# Patient Record
Sex: MALE | Race: WHITE | NOT HISPANIC OR LATINO
[De-identification: names, ages, dates, MRNs, and addresses within clinical notes are randomized per-mention and may not be internally consistent; named-entity substitution may affect disease eponyms.]

---

## 2017-01-30 ENCOUNTER — APPOINTMENT (OUTPATIENT)
Dept: RHEUMATOLOGY | Facility: CLINIC | Age: 77
End: 2017-01-30

## 2017-01-30 VITALS
TEMPERATURE: 97.8 F | BODY MASS INDEX: 24.92 KG/M2 | WEIGHT: 178 LBS | SYSTOLIC BLOOD PRESSURE: 134 MMHG | HEART RATE: 60 BPM | DIASTOLIC BLOOD PRESSURE: 80 MMHG | RESPIRATION RATE: 20 BRPM | HEIGHT: 71 IN

## 2017-01-30 DIAGNOSIS — Z55.8 OTHER PROBLEMS RELATED TO EDUCATION AND LITERACY: ICD-10-CM

## 2017-01-30 DIAGNOSIS — M54.41 LUMBAGO WITH SCIATICA, RIGHT SIDE: ICD-10-CM

## 2017-01-30 DIAGNOSIS — G89.29 LUMBAGO WITH SCIATICA, RIGHT SIDE: ICD-10-CM

## 2017-01-30 SDOH — EDUCATIONAL SECURITY - EDUCATION ATTAINMENT: OTHER PROBLEMS RELATED TO EDUCATION AND LITERACY: Z55.8

## 2017-01-31 LAB
BASOPHILS # BLD AUTO: 0.02 K/UL
BASOPHILS NFR BLD AUTO: 0.3 %
BILIRUB UR QL STRIP: NORMAL
COLLECTION METHOD: NORMAL
EOSINOPHIL # BLD AUTO: 0.08 K/UL
EOSINOPHIL NFR BLD AUTO: 1.4 %
GLUCOSE UR-MCNC: NORMAL
HCG UR QL: 0.2 EU/DL
HCT VFR BLD CALC: 41.2 %
HGB BLD-MCNC: 14.1 G/DL
HGB UR QL STRIP.AUTO: NORMAL
IMM GRANULOCYTES NFR BLD AUTO: 0.2 %
KETONES UR-MCNC: NORMAL
LEUKOCYTE ESTERASE UR QL STRIP: NORMAL
LYMPHOCYTES # BLD AUTO: 1.28 K/UL
LYMPHOCYTES NFR BLD AUTO: 22.1 %
MAN DIFF?: NORMAL
MCHC RBC-ENTMCNC: 32.2 PG
MCHC RBC-ENTMCNC: 34.2 GM/DL
MCV RBC AUTO: 94.1 FL
MONOCYTES # BLD AUTO: 0.45 K/UL
MONOCYTES NFR BLD AUTO: 7.8 %
NEUTROPHILS # BLD AUTO: 3.94 K/UL
NEUTROPHILS NFR BLD AUTO: 68.2 %
NITRITE UR QL STRIP: NORMAL
PH UR STRIP: 7
PLATELET # BLD AUTO: 210 K/UL
PROT UR STRIP-MCNC: NORMAL
RBC # BLD: 4.38 M/UL
RBC # FLD: 12.9 %
SP GR UR STRIP: 1.02
WBC # FLD AUTO: 5.78 K/UL
WESR: 7

## 2017-02-01 LAB
ALBUMIN SERPL ELPH-MCNC: 4.6 G/DL
ALP BLD-CCNC: 48 U/L
ALT SERPL-CCNC: 18 U/L
ANION GAP SERPL CALC-SCNC: 18 MMOL/L
AST SERPL-CCNC: 15 U/L
BILIRUB SERPL-MCNC: 0.3 MG/DL
BUN SERPL-MCNC: 18 MG/DL
CALCIUM SERPL-MCNC: 10.4 MG/DL
CHLORIDE SERPL-SCNC: 97 MMOL/L
CK SERPL-CCNC: 73 U/L
CO2 SERPL-SCNC: 26 MMOL/L
CREAT SERPL-MCNC: 0.93 MG/DL
CRP SERPL-MCNC: <0.2 MG/DL
GLUCOSE SERPL-MCNC: 116 MG/DL
LDH SERPL-CCNC: 145 U/L
PHOSPHATE SERPL-MCNC: 4.4 MG/DL
POTASSIUM SERPL-SCNC: 5.5 MMOL/L
PROT SERPL-MCNC: 6.9 G/DL
RHEUMATOID FACT SER QL: <7 IU/ML
SODIUM SERPL-SCNC: 141 MMOL/L

## 2017-02-02 LAB
CCP AB SER IA-ACNC: <8 UNITS
ENA SS-A AB SER IA-ACNC: <0.2 AL
ENA SS-B AB SER IA-ACNC: <0.2 AL
RF+CCP IGG SER-IMP: NEGATIVE

## 2017-03-07 ENCOUNTER — MEDICATION RENEWAL (OUTPATIENT)
Age: 77
End: 2017-03-07

## 2017-05-04 ENCOUNTER — APPOINTMENT (OUTPATIENT)
Dept: RHEUMATOLOGY | Facility: CLINIC | Age: 77
End: 2017-05-04

## 2017-05-04 VITALS
WEIGHT: 182 LBS | DIASTOLIC BLOOD PRESSURE: 78 MMHG | BODY MASS INDEX: 25.48 KG/M2 | HEART RATE: 64 BPM | RESPIRATION RATE: 18 BRPM | HEIGHT: 71 IN | TEMPERATURE: 98.3 F | SYSTOLIC BLOOD PRESSURE: 120 MMHG

## 2017-05-04 RX ORDER — MECOBAL/LEVOMEFOLAT CA/B6 PHOS 2-3-35 MG
TABLET ORAL
Refills: 0 | Status: ACTIVE | COMMUNITY

## 2017-05-04 RX ORDER — PANTOPRAZOLE 40 MG/1
40 TABLET, DELAYED RELEASE ORAL
Refills: 0 | Status: ACTIVE | COMMUNITY

## 2017-05-05 LAB
BASOPHILS # BLD AUTO: 0.02 K/UL
BASOPHILS NFR BLD AUTO: 0.3 %
BILIRUB UR QL STRIP: NORMAL
COLLECTION METHOD: NORMAL
EOSINOPHIL # BLD AUTO: 0.05 K/UL
EOSINOPHIL NFR BLD AUTO: 0.8 %
GLUCOSE UR-MCNC: NORMAL
HCG UR QL: 1 EU/DL
HCT VFR BLD CALC: 39.8 %
HGB BLD-MCNC: 13.2 G/DL
HGB UR QL STRIP.AUTO: NORMAL
IMM GRANULOCYTES NFR BLD AUTO: 0.2 %
KETONES UR-MCNC: NORMAL
LEUKOCYTE ESTERASE UR QL STRIP: NORMAL
LYMPHOCYTES # BLD AUTO: 1.25 K/UL
LYMPHOCYTES NFR BLD AUTO: 20.2 %
MAN DIFF?: NORMAL
MCHC RBC-ENTMCNC: 31.7 PG
MCHC RBC-ENTMCNC: 33.2 GM/DL
MCV RBC AUTO: 95.7 FL
MONOCYTES # BLD AUTO: 0.5 K/UL
MONOCYTES NFR BLD AUTO: 8.1 %
NEUTROPHILS # BLD AUTO: 4.36 K/UL
NEUTROPHILS NFR BLD AUTO: 70.4 %
NITRITE UR QL STRIP: NORMAL
PH UR STRIP: 6
PLATELET # BLD AUTO: 276 K/UL
PROT UR STRIP-MCNC: NORMAL
RBC # BLD: 4.16 M/UL
RBC # FLD: 13.7 %
SP GR UR STRIP: 1.02
TSH SERPL-ACNC: 3.73 UIU/ML
WBC # FLD AUTO: 6.19 K/UL
WESR: 14

## 2017-05-06 LAB
25(OH)D3 SERPL-MCNC: 63.4 NG/ML
ALBUMIN SERPL ELPH-MCNC: 4.5 G/DL
ALP BLD-CCNC: 47 U/L
ALT SERPL-CCNC: 23 U/L
ANION GAP SERPL CALC-SCNC: 17 MMOL/L
AST SERPL-CCNC: 25 U/L
BILIRUB SERPL-MCNC: 0.3 MG/DL
BUN SERPL-MCNC: 26 MG/DL
CALCIUM SERPL-MCNC: 10.1 MG/DL
CCP AB SER IA-ACNC: <8 UNITS
CHLORIDE SERPL-SCNC: 98 MMOL/L
CO2 SERPL-SCNC: 24 MMOL/L
CREAT SERPL-MCNC: 0.97 MG/DL
CRP SERPL-MCNC: <0.2 MG/DL
DEPRECATED KAPPA LC FREE/LAMBDA SER: 1.24 RATIO
ENA SS-A AB SER IA-ACNC: <0.2 AL
ENA SS-B AB SER IA-ACNC: <0.2 AL
GLUCOSE SERPL-MCNC: 123 MG/DL
IGA SER QL IEP: 142 MG/DL
IGG SER QL IEP: 868 MG/DL
IGM SER QL IEP: 25 MG/DL
KAPPA LC CSF-MCNC: 1.43 MG/DL
KAPPA LC SERPL-MCNC: 1.77 MG/DL
M PROTEIN SPEC IFE-MCNC: NORMAL
PHOSPHATE SERPL-MCNC: 3.9 MG/DL
POTASSIUM SERPL-SCNC: 5.7 MMOL/L
PROT SERPL-MCNC: 7.2 G/DL
RF+CCP IGG SER-IMP: NEGATIVE
RHEUMATOID FACT SER QL: <7 IU/ML
SODIUM SERPL-SCNC: 139 MMOL/L

## 2017-06-20 ENCOUNTER — MEDICATION RENEWAL (OUTPATIENT)
Age: 77
End: 2017-06-20

## 2017-09-07 ENCOUNTER — APPOINTMENT (OUTPATIENT)
Dept: RHEUMATOLOGY | Facility: CLINIC | Age: 77
End: 2017-09-07
Payer: MEDICARE

## 2017-09-07 VITALS
OXYGEN SATURATION: 97 % | BODY MASS INDEX: 25.9 KG/M2 | DIASTOLIC BLOOD PRESSURE: 78 MMHG | WEIGHT: 185 LBS | SYSTOLIC BLOOD PRESSURE: 128 MMHG | HEART RATE: 66 BPM | TEMPERATURE: 98 F | RESPIRATION RATE: 18 BRPM | HEIGHT: 71 IN

## 2017-09-07 DIAGNOSIS — M25.431 EFFUSION, RIGHT WRIST: ICD-10-CM

## 2017-09-07 DIAGNOSIS — M25.432 EFFUSION, RIGHT WRIST: ICD-10-CM

## 2017-09-07 LAB
BILIRUB UR QL STRIP: NORMAL
CLARITY UR: CLEAR
COLLECTION METHOD: NORMAL
GLUCOSE UR-MCNC: NORMAL
HCG UR QL: 0.2 EU/DL
HGB UR QL STRIP.AUTO: NORMAL
KETONES UR-MCNC: NORMAL
LEUKOCYTE ESTERASE UR QL STRIP: NORMAL
NITRITE UR QL STRIP: NORMAL
PH UR STRIP: 6.5
PROT UR STRIP-MCNC: NORMAL
SP GR UR STRIP: >=1.03

## 2017-09-07 PROCEDURE — 36415 COLL VENOUS BLD VENIPUNCTURE: CPT

## 2017-09-07 PROCEDURE — 81003 URINALYSIS AUTO W/O SCOPE: CPT | Mod: QW

## 2017-09-07 PROCEDURE — 99215 OFFICE O/P EST HI 40 MIN: CPT | Mod: 25

## 2017-09-09 LAB
25(OH)D3 SERPL-MCNC: 55.4 NG/ML
ALBUMIN SERPL ELPH-MCNC: 4.9 G/DL
ALP BLD-CCNC: 51 U/L
ALT SERPL-CCNC: 23 U/L
ANION GAP SERPL CALC-SCNC: 14 MMOL/L
AST SERPL-CCNC: 18 U/L
BILIRUB SERPL-MCNC: 0.3 MG/DL
BUN SERPL-MCNC: 27 MG/DL
CALCIUM SERPL-MCNC: 10.8 MG/DL
CCP AB SER IA-ACNC: <8 UNITS
CHLORIDE SERPL-SCNC: 98 MMOL/L
CO2 SERPL-SCNC: 26 MMOL/L
CREAT SERPL-MCNC: 1 MG/DL
CRP SERPL-MCNC: <0.2 MG/DL
ENA SS-A AB SER IA-ACNC: <0.2 AL
ENA SS-B AB SER IA-ACNC: <0.2 AL
GLUCOSE SERPL-MCNC: 102 MG/DL
LDH SERPL-CCNC: 180 U/L
PHOSPHATE SERPL-MCNC: 3.9 MG/DL
POTASSIUM SERPL-SCNC: 5.3 MMOL/L
PROT SERPL-MCNC: 7.6 G/DL
RF+CCP IGG SER-IMP: NEGATIVE
RHEUMATOID FACT SER QL: <7 IU/ML
SODIUM SERPL-SCNC: 138 MMOL/L
TSH SERPL-ACNC: 3.41 UIU/ML

## 2017-09-18 ENCOUNTER — CLINICAL ADVICE (OUTPATIENT)
Age: 77
End: 2017-09-18

## 2017-09-21 ENCOUNTER — MEDICATION RENEWAL (OUTPATIENT)
Age: 77
End: 2017-09-21

## 2017-12-19 ENCOUNTER — MEDICATION RENEWAL (OUTPATIENT)
Age: 77
End: 2017-12-19

## 2018-01-19 ENCOUNTER — APPOINTMENT (OUTPATIENT)
Dept: RHEUMATOLOGY | Facility: CLINIC | Age: 78
End: 2018-01-19
Payer: MEDICARE

## 2018-01-19 VITALS
HEART RATE: 69 BPM | TEMPERATURE: 98.3 F | HEIGHT: 71 IN | SYSTOLIC BLOOD PRESSURE: 150 MMHG | OXYGEN SATURATION: 94 % | RESPIRATION RATE: 17 BRPM | BODY MASS INDEX: 25.48 KG/M2 | DIASTOLIC BLOOD PRESSURE: 94 MMHG | WEIGHT: 182 LBS

## 2018-01-19 DIAGNOSIS — M21.922 UNSPECIFIED ACQUIRED DEFORMITY OF LEFT UPPER ARM: ICD-10-CM

## 2018-01-19 LAB
BILIRUB UR QL STRIP: NORMAL
COLLECTION METHOD: NORMAL
GLUCOSE UR-MCNC: NORMAL
HCG UR QL: 0.2 EU/DL
HGB UR QL STRIP.AUTO: NORMAL
KETONES UR-MCNC: NORMAL
LEUKOCYTE ESTERASE UR QL STRIP: NORMAL
NITRITE UR QL STRIP: NORMAL
PH UR STRIP: 6
PROT UR STRIP-MCNC: NORMAL
SP GR UR STRIP: 1.02
WESR: 10

## 2018-01-19 PROCEDURE — 99214 OFFICE O/P EST MOD 30 MIN: CPT | Mod: 25

## 2018-01-19 PROCEDURE — 81003 URINALYSIS AUTO W/O SCOPE: CPT | Mod: QW

## 2018-01-19 PROCEDURE — 85651 RBC SED RATE NONAUTOMATED: CPT

## 2018-01-19 PROCEDURE — 36415 COLL VENOUS BLD VENIPUNCTURE: CPT

## 2018-01-20 LAB
BASOPHILS # BLD AUTO: 0.03 K/UL
BASOPHILS NFR BLD AUTO: 0.5 %
ENA SS-A AB SER IA-ACNC: 0.3 AL
ENA SS-B AB SER IA-ACNC: <0.2 AL
EOSINOPHIL # BLD AUTO: 0.07 K/UL
EOSINOPHIL NFR BLD AUTO: 1.1 %
HCT VFR BLD CALC: 43.8 %
HGB BLD-MCNC: 14.6 G/DL
IMM GRANULOCYTES NFR BLD AUTO: 0 %
LYMPHOCYTES # BLD AUTO: 1.53 K/UL
LYMPHOCYTES NFR BLD AUTO: 23.2 %
MAN DIFF?: NORMAL
MCHC RBC-ENTMCNC: 32.3 PG
MCHC RBC-ENTMCNC: 33.3 GM/DL
MCV RBC AUTO: 96.9 FL
MONOCYTES # BLD AUTO: 0.6 K/UL
MONOCYTES NFR BLD AUTO: 9.1 %
NEUTROPHILS # BLD AUTO: 4.37 K/UL
NEUTROPHILS NFR BLD AUTO: 66.1 %
PLATELET # BLD AUTO: 255 K/UL
RBC # BLD: 4.52 M/UL
RBC # FLD: 13.8 %
WBC # FLD AUTO: 6.6 K/UL

## 2018-01-22 LAB
ALBUMIN SERPL ELPH-MCNC: 4.9 G/DL
ALP BLD-CCNC: 52 U/L
ALT SERPL-CCNC: 28 U/L
ANION GAP SERPL CALC-SCNC: 13 MMOL/L
AST SERPL-CCNC: 21 U/L
BILIRUB SERPL-MCNC: 0.4 MG/DL
BUN SERPL-MCNC: 23 MG/DL
CALCIUM SERPL-MCNC: 10.2 MG/DL
CHLORIDE SERPL-SCNC: 100 MMOL/L
CK SERPL-CCNC: 81 U/L
CO2 SERPL-SCNC: 29 MMOL/L
CREAT SERPL-MCNC: 0.94 MG/DL
CRP SERPL-MCNC: <0.2 MG/DL
GLUCOSE SERPL-MCNC: 112 MG/DL
LDH SERPL-CCNC: 188
PHOSPHATE SERPL-MCNC: 3.5 MG/DL
POTASSIUM SERPL-SCNC: 5.5 MMOL/L
PROT SERPL-MCNC: 7.8 G/DL
RHEUMATOID FACT SER QL: <7 IU/ML
SODIUM SERPL-SCNC: 142 MMOL/L

## 2018-01-23 LAB
CCP AB SER IA-ACNC: <8 UNITS
RF+CCP IGG SER-IMP: NEGATIVE

## 2018-04-13 ENCOUNTER — APPOINTMENT (OUTPATIENT)
Dept: RHEUMATOLOGY | Facility: CLINIC | Age: 78
End: 2018-04-13
Payer: MEDICARE

## 2018-04-13 VITALS
DIASTOLIC BLOOD PRESSURE: 72 MMHG | BODY MASS INDEX: 25.62 KG/M2 | HEART RATE: 68 BPM | WEIGHT: 183 LBS | RESPIRATION RATE: 16 BRPM | HEIGHT: 71 IN | SYSTOLIC BLOOD PRESSURE: 128 MMHG | OXYGEN SATURATION: 95 % | TEMPERATURE: 97.9 F

## 2018-04-13 PROCEDURE — 85651 RBC SED RATE NONAUTOMATED: CPT

## 2018-04-13 PROCEDURE — 81003 URINALYSIS AUTO W/O SCOPE: CPT | Mod: QW

## 2018-04-13 PROCEDURE — 36415 COLL VENOUS BLD VENIPUNCTURE: CPT

## 2018-04-13 PROCEDURE — 99214 OFFICE O/P EST MOD 30 MIN: CPT | Mod: 25

## 2018-04-13 RX ORDER — ATORVASTATIN CALCIUM 10 MG/1
10 TABLET, FILM COATED ORAL
Qty: 90 | Refills: 0 | Status: ACTIVE | COMMUNITY
Start: 2018-02-07

## 2018-04-13 RX ORDER — LISINOPRIL 10 MG/1
10 TABLET ORAL
Qty: 90 | Refills: 0 | Status: ACTIVE | COMMUNITY
Start: 2017-10-18

## 2018-04-13 RX ORDER — LISINOPRIL 5 MG/1
5 TABLET ORAL
Qty: 90 | Refills: 0 | Status: DISCONTINUED | COMMUNITY
Start: 2016-09-16 | End: 2018-04-13

## 2018-04-15 LAB
ALBUMIN SERPL ELPH-MCNC: 4.8 G/DL
ALP BLD-CCNC: 50 U/L
ALT SERPL-CCNC: 21 U/L
ANION GAP SERPL CALC-SCNC: 14 MMOL/L
AST SERPL-CCNC: 23 U/L
BASOPHILS # BLD AUTO: 0.02 K/UL
BASOPHILS NFR BLD AUTO: 0.3 %
BILIRUB SERPL-MCNC: 0.2 MG/DL
BILIRUB UR QL STRIP: NORMAL
BUN SERPL-MCNC: 23 MG/DL
CALCIUM SERPL-MCNC: 10.1 MG/DL
CCP AB SER IA-ACNC: <8 UNITS
CHLORIDE SERPL-SCNC: 97 MMOL/L
CO2 SERPL-SCNC: 27 MMOL/L
COLLECTION METHOD: NORMAL
CREAT SERPL-MCNC: 0.97 MG/DL
CRP SERPL-MCNC: <0.2 MG/DL
DEPRECATED KAPPA LC FREE/LAMBDA SER: 0.71 RATIO
ENA SS-A AB SER IA-ACNC: 0.4 AL
ENA SS-B AB SER IA-ACNC: <0.2 AL
EOSINOPHIL # BLD AUTO: 0.04 K/UL
EOSINOPHIL NFR BLD AUTO: 0.6 %
GLUCOSE SERPL-MCNC: 149 MG/DL
GLUCOSE UR-MCNC: NORMAL
HCG UR QL: 0.2 EU/DL
HCT VFR BLD CALC: 43.3 %
HGB BLD-MCNC: 14.3 G/DL
HGB UR QL STRIP.AUTO: NORMAL
IGA SER QL IEP: 153 MG/DL
IGG SER QL IEP: 940 MG/DL
IGM SER QL IEP: 25 MG/DL
IMM GRANULOCYTES NFR BLD AUTO: 0.2 %
KAPPA LC CSF-MCNC: 1.61 MG/DL
KAPPA LC SERPL-MCNC: 1.14 MG/DL
KETONES UR-MCNC: NORMAL
LEUKOCYTE ESTERASE UR QL STRIP: NORMAL
LYMPHOCYTES # BLD AUTO: 1.2 K/UL
LYMPHOCYTES NFR BLD AUTO: 18.9 %
M PROTEIN SPEC IFE-MCNC: NORMAL
MAN DIFF?: NORMAL
MCHC RBC-ENTMCNC: 32.2 PG
MCHC RBC-ENTMCNC: 33 GM/DL
MCV RBC AUTO: 97.5 FL
MONOCYTES # BLD AUTO: 0.5 K/UL
MONOCYTES NFR BLD AUTO: 7.9 %
NEUTROPHILS # BLD AUTO: 4.57 K/UL
NEUTROPHILS NFR BLD AUTO: 72.1 %
NITRITE UR QL STRIP: NORMAL
PH UR STRIP: 5.5
PHOSPHATE SERPL-MCNC: 3.7 MG/DL
PLATELET # BLD AUTO: 256 K/UL
POTASSIUM SERPL-SCNC: 5.3 MMOL/L
PROT SERPL-MCNC: 7.3 G/DL
PROT UR STRIP-MCNC: NORMAL
RBC # BLD: 4.44 M/UL
RBC # FLD: 13.2 %
RF+CCP IGG SER-IMP: NEGATIVE
RHEUMATOID FACT SER QL: <7 IU/ML
SODIUM SERPL-SCNC: 138 MMOL/L
SP GR UR STRIP: 1.02
WBC # FLD AUTO: 6.34 K/UL
WESR: 12

## 2018-05-16 ENCOUNTER — APPOINTMENT (OUTPATIENT)
Dept: RHEUMATOLOGY | Facility: CLINIC | Age: 78
End: 2018-05-16

## 2018-05-23 ENCOUNTER — MEDICATION RENEWAL (OUTPATIENT)
Age: 78
End: 2018-05-23

## 2018-06-26 ENCOUNTER — APPOINTMENT (OUTPATIENT)
Dept: RHEUMATOLOGY | Facility: CLINIC | Age: 78
End: 2018-06-26
Payer: MEDICARE

## 2018-06-26 PROCEDURE — 77085 DXA BONE DENSITY AXL VRT FX: CPT

## 2018-06-27 ENCOUNTER — MEDICATION RENEWAL (OUTPATIENT)
Age: 78
End: 2018-06-27

## 2018-07-17 ENCOUNTER — APPOINTMENT (OUTPATIENT)
Dept: RHEUMATOLOGY | Facility: CLINIC | Age: 78
End: 2018-07-17
Payer: MEDICARE

## 2018-07-17 VITALS
RESPIRATION RATE: 16 BRPM | BODY MASS INDEX: 25.48 KG/M2 | SYSTOLIC BLOOD PRESSURE: 134 MMHG | HEART RATE: 60 BPM | TEMPERATURE: 97.8 F | DIASTOLIC BLOOD PRESSURE: 66 MMHG | HEIGHT: 71 IN | WEIGHT: 182 LBS | OXYGEN SATURATION: 95 %

## 2018-07-17 DIAGNOSIS — R53.82 CHRONIC FATIGUE, UNSPECIFIED: ICD-10-CM

## 2018-07-17 LAB
BILIRUB UR QL STRIP: NORMAL
CLARITY UR: CLEAR
COLLECTION METHOD: NORMAL
GLUCOSE UR-MCNC: NORMAL
HCG UR QL: 0.2 EU/DL
HGB UR QL STRIP.AUTO: NORMAL
KETONES UR-MCNC: NORMAL
LEUKOCYTE ESTERASE UR QL STRIP: NORMAL
NITRITE UR QL STRIP: NORMAL
PH UR STRIP: 6.5
PROT UR STRIP-MCNC: NORMAL
SP GR UR STRIP: 1.02

## 2018-07-17 PROCEDURE — 99214 OFFICE O/P EST MOD 30 MIN: CPT | Mod: 25

## 2018-07-17 PROCEDURE — 81003 URINALYSIS AUTO W/O SCOPE: CPT | Mod: QW

## 2018-07-18 LAB
ALBUMIN SERPL ELPH-MCNC: 4.7 G/DL
ALP BLD-CCNC: 50 U/L
ALT SERPL-CCNC: 20 U/L
ANION GAP SERPL CALC-SCNC: 19 MMOL/L
AST SERPL-CCNC: 19 U/L
BASOPHILS # BLD AUTO: 0.01 K/UL
BASOPHILS NFR BLD AUTO: 0.2 %
BILIRUB SERPL-MCNC: 0.3 MG/DL
BUN SERPL-MCNC: 26 MG/DL
CALCIUM SERPL-MCNC: 9.4 MG/DL
CHLORIDE SERPL-SCNC: 97 MMOL/L
CK SERPL-CCNC: 107 U/L
CO2 SERPL-SCNC: 21 MMOL/L
CREAT SERPL-MCNC: 0.92 MG/DL
CRP SERPL-MCNC: <0.1 MG/DL
EOSINOPHIL # BLD AUTO: 0.08 K/UL
EOSINOPHIL NFR BLD AUTO: 1.5 %
ERYTHROCYTE [SEDIMENTATION RATE] IN BLOOD BY WESTERGREN METHOD: 21 MM/HR
GLUCOSE SERPL-MCNC: 219 MG/DL
HCT VFR BLD CALC: 41.5 %
HGB BLD-MCNC: 14.4 G/DL
IMM GRANULOCYTES NFR BLD AUTO: 0 %
LYMPHOCYTES # BLD AUTO: 1.27 K/UL
LYMPHOCYTES NFR BLD AUTO: 24.2 %
MAN DIFF?: NORMAL
MCHC RBC-ENTMCNC: 33.2 PG
MCHC RBC-ENTMCNC: 34.7 GM/DL
MCV RBC AUTO: 95.6 FL
MONOCYTES # BLD AUTO: 0.36 K/UL
MONOCYTES NFR BLD AUTO: 6.9 %
NEUTROPHILS # BLD AUTO: 3.52 K/UL
NEUTROPHILS NFR BLD AUTO: 67.2 %
PHOSPHATE SERPL-MCNC: 3.1 MG/DL
PLATELET # BLD AUTO: 268 K/UL
POTASSIUM SERPL-SCNC: 4.6 MMOL/L
PROT SERPL-MCNC: 7.1 G/DL
RBC # BLD: 4.34 M/UL
RBC # FLD: 13.3 %
RHEUMATOID FACT SER QL: <10 IU/ML
SODIUM SERPL-SCNC: 137 MMOL/L
WBC # FLD AUTO: 5.24 K/UL

## 2018-07-20 ENCOUNTER — MEDICATION RENEWAL (OUTPATIENT)
Age: 78
End: 2018-07-20

## 2018-07-20 LAB
CCP AB SER IA-ACNC: <8 UNITS
ENA SS-A AB SER IA-ACNC: 0.7 AL
ENA SS-B AB SER IA-ACNC: <0.2 AL
RF+CCP IGG SER-IMP: NEGATIVE

## 2018-07-23 ENCOUNTER — CLINICAL ADVICE (OUTPATIENT)
Age: 78
End: 2018-07-23

## 2018-08-11 ENCOUNTER — MEDICATION RENEWAL (OUTPATIENT)
Age: 78
End: 2018-08-11

## 2018-08-14 ENCOUNTER — MEDICATION RENEWAL (OUTPATIENT)
Age: 78
End: 2018-08-14

## 2018-09-08 ENCOUNTER — MEDICATION RENEWAL (OUTPATIENT)
Age: 78
End: 2018-09-08

## 2018-10-19 ENCOUNTER — MEDICATION RENEWAL (OUTPATIENT)
Age: 78
End: 2018-10-19

## 2018-10-31 ENCOUNTER — APPOINTMENT (OUTPATIENT)
Dept: RHEUMATOLOGY | Facility: CLINIC | Age: 78
End: 2018-10-31
Payer: MEDICARE

## 2018-10-31 VITALS
SYSTOLIC BLOOD PRESSURE: 116 MMHG | DIASTOLIC BLOOD PRESSURE: 82 MMHG | HEIGHT: 71 IN | TEMPERATURE: 97.9 F | RESPIRATION RATE: 18 BRPM | HEART RATE: 63 BPM | WEIGHT: 185 LBS | BODY MASS INDEX: 25.9 KG/M2 | OXYGEN SATURATION: 93 %

## 2018-10-31 DIAGNOSIS — G89.29 PAIN IN UNSPECIFIED HAND: ICD-10-CM

## 2018-10-31 DIAGNOSIS — M79.643 PAIN IN UNSPECIFIED HAND: ICD-10-CM

## 2018-10-31 PROCEDURE — 81003 URINALYSIS AUTO W/O SCOPE: CPT | Mod: QW

## 2018-10-31 PROCEDURE — 99215 OFFICE O/P EST HI 40 MIN: CPT | Mod: 25

## 2018-10-31 PROCEDURE — 36415 COLL VENOUS BLD VENIPUNCTURE: CPT

## 2018-10-31 RX ORDER — RISEDRONATE SODIUM 150 MG/1
150 TABLET, FILM COATED ORAL
Qty: 3 | Refills: 0 | Status: DISCONTINUED | COMMUNITY
Start: 2018-07-18 | End: 2018-10-31

## 2018-11-02 LAB
ALBUMIN SERPL ELPH-MCNC: 5 G/DL
ALP BLD-CCNC: 50 U/L
ALT SERPL-CCNC: 17 U/L
ANION GAP SERPL CALC-SCNC: 16 MMOL/L
AST SERPL-CCNC: 23 U/L
BASOPHILS # BLD AUTO: 0.02 K/UL
BASOPHILS NFR BLD AUTO: 0.3 %
BILIRUB SERPL-MCNC: 0.5 MG/DL
BILIRUB UR QL STRIP: NORMAL
BUN SERPL-MCNC: 16 MG/DL
CALCIUM SERPL-MCNC: 10 MG/DL
CCP AB SER IA-ACNC: <8 UNITS
CHLORIDE SERPL-SCNC: 96 MMOL/L
CK SERPL-CCNC: 150 U/L
CLARITY UR: CLEAR
CO2 SERPL-SCNC: 26 MMOL/L
COLLECTION METHOD: NORMAL
CREAT SERPL-MCNC: 0.83 MG/DL
CRP SERPL-MCNC: <0.1 MG/DL
ENA SS-A AB SER IA-ACNC: 0.9 AL
ENA SS-B AB SER IA-ACNC: <0.2 AL
EOSINOPHIL # BLD AUTO: 0.06 K/UL
EOSINOPHIL NFR BLD AUTO: 1 %
ERYTHROCYTE [SEDIMENTATION RATE] IN BLOOD BY WESTERGREN METHOD: 11 MM/HR
GLUCOSE SERPL-MCNC: 114 MG/DL
GLUCOSE UR-MCNC: NORMAL
HCG UR QL: 0.2 EU/DL
HCT VFR BLD CALC: 42.2 %
HGB BLD-MCNC: 14 G/DL
HGB UR QL STRIP.AUTO: NORMAL
IMM GRANULOCYTES NFR BLD AUTO: 0.2 %
KETONES UR-MCNC: NORMAL
LEUKOCYTE ESTERASE UR QL STRIP: NORMAL
LYMPHOCYTES # BLD AUTO: 1.08 K/UL
LYMPHOCYTES NFR BLD AUTO: 17.7 %
MAN DIFF?: NORMAL
MCHC RBC-ENTMCNC: 31.7 PG
MCHC RBC-ENTMCNC: 33.2 GM/DL
MCV RBC AUTO: 95.5 FL
MONOCYTES # BLD AUTO: 0.41 K/UL
MONOCYTES NFR BLD AUTO: 6.7 %
NEUTROPHILS # BLD AUTO: 4.52 K/UL
NEUTROPHILS NFR BLD AUTO: 74.1 %
NITRITE UR QL STRIP: NORMAL
PH UR STRIP: 7
PHOSPHATE SERPL-MCNC: 3.2 MG/DL
PLATELET # BLD AUTO: 266 K/UL
POTASSIUM SERPL-SCNC: 5.3 MMOL/L
PROT SERPL-MCNC: 7.5 G/DL
PROT UR STRIP-MCNC: NORMAL
RBC # BLD: 4.42 M/UL
RBC # FLD: 13.7 %
RF+CCP IGG SER-IMP: NEGATIVE
RHEUMATOID FACT SER QL: <10 IU/ML
SODIUM SERPL-SCNC: 138 MMOL/L
SP GR UR STRIP: 1.01
TSH SERPL-ACNC: 3.07 UIU/ML
WBC # FLD AUTO: 6.1 K/UL

## 2018-11-03 ENCOUNTER — RX RENEWAL (OUTPATIENT)
Age: 78
End: 2018-11-03

## 2019-01-03 ENCOUNTER — MEDICATION RENEWAL (OUTPATIENT)
Age: 79
End: 2019-01-03

## 2019-01-26 ENCOUNTER — MEDICATION RENEWAL (OUTPATIENT)
Age: 79
End: 2019-01-26

## 2019-02-13 ENCOUNTER — MEDICATION RENEWAL (OUTPATIENT)
Age: 79
End: 2019-02-13

## 2019-02-25 ENCOUNTER — APPOINTMENT (OUTPATIENT)
Dept: RHEUMATOLOGY | Facility: CLINIC | Age: 79
End: 2019-02-25
Payer: MEDICARE

## 2019-02-25 VITALS
SYSTOLIC BLOOD PRESSURE: 146 MMHG | RESPIRATION RATE: 17 BRPM | BODY MASS INDEX: 25.62 KG/M2 | OXYGEN SATURATION: 96 % | HEIGHT: 71 IN | DIASTOLIC BLOOD PRESSURE: 60 MMHG | HEART RATE: 69 BPM | WEIGHT: 183 LBS | TEMPERATURE: 97.6 F

## 2019-02-25 DIAGNOSIS — G89.29 PAIN IN UNSPECIFIED ELBOW: ICD-10-CM

## 2019-02-25 DIAGNOSIS — M25.529 PAIN IN UNSPECIFIED ELBOW: ICD-10-CM

## 2019-02-25 PROCEDURE — 81003 URINALYSIS AUTO W/O SCOPE: CPT | Mod: QW

## 2019-02-25 PROCEDURE — 36415 COLL VENOUS BLD VENIPUNCTURE: CPT

## 2019-02-25 PROCEDURE — 99214 OFFICE O/P EST MOD 30 MIN: CPT | Mod: 25

## 2019-02-27 LAB
ALBUMIN SERPL ELPH-MCNC: 5.1 G/DL
ALP BLD-CCNC: 45 U/L
ALT SERPL-CCNC: 24 U/L
ANION GAP SERPL CALC-SCNC: 14 MMOL/L
AST SERPL-CCNC: 19 U/L
BASOPHILS # BLD AUTO: 0.04 K/UL
BASOPHILS NFR BLD AUTO: 0.7 %
BILIRUB SERPL-MCNC: 0.4 MG/DL
BILIRUB UR QL STRIP: NORMAL
BUN SERPL-MCNC: 21 MG/DL
CALCIUM SERPL-MCNC: 10 MG/DL
CCP AB SER IA-ACNC: 10 UNITS
CHLORIDE SERPL-SCNC: 97 MMOL/L
CK SERPL-CCNC: 69 U/L
CLARITY UR: CLEAR
CO2 SERPL-SCNC: 27 MMOL/L
COLLECTION METHOD: NORMAL
CREAT SERPL-MCNC: 0.8 MG/DL
CRP SERPL-MCNC: <0.1 MG/DL
ENA SS-A AB SER IA-ACNC: 1.1 AL
ENA SS-B AB SER IA-ACNC: <0.2 AL
EOSINOPHIL # BLD AUTO: 0.03 K/UL
EOSINOPHIL NFR BLD AUTO: 0.5 %
ERYTHROCYTE [SEDIMENTATION RATE] IN BLOOD BY WESTERGREN METHOD: 11 MM/HR
GLUCOSE SERPL-MCNC: 118 MG/DL
GLUCOSE UR-MCNC: NORMAL
HCG UR QL: 0.2 EU/DL
HCT VFR BLD CALC: 42.8 %
HGB BLD-MCNC: 14.2 G/DL
HGB UR QL STRIP.AUTO: NORMAL
IMM GRANULOCYTES NFR BLD AUTO: 0.2 %
KETONES UR-MCNC: NORMAL
LDH SERPL-CCNC: 157 U/L
LEUKOCYTE ESTERASE UR QL STRIP: NORMAL
LYMPHOCYTES # BLD AUTO: 1.12 K/UL
LYMPHOCYTES NFR BLD AUTO: 19.4 %
MAN DIFF?: NORMAL
MCHC RBC-ENTMCNC: 32.3 PG
MCHC RBC-ENTMCNC: 33.2 GM/DL
MCV RBC AUTO: 97.3 FL
MONOCYTES # BLD AUTO: 0.44 K/UL
MONOCYTES NFR BLD AUTO: 7.6 %
NEUTROPHILS # BLD AUTO: 4.13 K/UL
NEUTROPHILS NFR BLD AUTO: 71.6 %
NITRITE UR QL STRIP: NORMAL
PH UR STRIP: 6
PHOSPHATE SERPL-MCNC: 3.8 MG/DL
PLATELET # BLD AUTO: 257 K/UL
POTASSIUM SERPL-SCNC: 5 MMOL/L
PROT SERPL-MCNC: 7.8 G/DL
PROT UR STRIP-MCNC: NORMAL
RBC # BLD: 4.4 M/UL
RBC # FLD: 13.2 %
RF+CCP IGG SER-IMP: NEGATIVE
RHEUMATOID FACT SER QL: <10 IU/ML
SODIUM SERPL-SCNC: 138 MMOL/L
SP GR UR STRIP: 1.02
WBC # FLD AUTO: 5.77 K/UL

## 2019-03-04 ENCOUNTER — FORM ENCOUNTER (OUTPATIENT)
Age: 79
End: 2019-03-04

## 2019-03-05 ENCOUNTER — OUTPATIENT (OUTPATIENT)
Dept: OUTPATIENT SERVICES | Facility: HOSPITAL | Age: 79
LOS: 1 days | End: 2019-03-05
Payer: MEDICARE

## 2019-03-05 ENCOUNTER — APPOINTMENT (OUTPATIENT)
Dept: RADIOLOGY | Facility: CLINIC | Age: 79
End: 2019-03-05
Payer: MEDICARE

## 2019-03-05 DIAGNOSIS — Z95.1 PRESENCE OF AORTOCORONARY BYPASS GRAFT: Chronic | ICD-10-CM

## 2019-03-05 DIAGNOSIS — R06.9 UNSPECIFIED ABNORMALITIES OF BREATHING: ICD-10-CM

## 2019-03-05 PROCEDURE — 73080 X-RAY EXAM OF ELBOW: CPT | Mod: 26,50

## 2019-03-05 PROCEDURE — 73110 X-RAY EXAM OF WRIST: CPT | Mod: 26,50

## 2019-03-05 PROCEDURE — 71046 X-RAY EXAM CHEST 2 VIEWS: CPT | Mod: 26

## 2019-03-05 PROCEDURE — 73630 X-RAY EXAM OF FOOT: CPT | Mod: 26,50

## 2019-03-05 PROCEDURE — 71046 X-RAY EXAM CHEST 2 VIEWS: CPT

## 2019-03-05 PROCEDURE — 72050 X-RAY EXAM NECK SPINE 4/5VWS: CPT

## 2019-03-05 PROCEDURE — 73130 X-RAY EXAM OF HAND: CPT | Mod: 26,50

## 2019-03-05 PROCEDURE — 72050 X-RAY EXAM NECK SPINE 4/5VWS: CPT | Mod: 26

## 2019-03-05 PROCEDURE — 73080 X-RAY EXAM OF ELBOW: CPT

## 2019-03-05 PROCEDURE — 73630 X-RAY EXAM OF FOOT: CPT

## 2019-03-05 PROCEDURE — 73110 X-RAY EXAM OF WRIST: CPT

## 2019-03-05 PROCEDURE — 73130 X-RAY EXAM OF HAND: CPT

## 2019-03-31 ENCOUNTER — RX RENEWAL (OUTPATIENT)
Age: 79
End: 2019-03-31

## 2019-04-18 ENCOUNTER — MEDICATION RENEWAL (OUTPATIENT)
Age: 79
End: 2019-04-18

## 2019-05-03 ENCOUNTER — MEDICATION RENEWAL (OUTPATIENT)
Age: 79
End: 2019-05-03

## 2019-06-20 ENCOUNTER — APPOINTMENT (OUTPATIENT)
Dept: RHEUMATOLOGY | Facility: CLINIC | Age: 79
End: 2019-06-20
Payer: MEDICARE

## 2019-06-20 VITALS — RESPIRATION RATE: 18 BRPM | DIASTOLIC BLOOD PRESSURE: 80 MMHG | HEART RATE: 84 BPM | SYSTOLIC BLOOD PRESSURE: 130 MMHG

## 2019-06-20 PROCEDURE — 99215 OFFICE O/P EST HI 40 MIN: CPT

## 2019-06-26 ENCOUNTER — LABORATORY RESULT (OUTPATIENT)
Age: 79
End: 2019-06-26

## 2019-07-06 LAB
ALBUMIN SERPL ELPH-MCNC: 4.5 G/DL
ALP BLD-CCNC: 52 U/L
ALT SERPL-CCNC: 19 U/L
ANION GAP SERPL CALC-SCNC: 14 MMOL/L
APPEARANCE: CLEAR
AST SERPL-CCNC: 17 U/L
BASOPHILS # BLD AUTO: 0.05 K/UL
BASOPHILS NFR BLD AUTO: 0.9 %
BILIRUB SERPL-MCNC: 0.4 MG/DL
BILIRUBIN URINE: NEGATIVE
BLOOD URINE: NEGATIVE
BUN SERPL-MCNC: 28 MG/DL
CALCIUM SERPL-MCNC: 9.7 MG/DL
CCP AB SER IA-ACNC: 14 UNITS
CHLORIDE SERPL-SCNC: 103 MMOL/L
CO2 SERPL-SCNC: 23 MMOL/L
COLOR: YELLOW
CREAT SERPL-MCNC: 0.96 MG/DL
CRP SERPL-MCNC: 0.11 MG/DL
ENA SS-A AB SER IA-ACNC: 1.6 AL
ENA SS-B AB SER IA-ACNC: <0.2 AL
EOSINOPHIL # BLD AUTO: 0.06 K/UL
EOSINOPHIL NFR BLD AUTO: 1.1 %
ERYTHROCYTE [SEDIMENTATION RATE] IN BLOOD BY WESTERGREN METHOD: 17 MM/HR
GLUCOSE QUALITATIVE U: NEGATIVE
GLUCOSE SERPL-MCNC: 143 MG/DL
HCT VFR BLD CALC: 41.1 %
HGB BLD-MCNC: 13.7 G/DL
IMM GRANULOCYTES NFR BLD AUTO: 0.2 %
KETONES URINE: NEGATIVE
LDH SERPL-CCNC: 173 U/L
LEUKOCYTE ESTERASE URINE: NEGATIVE
LYMPHOCYTES # BLD AUTO: 0.98 K/UL
LYMPHOCYTES NFR BLD AUTO: 17.3 %
MAN DIFF?: NORMAL
MCHC RBC-ENTMCNC: 32.2 PG
MCHC RBC-ENTMCNC: 33.3 GM/DL
MCV RBC AUTO: 96.5 FL
MONOCYTES # BLD AUTO: 0.57 K/UL
MONOCYTES NFR BLD AUTO: 10 %
NEUTROPHILS # BLD AUTO: 4.01 K/UL
NEUTROPHILS NFR BLD AUTO: 70.5 %
NITRITE URINE: NEGATIVE
PH URINE: 6.5
PHOSPHATE SERPL-MCNC: 3.4 MG/DL
PLATELET # BLD AUTO: 282 K/UL
POTASSIUM SERPL-SCNC: 4.4 MMOL/L
PROT SERPL-MCNC: 6.9 G/DL
PROTEIN URINE: ABNORMAL
RBC # BLD: 4.26 M/UL
RBC # FLD: 13.2 %
RF+CCP IGG SER-IMP: NEGATIVE
RHEUMATOID FACT SER QL: <10 IU/ML
SODIUM SERPL-SCNC: 140 MMOL/L
SPECIFIC GRAVITY URINE: 1.03
UROBILINOGEN URINE: NORMAL
WBC # FLD AUTO: 5.68 K/UL

## 2019-09-16 ENCOUNTER — RX RENEWAL (OUTPATIENT)
Age: 79
End: 2019-09-16

## 2019-09-23 ENCOUNTER — APPOINTMENT (OUTPATIENT)
Dept: RHEUMATOLOGY | Facility: CLINIC | Age: 79
End: 2019-09-23
Payer: MEDICARE

## 2019-09-23 VITALS
TEMPERATURE: 97.5 F | SYSTOLIC BLOOD PRESSURE: 128 MMHG | HEART RATE: 59 BPM | DIASTOLIC BLOOD PRESSURE: 80 MMHG | RESPIRATION RATE: 18 BRPM | OXYGEN SATURATION: 96 %

## 2019-09-23 PROCEDURE — 99214 OFFICE O/P EST MOD 30 MIN: CPT

## 2019-09-30 LAB
ALBUMIN SERPL ELPH-MCNC: 4.8 G/DL
ALP BLD-CCNC: 47 U/L
ALT SERPL-CCNC: 15 U/L
ANION GAP SERPL CALC-SCNC: 13 MMOL/L
AST SERPL-CCNC: 15 U/L
BASOPHILS # BLD AUTO: 0.04 K/UL
BASOPHILS NFR BLD AUTO: 0.6 %
BILIRUB SERPL-MCNC: 0.5 MG/DL
BILIRUB UR QL STRIP: NORMAL
BUN SERPL-MCNC: 21 MG/DL
CALCIUM SERPL-MCNC: 10 MG/DL
CCP AB SER IA-ACNC: 9 UNITS
CHLORIDE SERPL-SCNC: 97 MMOL/L
CK SERPL-CCNC: 59 U/L
CLARITY UR: CLEAR
CO2 SERPL-SCNC: 27 MMOL/L
COLLECTION METHOD: NORMAL
CREAT SERPL-MCNC: 0.86 MG/DL
CRP SERPL-MCNC: <0.1 MG/DL
DEPRECATED KAPPA LC FREE/LAMBDA SER: 1.22 RATIO
ENA SS-A AB SER IA-ACNC: 1.6 AL
ENA SS-B AB SER IA-ACNC: <0.2 AL
EOSINOPHIL # BLD AUTO: 0.04 K/UL
EOSINOPHIL NFR BLD AUTO: 0.6 %
ERYTHROCYTE [SEDIMENTATION RATE] IN BLOOD BY WESTERGREN METHOD: 7 MM/HR
GLUCOSE SERPL-MCNC: 92 MG/DL
GLUCOSE UR-MCNC: NORMAL
HCG UR QL: 1 EU/DL
HCT VFR BLD CALC: 43.3 %
HGB BLD-MCNC: 14 G/DL
HGB UR QL STRIP.AUTO: NORMAL
IGA SER QL IEP: 167 MG/DL
IGG SER QL IEP: 964 MG/DL
IGM SER QL IEP: 32 MG/DL
IMM GRANULOCYTES NFR BLD AUTO: 0.3 %
KAPPA LC CSF-MCNC: 1.12 MG/DL
KAPPA LC SERPL-MCNC: 1.37 MG/DL
KETONES UR-MCNC: NORMAL
LDH SERPL-CCNC: 152 U/L
LEUKOCYTE ESTERASE UR QL STRIP: NORMAL
LYMPHOCYTES # BLD AUTO: 1.4 K/UL
LYMPHOCYTES NFR BLD AUTO: 20.4 %
M PROTEIN SPEC IFE-MCNC: NORMAL
MAN DIFF?: NORMAL
MCHC RBC-ENTMCNC: 31.7 PG
MCHC RBC-ENTMCNC: 32.3 GM/DL
MCV RBC AUTO: 98 FL
MONOCYTES # BLD AUTO: 0.51 K/UL
MONOCYTES NFR BLD AUTO: 7.4 %
NEUTROPHILS # BLD AUTO: 4.85 K/UL
NEUTROPHILS NFR BLD AUTO: 70.7 %
NITRITE UR QL STRIP: NORMAL
PH UR STRIP: 7
PHOSPHATE SERPL-MCNC: 3 MG/DL
PLATELET # BLD AUTO: 266 K/UL
POTASSIUM SERPL-SCNC: 5.1 MMOL/L
PROT SERPL-MCNC: 7.3 G/DL
PROT UR STRIP-MCNC: NORMAL
RBC # BLD: 4.42 M/UL
RBC # FLD: 12.7 %
RF+CCP IGG SER-IMP: NEGATIVE
RHEUMATOID FACT SER QL: <10 IU/ML
SODIUM SERPL-SCNC: 137 MMOL/L
SP GR UR STRIP: 1.02
TSH SERPL-ACNC: 5.06 UIU/ML
WBC # FLD AUTO: 6.86 K/UL

## 2019-11-29 RX ORDER — DULOXETINE HYDROCHLORIDE 20 MG/1
20 CAPSULE, DELAYED RELEASE PELLETS ORAL
Qty: 90 | Refills: 0 | Status: DISCONTINUED | COMMUNITY
Start: 2019-09-23 | End: 2019-11-29

## 2019-12-19 ENCOUNTER — APPOINTMENT (OUTPATIENT)
Dept: RHEUMATOLOGY | Facility: CLINIC | Age: 79
End: 2019-12-19

## 2020-01-13 ENCOUNTER — APPOINTMENT (OUTPATIENT)
Dept: RHEUMATOLOGY | Facility: CLINIC | Age: 80
End: 2020-01-13
Payer: MEDICARE

## 2020-01-13 VITALS
TEMPERATURE: 98.2 F | HEART RATE: 65 BPM | HEIGHT: 71 IN | SYSTOLIC BLOOD PRESSURE: 130 MMHG | DIASTOLIC BLOOD PRESSURE: 80 MMHG | OXYGEN SATURATION: 95 % | BODY MASS INDEX: 25.48 KG/M2 | WEIGHT: 182 LBS

## 2020-01-13 PROCEDURE — 99215 OFFICE O/P EST HI 40 MIN: CPT

## 2020-01-19 ENCOUNTER — FORM ENCOUNTER (OUTPATIENT)
Age: 80
End: 2020-01-19

## 2020-01-20 ENCOUNTER — LABORATORY RESULT (OUTPATIENT)
Age: 80
End: 2020-01-20

## 2020-01-20 ENCOUNTER — APPOINTMENT (OUTPATIENT)
Dept: RADIOLOGY | Facility: CLINIC | Age: 80
End: 2020-01-20
Payer: MEDICARE

## 2020-01-20 ENCOUNTER — OUTPATIENT (OUTPATIENT)
Dept: OUTPATIENT SERVICES | Facility: HOSPITAL | Age: 80
LOS: 1 days | End: 2020-01-20
Payer: MEDICARE

## 2020-01-20 DIAGNOSIS — Z95.1 PRESENCE OF AORTOCORONARY BYPASS GRAFT: Chronic | ICD-10-CM

## 2020-01-20 DIAGNOSIS — Z00.8 ENCOUNTER FOR OTHER GENERAL EXAMINATION: ICD-10-CM

## 2020-01-20 PROCEDURE — 72050 X-RAY EXAM NECK SPINE 4/5VWS: CPT

## 2020-01-20 PROCEDURE — 71046 X-RAY EXAM CHEST 2 VIEWS: CPT | Mod: 26

## 2020-01-20 PROCEDURE — 72050 X-RAY EXAM NECK SPINE 4/5VWS: CPT | Mod: 26

## 2020-01-20 PROCEDURE — 73630 X-RAY EXAM OF FOOT: CPT | Mod: 26,50

## 2020-01-20 PROCEDURE — 73130 X-RAY EXAM OF HAND: CPT | Mod: 26,50

## 2020-01-20 PROCEDURE — 73110 X-RAY EXAM OF WRIST: CPT

## 2020-01-20 PROCEDURE — 71046 X-RAY EXAM CHEST 2 VIEWS: CPT

## 2020-01-20 PROCEDURE — 73630 X-RAY EXAM OF FOOT: CPT

## 2020-01-20 PROCEDURE — 72200 X-RAY EXAM SI JOINTS: CPT

## 2020-01-20 PROCEDURE — 72200 X-RAY EXAM SI JOINTS: CPT | Mod: 26

## 2020-01-20 PROCEDURE — 73130 X-RAY EXAM OF HAND: CPT

## 2020-01-20 PROCEDURE — 72110 X-RAY EXAM L-2 SPINE 4/>VWS: CPT | Mod: 26

## 2020-01-20 PROCEDURE — 72110 X-RAY EXAM L-2 SPINE 4/>VWS: CPT

## 2020-01-20 PROCEDURE — 73110 X-RAY EXAM OF WRIST: CPT | Mod: 26,50

## 2020-01-21 LAB
25(OH)D3 SERPL-MCNC: 66.2 NG/ML
ALBUMIN SERPL ELPH-MCNC: 4.8 G/DL
ALP BLD-CCNC: 55 U/L
ALT SERPL-CCNC: 19 U/L
ANION GAP SERPL CALC-SCNC: 23 MMOL/L
APPEARANCE: CLEAR
AST SERPL-CCNC: 25 U/L
BASOPHILS # BLD AUTO: 0.03 K/UL
BASOPHILS NFR BLD AUTO: 0.5 %
BILIRUB SERPL-MCNC: 0.4 MG/DL
BILIRUBIN URINE: NEGATIVE
BLOOD URINE: NEGATIVE
BUN SERPL-MCNC: 22 MG/DL
CALCIUM SERPL-MCNC: 9.6 MG/DL
CHLORIDE SERPL-SCNC: 97 MMOL/L
CK SERPL-CCNC: 156 U/L
CO2 SERPL-SCNC: 18 MMOL/L
COLOR: YELLOW
CREAT SERPL-MCNC: 0.84 MG/DL
CRP SERPL-MCNC: <0.1 MG/DL
EOSINOPHIL # BLD AUTO: 0.04 K/UL
EOSINOPHIL NFR BLD AUTO: 0.6 %
ERYTHROCYTE [SEDIMENTATION RATE] IN BLOOD BY WESTERGREN METHOD: <2 MM/HR
GLUCOSE QUALITATIVE U: NEGATIVE
GLUCOSE SERPL-MCNC: 112 MG/DL
HCT VFR BLD CALC: 42.7 %
HGB BLD-MCNC: 13.8 G/DL
IMM GRANULOCYTES NFR BLD AUTO: 0.2 %
KETONES URINE: NEGATIVE
LDH SERPL-CCNC: 212 U/L
LEUKOCYTE ESTERASE URINE: NEGATIVE
LYMPHOCYTES # BLD AUTO: 1.32 K/UL
LYMPHOCYTES NFR BLD AUTO: 20.6 %
MAGNESIUM SERPL-MCNC: 1.8 MG/DL
MAN DIFF?: NORMAL
MCHC RBC-ENTMCNC: 31.7 PG
MCHC RBC-ENTMCNC: 32.3 GM/DL
MCV RBC AUTO: 98.2 FL
MONOCYTES # BLD AUTO: 0.46 K/UL
MONOCYTES NFR BLD AUTO: 7.2 %
NEUTROPHILS # BLD AUTO: 4.55 K/UL
NEUTROPHILS NFR BLD AUTO: 70.9 %
NITRITE URINE: NEGATIVE
PH URINE: 7
PHOSPHATE SERPL-MCNC: 3.3 MG/DL
PLATELET # BLD AUTO: 260 K/UL
POTASSIUM SERPL-SCNC: 4.8 MMOL/L
PROT SERPL-MCNC: 7.3 G/DL
PROTEIN URINE: NEGATIVE
RBC # BLD: 4.35 M/UL
RBC # FLD: 12.9 %
RHEUMATOID FACT SER QL: <10 IU/ML
SODIUM SERPL-SCNC: 139 MMOL/L
SPECIFIC GRAVITY URINE: 1.02
T3 SERPL-MCNC: 97 NG/DL
T3RU NFR SERPL: 1.1 TBI
T4 SERPL-MCNC: 7.7 UG/DL
TSH SERPL-ACNC: 3.32 UIU/ML
UROBILINOGEN URINE: NORMAL
WBC # FLD AUTO: 6.41 K/UL

## 2020-01-26 LAB
CCP AB SER IA-ACNC: 17 UNITS
ENA SS-A AB SER IA-ACNC: 2.7 AL
ENA SS-B AB SER IA-ACNC: <0.2 AL
RF+CCP IGG SER-IMP: NEGATIVE

## 2020-02-01 ENCOUNTER — RX RENEWAL (OUTPATIENT)
Age: 80
End: 2020-02-01

## 2020-04-11 ENCOUNTER — RX RENEWAL (OUTPATIENT)
Age: 80
End: 2020-04-11

## 2020-04-13 ENCOUNTER — RX RENEWAL (OUTPATIENT)
Age: 80
End: 2020-04-13

## 2020-04-15 ENCOUNTER — RX RENEWAL (OUTPATIENT)
Age: 80
End: 2020-04-15

## 2020-04-19 LAB
ALBUMIN SERPL ELPH-MCNC: 5 G/DL
ALP BLD-CCNC: 55 U/L
ALT SERPL-CCNC: 18 U/L
ANION GAP SERPL CALC-SCNC: 16 MMOL/L
AST SERPL-CCNC: 19 U/L
BASOPHILS # BLD AUTO: 0.04 K/UL
BASOPHILS NFR BLD AUTO: 0.6 %
BILIRUB SERPL-MCNC: 0.4 MG/DL
BUN SERPL-MCNC: 28 MG/DL
CALCIUM SERPL-MCNC: 10.7 MG/DL
CHLORIDE SERPL-SCNC: 98 MMOL/L
CO2 SERPL-SCNC: 26 MMOL/L
CREAT SERPL-MCNC: 0.98 MG/DL
CRP SERPL-MCNC: <0.1 MG/DL
EOSINOPHIL # BLD AUTO: 0.08 K/UL
EOSINOPHIL NFR BLD AUTO: 1.2 %
ERYTHROCYTE [SEDIMENTATION RATE] IN BLOOD BY WESTERGREN METHOD: 30 MM/HR
GLUCOSE SERPL-MCNC: 118 MG/DL
HCT VFR BLD CALC: 45.4 %
HGB BLD-MCNC: 14.6 G/DL
IMM GRANULOCYTES NFR BLD AUTO: 0.3 %
LYMPHOCYTES # BLD AUTO: 1.26 K/UL
LYMPHOCYTES NFR BLD AUTO: 18.5 %
MAN DIFF?: NORMAL
MCHC RBC-ENTMCNC: 31.2 PG
MCHC RBC-ENTMCNC: 32.2 GM/DL
MCV RBC AUTO: 97 FL
MONOCYTES # BLD AUTO: 0.57 K/UL
MONOCYTES NFR BLD AUTO: 8.4 %
NEUTROPHILS # BLD AUTO: 4.84 K/UL
NEUTROPHILS NFR BLD AUTO: 71 %
PHOSPHATE SERPL-MCNC: 4 MG/DL
PLATELET # BLD AUTO: 274 K/UL
POTASSIUM SERPL-SCNC: 5.7 MMOL/L
PROT SERPL-MCNC: 7.3 G/DL
RBC # BLD: 4.68 M/UL
RBC # FLD: 13.4 %
SODIUM SERPL-SCNC: 139 MMOL/L
WBC # FLD AUTO: 6.81 K/UL

## 2020-04-27 LAB
ANION GAP SERPL CALC-SCNC: 14 MMOL/L
BUN SERPL-MCNC: 17 MG/DL
CALCIUM SERPL-MCNC: 10.1 MG/DL
CHLORIDE SERPL-SCNC: 95 MMOL/L
CO2 SERPL-SCNC: 29 MMOL/L
CREAT SERPL-MCNC: 0.88 MG/DL
GLUCOSE SERPL-MCNC: 135 MG/DL
POTASSIUM SERPL-SCNC: 5 MMOL/L
SODIUM SERPL-SCNC: 138 MMOL/L

## 2020-05-29 ENCOUNTER — RX RENEWAL (OUTPATIENT)
Age: 80
End: 2020-05-29

## 2020-07-21 ENCOUNTER — APPOINTMENT (OUTPATIENT)
Dept: RHEUMATOLOGY | Facility: CLINIC | Age: 80
End: 2020-07-21
Payer: MEDICARE

## 2020-07-21 VITALS
RESPIRATION RATE: 18 BRPM | HEIGHT: 71 IN | BODY MASS INDEX: 25.48 KG/M2 | OXYGEN SATURATION: 96 % | DIASTOLIC BLOOD PRESSURE: 80 MMHG | WEIGHT: 182 LBS | SYSTOLIC BLOOD PRESSURE: 124 MMHG | HEART RATE: 88 BPM

## 2020-07-21 VITALS — TEMPERATURE: 98.9 F

## 2020-07-21 DIAGNOSIS — M06.9 RHEUMATOID ARTHRITIS, UNSPECIFIED: ICD-10-CM

## 2020-07-21 DIAGNOSIS — M85.80 OTHER SPECIFIED DISORDERS OF BONE DENSITY AND STRUCTURE, UNSPECIFIED SITE: ICD-10-CM

## 2020-07-21 PROCEDURE — 99215 OFFICE O/P EST HI 40 MIN: CPT

## 2020-07-21 RX ORDER — GABAPENTIN 600 MG/1
600 TABLET, COATED ORAL 3 TIMES DAILY
Qty: 270 | Refills: 0 | Status: DISCONTINUED | COMMUNITY
Start: 2020-01-18 | End: 2020-07-21

## 2020-09-04 ENCOUNTER — RX RENEWAL (OUTPATIENT)
Age: 80
End: 2020-09-04

## 2020-10-05 ENCOUNTER — RX RENEWAL (OUTPATIENT)
Age: 80
End: 2020-10-05

## 2020-10-09 ENCOUNTER — APPOINTMENT (OUTPATIENT)
Dept: RADIOLOGY | Facility: CLINIC | Age: 80
End: 2020-10-09
Payer: MEDICARE

## 2020-10-09 ENCOUNTER — OUTPATIENT (OUTPATIENT)
Dept: OUTPATIENT SERVICES | Facility: HOSPITAL | Age: 80
LOS: 1 days | End: 2020-10-09
Payer: MEDICARE

## 2020-10-09 ENCOUNTER — LABORATORY RESULT (OUTPATIENT)
Age: 80
End: 2020-10-09

## 2020-10-09 DIAGNOSIS — Z95.1 PRESENCE OF AORTOCORONARY BYPASS GRAFT: Chronic | ICD-10-CM

## 2020-10-09 DIAGNOSIS — M81.8 OTHER OSTEOPOROSIS WITHOUT CURRENT PATHOLOGICAL FRACTURE: ICD-10-CM

## 2020-10-09 PROCEDURE — 77080 DXA BONE DENSITY AXIAL: CPT

## 2020-10-09 PROCEDURE — 77080 DXA BONE DENSITY AXIAL: CPT | Mod: 26

## 2020-10-20 LAB
ALBUMIN SERPL ELPH-MCNC: 4.9 G/DL
ALDOLASE SERPL-CCNC: 3.5 U/L
ALP BLD-CCNC: 61 U/L
ALT SERPL-CCNC: 22 U/L
ANION GAP SERPL CALC-SCNC: 15 MMOL/L
AST SERPL-CCNC: 20 U/L
B BURGDOR IGG+IGM SER QL IB: NORMAL
BASOPHILS # BLD AUTO: 0.04 K/UL
BASOPHILS NFR BLD AUTO: 0.7 %
BILIRUB SERPL-MCNC: 0.3 MG/DL
BUN SERPL-MCNC: 18 MG/DL
CALCIUM SERPL-MCNC: 10 MG/DL
CCP AB SER IA-ACNC: <8 UNITS
CHLORIDE SERPL-SCNC: 96 MMOL/L
CK SERPL-CCNC: 82 U/L
CO2 SERPL-SCNC: 27 MMOL/L
CREAT SERPL-MCNC: 0.81 MG/DL
CRP SERPL-MCNC: 0.17 MG/DL
DEPRECATED KAPPA LC FREE/LAMBDA SER: 1.32 RATIO
ENA SS-A AB SER IA-ACNC: 4.8 AL
ENA SS-B AB SER IA-ACNC: <0.2 AL
EOSINOPHIL # BLD AUTO: 0.07 K/UL
EOSINOPHIL NFR BLD AUTO: 1.2 %
ERYTHROCYTE [SEDIMENTATION RATE] IN BLOOD BY WESTERGREN METHOD: 20 MM/HR
GLUCOSE SERPL-MCNC: 129 MG/DL
HCT VFR BLD CALC: 42 %
HGB BLD-MCNC: 13.4 G/DL
IGA SER QL IEP: 154 MG/DL
IGG SER QL IEP: 883 MG/DL
IGM SER QL IEP: 21 MG/DL
IMM GRANULOCYTES NFR BLD AUTO: 0.2 %
KAPPA LC CSF-MCNC: 1.2 MG/DL
KAPPA LC SERPL-MCNC: 1.58 MG/DL
LDH SERPL-CCNC: 157 U/L
LYMPHOCYTES # BLD AUTO: 1.05 K/UL
LYMPHOCYTES NFR BLD AUTO: 17.7 %
M PROTEIN SPEC IFE-MCNC: NORMAL
MAGNESIUM SERPL-MCNC: 1.5 MG/DL
MAN DIFF?: NORMAL
MCHC RBC-ENTMCNC: 31.9 GM/DL
MCHC RBC-ENTMCNC: 32 PG
MCV RBC AUTO: 100.2 FL
MONOCYTES # BLD AUTO: 0.58 K/UL
MONOCYTES NFR BLD AUTO: 9.8 %
NEUTROPHILS # BLD AUTO: 4.17 K/UL
NEUTROPHILS NFR BLD AUTO: 70.4 %
PHOSPHATE SERPL-MCNC: 3.2 MG/DL
PLATELET # BLD AUTO: 263 K/UL
POTASSIUM SERPL-SCNC: 5.4 MMOL/L
PROT SERPL-MCNC: 7 G/DL
RBC # BLD: 4.19 M/UL
RBC # FLD: 13.2 %
RF+CCP IGG SER-IMP: NEGATIVE
RHEUMATOID FACT SER QL: <10 IU/ML
SODIUM SERPL-SCNC: 138 MMOL/L
WBC # FLD AUTO: 5.92 K/UL

## 2020-10-27 ENCOUNTER — RX RENEWAL (OUTPATIENT)
Age: 80
End: 2020-10-27

## 2020-10-29 ENCOUNTER — APPOINTMENT (OUTPATIENT)
Dept: RHEUMATOLOGY | Facility: CLINIC | Age: 80
End: 2020-10-29
Payer: MEDICARE

## 2020-10-29 VITALS
OXYGEN SATURATION: 92 % | WEIGHT: 178 LBS | HEIGHT: 71 IN | BODY MASS INDEX: 24.92 KG/M2 | RESPIRATION RATE: 17 BRPM | SYSTOLIC BLOOD PRESSURE: 124 MMHG | DIASTOLIC BLOOD PRESSURE: 80 MMHG | TEMPERATURE: 97.4 F | HEART RATE: 76 BPM

## 2020-10-29 DIAGNOSIS — Z87.39 PERSONAL HISTORY OF OTHER DISEASES OF THE MUSCULOSKELETAL SYSTEM AND CONNECTIVE TISSUE: ICD-10-CM

## 2020-10-29 DIAGNOSIS — R26.89 OTHER ABNORMALITIES OF GAIT AND MOBILITY: ICD-10-CM

## 2020-10-29 DIAGNOSIS — M85.80 OTHER SPECIFIED DISORDERS OF BONE DENSITY AND STRUCTURE, UNSPECIFIED SITE: ICD-10-CM

## 2020-10-29 DIAGNOSIS — R53.83 OTHER FATIGUE: ICD-10-CM

## 2020-10-29 PROCEDURE — 99214 OFFICE O/P EST MOD 30 MIN: CPT

## 2020-12-25 ENCOUNTER — INPATIENT (INPATIENT)
Facility: HOSPITAL | Age: 80
LOS: 3 days | Discharge: HOME | DRG: 65 | End: 2020-12-29
Attending: INTERNAL MEDICINE | Admitting: FAMILY MEDICINE
Payer: MEDICARE

## 2020-12-25 VITALS
HEART RATE: 56 BPM | RESPIRATION RATE: 20 BRPM | HEIGHT: 71 IN | OXYGEN SATURATION: 100 % | DIASTOLIC BLOOD PRESSURE: 85 MMHG | TEMPERATURE: 97 F | SYSTOLIC BLOOD PRESSURE: 153 MMHG

## 2020-12-25 DIAGNOSIS — I63.9 CEREBRAL INFARCTION, UNSPECIFIED: ICD-10-CM

## 2020-12-25 DIAGNOSIS — Z95.1 PRESENCE OF AORTOCORONARY BYPASS GRAFT: Chronic | ICD-10-CM

## 2020-12-25 LAB
ALBUMIN SERPL ELPH-MCNC: 4.1 G/DL — SIGNIFICANT CHANGE UP (ref 3.3–5.2)
ALP SERPL-CCNC: 59 U/L — SIGNIFICANT CHANGE UP (ref 40–120)
ALT FLD-CCNC: 17 U/L — SIGNIFICANT CHANGE UP
ANION GAP SERPL CALC-SCNC: 13 MMOL/L — SIGNIFICANT CHANGE UP (ref 5–17)
APTT BLD: 32.3 SEC — SIGNIFICANT CHANGE UP (ref 27.5–35.5)
AST SERPL-CCNC: 23 U/L — SIGNIFICANT CHANGE UP
BASOPHILS # BLD AUTO: 0.04 K/UL — SIGNIFICANT CHANGE UP (ref 0–0.2)
BASOPHILS NFR BLD AUTO: 0.7 % — SIGNIFICANT CHANGE UP (ref 0–2)
BILIRUB SERPL-MCNC: 0.5 MG/DL — SIGNIFICANT CHANGE UP (ref 0.4–2)
BUN SERPL-MCNC: 34 MG/DL — HIGH (ref 8–20)
CALCIUM SERPL-MCNC: 9.3 MG/DL — SIGNIFICANT CHANGE UP (ref 8.6–10.2)
CHLORIDE SERPL-SCNC: 100 MMOL/L — SIGNIFICANT CHANGE UP (ref 98–107)
CO2 SERPL-SCNC: 23 MMOL/L — SIGNIFICANT CHANGE UP (ref 22–29)
CREAT SERPL-MCNC: 0.94 MG/DL — SIGNIFICANT CHANGE UP (ref 0.5–1.3)
EOSINOPHIL # BLD AUTO: 0.12 K/UL — SIGNIFICANT CHANGE UP (ref 0–0.5)
EOSINOPHIL NFR BLD AUTO: 2.2 % — SIGNIFICANT CHANGE UP (ref 0–6)
GLUCOSE SERPL-MCNC: 121 MG/DL — HIGH (ref 70–99)
HCT VFR BLD CALC: 43 % — SIGNIFICANT CHANGE UP (ref 39–50)
HGB BLD-MCNC: 14 G/DL — SIGNIFICANT CHANGE UP (ref 13–17)
IMM GRANULOCYTES NFR BLD AUTO: 0.2 % — SIGNIFICANT CHANGE UP (ref 0–1.5)
INR BLD: 0.93 RATIO — SIGNIFICANT CHANGE UP (ref 0.88–1.16)
LYMPHOCYTES # BLD AUTO: 1.34 K/UL — SIGNIFICANT CHANGE UP (ref 1–3.3)
LYMPHOCYTES # BLD AUTO: 24.7 % — SIGNIFICANT CHANGE UP (ref 13–44)
MCHC RBC-ENTMCNC: 31.1 PG — SIGNIFICANT CHANGE UP (ref 27–34)
MCHC RBC-ENTMCNC: 32.6 GM/DL — SIGNIFICANT CHANGE UP (ref 32–36)
MCV RBC AUTO: 95.6 FL — SIGNIFICANT CHANGE UP (ref 80–100)
MONOCYTES # BLD AUTO: 0.55 K/UL — SIGNIFICANT CHANGE UP (ref 0–0.9)
MONOCYTES NFR BLD AUTO: 10.1 % — SIGNIFICANT CHANGE UP (ref 2–14)
NEUTROPHILS # BLD AUTO: 3.36 K/UL — SIGNIFICANT CHANGE UP (ref 1.8–7.4)
NEUTROPHILS NFR BLD AUTO: 62.1 % — SIGNIFICANT CHANGE UP (ref 43–77)
PLATELET # BLD AUTO: 220 K/UL — SIGNIFICANT CHANGE UP (ref 150–400)
POTASSIUM SERPL-MCNC: 4.7 MMOL/L — SIGNIFICANT CHANGE UP (ref 3.5–5.3)
POTASSIUM SERPL-SCNC: 4.7 MMOL/L — SIGNIFICANT CHANGE UP (ref 3.5–5.3)
PROT SERPL-MCNC: 7.1 G/DL — SIGNIFICANT CHANGE UP (ref 6.6–8.7)
PROTHROM AB SERPL-ACNC: 10.8 SEC — SIGNIFICANT CHANGE UP (ref 10.6–13.6)
RBC # BLD: 4.5 M/UL — SIGNIFICANT CHANGE UP (ref 4.2–5.8)
RBC # FLD: 13.3 % — SIGNIFICANT CHANGE UP (ref 10.3–14.5)
SARS-COV-2 RNA SPEC QL NAA+PROBE: SIGNIFICANT CHANGE UP
SODIUM SERPL-SCNC: 136 MMOL/L — SIGNIFICANT CHANGE UP (ref 135–145)
TROPONIN T SERPL-MCNC: <0.01 NG/ML — SIGNIFICANT CHANGE UP (ref 0–0.06)
WBC # BLD: 5.42 K/UL — SIGNIFICANT CHANGE UP (ref 3.8–10.5)
WBC # FLD AUTO: 5.42 K/UL — SIGNIFICANT CHANGE UP (ref 3.8–10.5)

## 2020-12-25 PROCEDURE — 70498 CT ANGIOGRAPHY NECK: CPT | Mod: 26

## 2020-12-25 PROCEDURE — 99285 EMERGENCY DEPT VISIT HI MDM: CPT | Mod: CS

## 2020-12-25 PROCEDURE — 93010 ELECTROCARDIOGRAM REPORT: CPT

## 2020-12-25 PROCEDURE — 70496 CT ANGIOGRAPHY HEAD: CPT | Mod: 26

## 2020-12-25 PROCEDURE — 99223 1ST HOSP IP/OBS HIGH 75: CPT | Mod: AI

## 2020-12-25 PROCEDURE — 0042T: CPT

## 2020-12-25 PROCEDURE — 71045 X-RAY EXAM CHEST 1 VIEW: CPT | Mod: 26

## 2020-12-25 RX ORDER — PANTOPRAZOLE SODIUM 20 MG/1
40 TABLET, DELAYED RELEASE ORAL
Refills: 0 | Status: DISCONTINUED | OUTPATIENT
Start: 2020-12-25 | End: 2020-12-29

## 2020-12-25 RX ORDER — CLOPIDOGREL BISULFATE 75 MG/1
75 TABLET, FILM COATED ORAL ONCE
Refills: 0 | Status: COMPLETED | OUTPATIENT
Start: 2020-12-25 | End: 2020-12-25

## 2020-12-25 RX ORDER — DEXTROSE 50 % IN WATER 50 %
12.5 SYRINGE (ML) INTRAVENOUS ONCE
Refills: 0 | Status: DISCONTINUED | OUTPATIENT
Start: 2020-12-25 | End: 2020-12-29

## 2020-12-25 RX ORDER — ATORVASTATIN CALCIUM 80 MG/1
80 TABLET, FILM COATED ORAL AT BEDTIME
Refills: 0 | Status: DISCONTINUED | OUTPATIENT
Start: 2020-12-25 | End: 2020-12-29

## 2020-12-25 RX ORDER — SODIUM CHLORIDE 9 MG/ML
1000 INJECTION INTRAMUSCULAR; INTRAVENOUS; SUBCUTANEOUS
Refills: 0 | Status: DISCONTINUED | OUTPATIENT
Start: 2020-12-25 | End: 2020-12-28

## 2020-12-25 RX ORDER — SODIUM CHLORIDE 9 MG/ML
1000 INJECTION, SOLUTION INTRAVENOUS
Refills: 0 | Status: DISCONTINUED | OUTPATIENT
Start: 2020-12-25 | End: 2020-12-29

## 2020-12-25 RX ORDER — ASPIRIN/CALCIUM CARB/MAGNESIUM 324 MG
300 TABLET ORAL DAILY
Refills: 0 | Status: DISCONTINUED | OUTPATIENT
Start: 2020-12-25 | End: 2020-12-25

## 2020-12-25 RX ORDER — INSULIN LISPRO 100/ML
VIAL (ML) SUBCUTANEOUS
Refills: 0 | Status: DISCONTINUED | OUTPATIENT
Start: 2020-12-25 | End: 2020-12-29

## 2020-12-25 RX ORDER — ASPIRIN/CALCIUM CARB/MAGNESIUM 324 MG
81 TABLET ORAL DAILY
Refills: 0 | Status: DISCONTINUED | OUTPATIENT
Start: 2020-12-25 | End: 2020-12-29

## 2020-12-25 RX ORDER — DEXTROSE 50 % IN WATER 50 %
25 SYRINGE (ML) INTRAVENOUS ONCE
Refills: 0 | Status: DISCONTINUED | OUTPATIENT
Start: 2020-12-25 | End: 2020-12-29

## 2020-12-25 RX ORDER — GLUCAGON INJECTION, SOLUTION 0.5 MG/.1ML
1 INJECTION, SOLUTION SUBCUTANEOUS ONCE
Refills: 0 | Status: DISCONTINUED | OUTPATIENT
Start: 2020-12-25 | End: 2020-12-29

## 2020-12-25 RX ORDER — DEXTROSE 50 % IN WATER 50 %
15 SYRINGE (ML) INTRAVENOUS ONCE
Refills: 0 | Status: DISCONTINUED | OUTPATIENT
Start: 2020-12-25 | End: 2020-12-29

## 2020-12-25 RX ORDER — ENOXAPARIN SODIUM 100 MG/ML
40 INJECTION SUBCUTANEOUS DAILY
Refills: 0 | Status: DISCONTINUED | OUTPATIENT
Start: 2020-12-25 | End: 2020-12-29

## 2020-12-25 RX ORDER — GABAPENTIN 400 MG/1
300 CAPSULE ORAL THREE TIMES A DAY
Refills: 0 | Status: DISCONTINUED | OUTPATIENT
Start: 2020-12-25 | End: 2020-12-29

## 2020-12-25 RX ORDER — ASPIRIN/CALCIUM CARB/MAGNESIUM 324 MG
325 TABLET ORAL ONCE
Refills: 0 | Status: COMPLETED | OUTPATIENT
Start: 2020-12-25 | End: 2020-12-25

## 2020-12-25 RX ADMIN — SODIUM CHLORIDE 75 MILLILITER(S): 9 INJECTION INTRAMUSCULAR; INTRAVENOUS; SUBCUTANEOUS at 18:58

## 2020-12-25 RX ADMIN — CLOPIDOGREL BISULFATE 75 MILLIGRAM(S): 75 TABLET, FILM COATED ORAL at 14:54

## 2020-12-25 RX ADMIN — ATORVASTATIN CALCIUM 80 MILLIGRAM(S): 80 TABLET, FILM COATED ORAL at 22:42

## 2020-12-25 RX ADMIN — GABAPENTIN 300 MILLIGRAM(S): 400 CAPSULE ORAL at 22:42

## 2020-12-25 RX ADMIN — Medication 325 MILLIGRAM(S): at 14:54

## 2020-12-25 NOTE — ED ADULT TRIAGE NOTE - CHIEF COMPLAINT QUOTE
Patient BIBA to ED today with c/o resolved slurred speech, non contrast head CT in ambulance is negative.  Patient last known well 12am today. Patient BIBA to ED today with c/o resolved slurred speech, non contrast head CT in ambulance is negative.  Patient last known well 12am today.  Code stroke called by Dr. Maya.

## 2020-12-25 NOTE — ED ADULT NURSE NOTE - CHIEF COMPLAINT QUOTE
Patient BIBA to ED today with c/o resolved slurred speech, non contrast head CT in ambulance is negative.  Patient last known well 12am today.  Code stroke called by Dr. Maya.

## 2020-12-25 NOTE — H&P ADULT - NSICDXPASTSURGICALHX_GEN_ALL_CORE_FT
PAST SURGICAL HISTORY:  CAD (coronary artery disease) of bypass graft 3 Vessel bypass graft    Rotator cuff injury h/o Left shoulder rotator cuff repair  x 2  2010    S/P CABG x 3     Spinal stenosis of lumbar region lumbar laminectomy spinal fusion

## 2020-12-25 NOTE — H&P ADULT - NSHPLABSRESULTS_GEN_ALL_CORE
LABS:                        14.0   5.42  )-----------( 220      ( 25 Dec 2020 12:51 )             43.0     12-25    136  |  100  |  34.0<H>  ----------------------------<  121<H>  4.7   |  23.0  |  0.94    Ca    9.3      25 Dec 2020 12:51    TPro  7.1  /  Alb  4.1  /  TBili  0.5  /  DBili  x   /  AST  23  /  ALT  17  /  AlkPhos  59  12-25    PT/INR - ( 25 Dec 2020 12:51 )   PT: 10.8 sec;   INR: 0.93 ratio         PTT - ( 25 Dec 2020 12:51 )  PTT:32.3 sec    LIVER FUNCTIONS - ( 25 Dec 2020 12:51 )  Alb: 4.1 g/dL / Pro: 7.1 g/dL / ALK PHOS: 59 U/L / ALT: 17 U/L / AST: 23 U/L / GGT: x

## 2020-12-25 NOTE — H&P ADULT - ASSESSMENT
# Possible acute CVA   with admit to tele   neuro check   ECHO , carotid doppler , MRI   neurology consult   PT/OT/ SPEECH   NPO .  aspirin suppository , follow up lipid panel  lipitor when able to swallow     # Hx of DLP  resume statin once able to swallow    # Hx of HTN   will allow for permissive hypertension for first 48 hour     # DVT prophylaxis  lovenox    # Possible acute CVA   with admit to tele   neuro check   ECHO , carotid doppler , MRI   neurology consult   PT/OT/ SPEECH   NPO .  aspirin suppository , follow up lipid panel  lipitor when able to swallow     # Hx of DLP  resume statin once able to swallow    # Hx of HTN   will allow for permissive hypertension for first 48 hour     # Hx of CAD   resume home regimen     # Hx of DM   will start sliding scale     # DVT prophylaxis  lovenox     details discussed with patient , his wife and step daughter Shruti , code status also discussed

## 2020-12-25 NOTE — ED PROVIDER NOTE - PHYSICAL EXAMINATION
Gen: Alert, NAD  Head: NC, AT, PERRL, EOMI, normal lids/conjunctiva  Neck: +supple, no tenderness/meningismus/JVD, +Trachea midline  Pulm: Bilateral BS, normal resp effort, no wheeze/stridor/retractions  CV: RRR, no M/R/G, 2+dist pulses  Abd: soft, NT/ND, +BS, no hepatosplenomegaly  Mskel: ROM intact x4 extremities.  no edema/erythema/cyanosis  Skin: no rash, warm, dry  Neuro: AAOx3, NIHSS = 4 (L UE drift, R UE drift, R LE drift, dysmetria, dysarthria)

## 2020-12-25 NOTE — ED PROVIDER NOTE - CLINICAL SUMMARY MEDICAL DECISION MAKING FREE TEXT BOX
patient arrived with new deficits,code stroke called, telestroke consulted, ct non-con, a/p with no acute findings. telestroke recommends admission to stroke unit, asa/plavix and high dose statin.  admission called, neuro consult called.

## 2020-12-25 NOTE — ED ADULT NURSE NOTE - NSIMPLEMENTINTERV_GEN_ALL_ED
Implemented All Universal Safety Interventions:  [place] to call system. Call bell, personal items and telephone within reach. Instruct patient to call for assistance. Room bathroom lighting operational. Non-slip footwear when patient is off stretcher. Physically safe environment: no spills, clutter or unnecessary equipment. Stretcher in lowest position, wheels locked, appropriate side rails in place.

## 2020-12-25 NOTE — ED PROVIDER NOTE - NS ED ROS FT
Constitutional: (-) fever  (-)chills  (-)sweats  Eyes/ENT: (-)   Cardiovascular: (-) chest pain, (-) palpitations (-) edema   Respiratory: (-) cough, (-) shortness of breath   Gastrointestinal: (-)nausea  (-)vomiting, (-) diarrhea  (-) abdominal pain   :  (-)dysuria, (-)frequency, (-)urgency, (-)hematuria  Musculoskeletal: (-) neck pain, (-) back pain, (-) joint pain  Integumentary: (-) rash, (-) edema  Neurological: (-) headache, (+) altered mental status  (-)LOC

## 2020-12-25 NOTE — H&P ADULT - NSHPPHYSICALEXAM_GEN_ALL_CORE
GENERAL:  Well-appearing, not in acute distress  EYES:  Clear conjuctiva, extraocular movement intact  RESP:  Non-labored breathing pattern, lungs clear to ausculation in anterior fields  CV: Regular rate and rhythm, no murmurs appreciated  GI: Soft, non-tender, non-distended  EXT: no pitting edema GENERAL:  Well-appearing, not in acute distress  EYES:  Clear conjuctiva, extraocular movement intact  RESP:  Non-labored breathing pattern, lungs clear to ausculation in anterior fields  CV: Regular rate and rhythm, no murmurs appreciated  GI: Soft, non-tender, non-distended  EXT: no pitting edema  able to move extremities , dysarthria

## 2020-12-25 NOTE — ED PROVIDER NOTE - OBJECTIVE STATEMENT
80yoM; with pmh signif for prior CVA( with residual R UE & LE weakness), HTN, HLD; now p/w new onset slurred speech, dysmetria and L UE pronatory drift. LKW 12am on 12/25/20.  code stroke called. patient with multiple falls over past 2 weeks. denies f/c/s. denies n/v. denies abd pain. denies numbness/tingling. denies headache.   PMH: CVA( with residual R UE & LE weakness), HTN, HLD  SOCIAL: No tobacco/illicit substance use/socialEtOH

## 2020-12-25 NOTE — ED ADULT NURSE NOTE - CHPI ED NUR SYMPTOMS NEG
no blurred vision/no change in level of consciousness/no confusion/no dizziness/no fever/no loss of consciousness/no nausea/no numbness/no vomiting
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
## 2020-12-25 NOTE — CHART NOTE - NSCHARTNOTEFT_GEN_A_CORE
Called by RN to clarify neuro check orders.  Neuro checks ordered for q2hr, however pt dispo is not for step down.  Reviewed H&P, day time hospitalist intended to admit to telemetry which only has capacity to do neuro checks q4hr.  Will modify orders.
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
## 2020-12-25 NOTE — H&P ADULT - HISTORY OF PRESENT ILLNESS
80 year old male with PMH  significant  for prior CVA( with residual R UE & LE weakness), HTN, HLD; now p/w new onset slurred speech, dysmetria and L UE pronatory drift. LKW 12am on 12/25/20.  code stroke called. patient with multiple falls over past 2 weeks. denies f/c/s. denies n/v. denies abd pain. denies numbness/tingling. denies headache.   imaging in ED was negative for stroke , patient was not a candidate for TPA due to lag between last well known and ED presentation    80 year old male with PMH  significant  for prior CVA( with residual R UE & LE weakness), HTN, HLD, CAD , CABG , RA , unsteady gait needing walker but patient is non compliant ; now p/w new onset slurred speech, dysmetria and L UE pronatory drift. LKW 12am on 12/25/20.  code stroke called. patient with multiple falls over past 2 weeks. denies f/c/s. denies n/v. denies abd pain. denies numbness/tingling. denies headache.   imaging in ED was negative for stroke , patient was not a candidate for TPA due to lag between last well known and ED presentation   at time of my evaluation patient was able to move all extremities but still has slurred speech , he denies any complains   passed bedside swallow evaluation   details were obtained from wife and step daughter on phone

## 2020-12-26 LAB
GLUCOSE BLDC GLUCOMTR-MCNC: 114 MG/DL — HIGH (ref 70–99)
GLUCOSE BLDC GLUCOMTR-MCNC: 120 MG/DL — HIGH (ref 70–99)
GLUCOSE BLDC GLUCOMTR-MCNC: 150 MG/DL — HIGH (ref 70–99)
GLUCOSE BLDC GLUCOMTR-MCNC: 359 MG/DL — HIGH (ref 70–99)

## 2020-12-26 PROCEDURE — 93306 TTE W/DOPPLER COMPLETE: CPT | Mod: 26

## 2020-12-26 PROCEDURE — 99223 1ST HOSP IP/OBS HIGH 75: CPT

## 2020-12-26 PROCEDURE — 99232 SBSQ HOSP IP/OBS MODERATE 35: CPT

## 2020-12-26 RX ADMIN — GABAPENTIN 300 MILLIGRAM(S): 400 CAPSULE ORAL at 21:29

## 2020-12-26 RX ADMIN — Medication 5: at 08:18

## 2020-12-26 RX ADMIN — ENOXAPARIN SODIUM 40 MILLIGRAM(S): 100 INJECTION SUBCUTANEOUS at 11:51

## 2020-12-26 RX ADMIN — GABAPENTIN 300 MILLIGRAM(S): 400 CAPSULE ORAL at 05:45

## 2020-12-26 RX ADMIN — ATORVASTATIN CALCIUM 80 MILLIGRAM(S): 80 TABLET, FILM COATED ORAL at 21:29

## 2020-12-26 RX ADMIN — Medication 81 MILLIGRAM(S): at 11:51

## 2020-12-26 RX ADMIN — GABAPENTIN 300 MILLIGRAM(S): 400 CAPSULE ORAL at 11:51

## 2020-12-26 RX ADMIN — SODIUM CHLORIDE 75 MILLILITER(S): 9 INJECTION INTRAMUSCULAR; INTRAVENOUS; SUBCUTANEOUS at 17:33

## 2020-12-26 RX ADMIN — PANTOPRAZOLE SODIUM 40 MILLIGRAM(S): 20 TABLET, DELAYED RELEASE ORAL at 05:45

## 2020-12-26 NOTE — PHYSICAL THERAPY INITIAL EVALUATION ADULT - DISCHARGE DISPOSITION, PT EVAL
acute rehab for strength, transfers, balance, endurance and ambulation/stair training pending progress, as pt is an increased falls risk and has multiple stairs to negotiate at home, deeming pt unsafe to return home at this time. pt would benefit from increased hours of rehab a day/3 hrs a day to help progress to functional goals.

## 2020-12-26 NOTE — SPEECH LANGUAGE PATHOLOGY EVALUATION - COMMENTS
TBA Unclear of baseline cognition 2/2 hx TBI and CVA TBA, pt reports right hand dominance with residual hemiparesis 2/2 old CVA
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
## 2020-12-26 NOTE — CONSULT NOTE ADULT - ASSESSMENT
The patient is a 80y Male with prior stroke now with possible TIA.     TIA/stroke  Continue antiplatelet and statin.   Check lipid panel.   Check echocardiogram.   Reportedly cannot have MRI due to spinal hardware.   Mobilize with physical therapy.     Hypertension   Avoid hypotension.     Discharge planning.   Awaiting PT eval for recommendations. ? Home vs rehab.     Case discussed with Dr Blackman.
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
## 2020-12-26 NOTE — PHYSICAL THERAPY INITIAL EVALUATION ADULT - IMPAIRMENTS CONTRIBUTING TO GAIT DEVIATIONS, PT EVAL
impaired balance/impaired coordination/impaired motor control/impaired postural control/decreased ROM/decreased strength

## 2020-12-26 NOTE — PHYSICAL THERAPY INITIAL EVALUATION ADULT - TRANSFER SAFETY CONCERNS NOTED: SIT/STAND, REHAB EVAL
decreased balance during turns/decreased safety awareness/decreased step length/decreased weight-shifting ability

## 2020-12-26 NOTE — PHYSICAL THERAPY INITIAL EVALUATION ADULT - GENERAL OBSERVATIONS, REHAB EVAL
Pt received in CDU, JOSELINE baron'ed pt for PT. pt observed semi-wilkins in bed, with IV, pleasant, cooperative, A&O and c/o 0/10 pain t/o eval.

## 2020-12-26 NOTE — PHYSICAL THERAPY INITIAL EVALUATION ADULT - PHYSICAL ASSIST/NONPHYSICAL ASSIST: STAIR NEGOTIATION, REHAB EVAL
required increased assistance to help maintain proper upright walking posture/stair sequencing  + verbal cues for proper use of handrails./1 person assist

## 2020-12-26 NOTE — PHYSICAL THERAPY INITIAL EVALUATION ADULT - GAIT DEVIATIONS NOTED, PT EVAL
footdrop right LE, flexed knee gait pattern/decreased millie/increased time in double stance/footdrop/decreased step length/decreased stride length/decreased weight-shifting ability

## 2020-12-26 NOTE — PHYSICAL THERAPY INITIAL EVALUATION ADULT - ASSISTIVE DEVICE FOR STAIR TRANSFER, REHAB EVAL
RW for 1 step and then 1 bedrail and contralateral hand held assist provided by treating PT for 2nd step

## 2020-12-26 NOTE — PHYSICAL THERAPY INITIAL EVALUATION ADULT - RANGE OF MOTION EXAMINATION, REHAB EVAL
Right UE ROM was WNL (within normal limits)/Left LE ROM was WNL (within normal limits)/bilateral lower extremity ROM was WFL (within functional limits)

## 2020-12-26 NOTE — PHYSICAL THERAPY INITIAL EVALUATION ADULT - PHYSICAL ASSIST/NONPHYSICAL ASSIST: SIT/SUPINE, REHAB EVAL
required increased assistance  to help get bilateral LE's into bed/assume proper semi-wilkins position + verbal cues for proper hand placement./1 person assist

## 2020-12-26 NOTE — PHYSICAL THERAPY INITIAL EVALUATION ADULT - COORDINATION ASSESSED, REHAB EVAL
left UE finger to nose, limited due to decreased shoulder ROM, however intact. right UE/finger to nose, intermittently intact, demonstrated dysmetria like pattern; Left LE/heel to shin, intact; right LE/heel to skin intermittently intact, demonstrated decreased coordinated movements/finger to nose/heel to shin

## 2020-12-26 NOTE — SWALLOW BEDSIDE ASSESSMENT ADULT - SLP PERTINENT HISTORY OF CURRENT PROBLEM
80 year old male with PMH  significant  for prior CVA( with residual R UE & LE weakness), HTN, HLD, CAD , CABG , RA , unsteady gait needing walker but patient is non compliant ; now p/w new onset slurred speech, dysmetria and L UE pronatory drift. LKW 12am on 12/25/20.  code stroke called. patient with multiple falls over past 2 weeks. denies f/c/s. denies n/v. denies abd pain. denies numbness/tingling. denies headache.

## 2020-12-26 NOTE — SPEECH LANGUAGE PATHOLOGY EVALUATION - SLP DIAGNOSIS
Receptive and expressive language skills are WFL for basic needs and wants. Pt able to express more complex thoughts and ideas; Trace-mild dysarthria affecting articulatory precision.

## 2020-12-26 NOTE — PHYSICAL THERAPY INITIAL EVALUATION ADULT - PHYSICAL ASSIST/NONPHYSICAL ASSIST: GAIT, REHAB EVAL
required increased assistance + verbal cues to help maintain proper upright walking posture + for proper use of AD. pt demonstrated decreased/limited knee ROM during ambulation (ambulated with flexed knees) + decreased right foot/ankle ROM(foot drop) during swing phase of gait/1 person assist

## 2020-12-26 NOTE — PHYSICAL THERAPY INITIAL EVALUATION ADULT - PHYSICAL ASSIST/NONPHYSICAL ASSIST: SUPINE/SIT, REHAB EVAL
required increased assistance to get bilateral LE's out of bed/assume upright sitting at EOB position + verbal cues for proper hand placement./1 person assist

## 2020-12-27 LAB
A1C WITH ESTIMATED AVERAGE GLUCOSE RESULT: 5.9 % — HIGH (ref 4–5.6)
ANION GAP SERPL CALC-SCNC: 12 MMOL/L — SIGNIFICANT CHANGE UP (ref 5–17)
BUN SERPL-MCNC: 22 MG/DL — HIGH (ref 8–20)
CALCIUM SERPL-MCNC: 9.3 MG/DL — SIGNIFICANT CHANGE UP (ref 8.6–10.2)
CHLORIDE SERPL-SCNC: 98 MMOL/L — SIGNIFICANT CHANGE UP (ref 98–107)
CHOLEST SERPL-MCNC: 125 MG/DL — SIGNIFICANT CHANGE UP
CO2 SERPL-SCNC: 24 MMOL/L — SIGNIFICANT CHANGE UP (ref 22–29)
CREAT SERPL-MCNC: 0.74 MG/DL — SIGNIFICANT CHANGE UP (ref 0.5–1.3)
ESTIMATED AVERAGE GLUCOSE: 123 MG/DL — HIGH (ref 68–114)
GLUCOSE BLDC GLUCOMTR-MCNC: 110 MG/DL — HIGH (ref 70–99)
GLUCOSE BLDC GLUCOMTR-MCNC: 144 MG/DL — HIGH (ref 70–99)
GLUCOSE BLDC GLUCOMTR-MCNC: 162 MG/DL — HIGH (ref 70–99)
GLUCOSE BLDC GLUCOMTR-MCNC: 200 MG/DL — HIGH (ref 70–99)
GLUCOSE SERPL-MCNC: 125 MG/DL — HIGH (ref 70–99)
HCT VFR BLD CALC: 41.7 % — SIGNIFICANT CHANGE UP (ref 39–50)
HDLC SERPL-MCNC: 39 MG/DL — LOW
HGB BLD-MCNC: 14.4 G/DL — SIGNIFICANT CHANGE UP (ref 13–17)
LIPID PNL WITH DIRECT LDL SERPL: 46 MG/DL — SIGNIFICANT CHANGE UP
MCHC RBC-ENTMCNC: 32.1 PG — SIGNIFICANT CHANGE UP (ref 27–34)
MCHC RBC-ENTMCNC: 34.5 GM/DL — SIGNIFICANT CHANGE UP (ref 32–36)
MCV RBC AUTO: 93.1 FL — SIGNIFICANT CHANGE UP (ref 80–100)
NON HDL CHOLESTEROL: 86 MG/DL — SIGNIFICANT CHANGE UP
PLATELET # BLD AUTO: 249 K/UL — SIGNIFICANT CHANGE UP (ref 150–400)
POTASSIUM SERPL-MCNC: 4.3 MMOL/L — SIGNIFICANT CHANGE UP (ref 3.5–5.3)
POTASSIUM SERPL-SCNC: 4.3 MMOL/L — SIGNIFICANT CHANGE UP (ref 3.5–5.3)
RBC # BLD: 4.48 M/UL — SIGNIFICANT CHANGE UP (ref 4.2–5.8)
RBC # FLD: 13.2 % — SIGNIFICANT CHANGE UP (ref 10.3–14.5)
SODIUM SERPL-SCNC: 134 MMOL/L — LOW (ref 135–145)
TRIGL SERPL-MCNC: 198 MG/DL — HIGH
WBC # BLD: 6.72 K/UL — SIGNIFICANT CHANGE UP (ref 3.8–10.5)
WBC # FLD AUTO: 6.72 K/UL — SIGNIFICANT CHANGE UP (ref 3.8–10.5)

## 2020-12-27 PROCEDURE — 99232 SBSQ HOSP IP/OBS MODERATE 35: CPT

## 2020-12-27 RX ORDER — LISINOPRIL 2.5 MG/1
10 TABLET ORAL DAILY
Refills: 0 | Status: DISCONTINUED | OUTPATIENT
Start: 2020-12-27 | End: 2020-12-28

## 2020-12-27 RX ORDER — LANOLIN ALCOHOL/MO/W.PET/CERES
5 CREAM (GRAM) TOPICAL AT BEDTIME
Refills: 0 | Status: DISCONTINUED | OUTPATIENT
Start: 2020-12-27 | End: 2020-12-29

## 2020-12-27 RX ORDER — ATENOLOL 25 MG/1
50 TABLET ORAL DAILY
Refills: 0 | Status: DISCONTINUED | OUTPATIENT
Start: 2020-12-27 | End: 2020-12-29

## 2020-12-27 RX ORDER — AMLODIPINE BESYLATE 2.5 MG/1
10 TABLET ORAL DAILY
Refills: 0 | Status: DISCONTINUED | OUTPATIENT
Start: 2020-12-27 | End: 2020-12-29

## 2020-12-27 RX ADMIN — GABAPENTIN 300 MILLIGRAM(S): 400 CAPSULE ORAL at 21:22

## 2020-12-27 RX ADMIN — ENOXAPARIN SODIUM 40 MILLIGRAM(S): 100 INJECTION SUBCUTANEOUS at 12:44

## 2020-12-27 RX ADMIN — GABAPENTIN 300 MILLIGRAM(S): 400 CAPSULE ORAL at 05:30

## 2020-12-27 RX ADMIN — ATORVASTATIN CALCIUM 80 MILLIGRAM(S): 80 TABLET, FILM COATED ORAL at 21:23

## 2020-12-27 RX ADMIN — Medication 1: at 17:24

## 2020-12-27 RX ADMIN — LISINOPRIL 10 MILLIGRAM(S): 2.5 TABLET ORAL at 21:22

## 2020-12-27 RX ADMIN — GABAPENTIN 300 MILLIGRAM(S): 400 CAPSULE ORAL at 12:44

## 2020-12-27 RX ADMIN — PANTOPRAZOLE SODIUM 40 MILLIGRAM(S): 20 TABLET, DELAYED RELEASE ORAL at 09:24

## 2020-12-27 RX ADMIN — Medication 81 MILLIGRAM(S): at 12:44

## 2020-12-27 RX ADMIN — Medication 1: at 12:46

## 2020-12-27 RX ADMIN — ATENOLOL 50 MILLIGRAM(S): 25 TABLET ORAL at 12:44

## 2020-12-27 RX ADMIN — AMLODIPINE BESYLATE 10 MILLIGRAM(S): 2.5 TABLET ORAL at 09:25

## 2020-12-27 RX ADMIN — Medication 5 MILLIGRAM(S): at 21:22

## 2020-12-28 ENCOUNTER — TRANSCRIPTION ENCOUNTER (OUTPATIENT)
Age: 80
End: 2020-12-28

## 2020-12-28 LAB
GLUCOSE BLDC GLUCOMTR-MCNC: 114 MG/DL — HIGH (ref 70–99)
GLUCOSE BLDC GLUCOMTR-MCNC: 116 MG/DL — HIGH (ref 70–99)
GLUCOSE BLDC GLUCOMTR-MCNC: 122 MG/DL — HIGH (ref 70–99)
GLUCOSE BLDC GLUCOMTR-MCNC: 129 MG/DL — HIGH (ref 70–99)
GLUCOSE BLDC GLUCOMTR-MCNC: 307 MG/DL — HIGH (ref 70–99)
SARS-COV-2 RNA SPEC QL NAA+PROBE: SIGNIFICANT CHANGE UP

## 2020-12-28 PROCEDURE — 99223 1ST HOSP IP/OBS HIGH 75: CPT

## 2020-12-28 PROCEDURE — 99232 SBSQ HOSP IP/OBS MODERATE 35: CPT

## 2020-12-28 RX ORDER — LISINOPRIL 2.5 MG/1
40 TABLET ORAL DAILY
Refills: 0 | Status: DISCONTINUED | OUTPATIENT
Start: 2020-12-28 | End: 2020-12-29

## 2020-12-28 RX ADMIN — ENOXAPARIN SODIUM 40 MILLIGRAM(S): 100 INJECTION SUBCUTANEOUS at 11:36

## 2020-12-28 RX ADMIN — Medication 5 MILLIGRAM(S): at 21:27

## 2020-12-28 RX ADMIN — GABAPENTIN 300 MILLIGRAM(S): 400 CAPSULE ORAL at 21:27

## 2020-12-28 RX ADMIN — ATORVASTATIN CALCIUM 80 MILLIGRAM(S): 80 TABLET, FILM COATED ORAL at 21:27

## 2020-12-28 RX ADMIN — GABAPENTIN 300 MILLIGRAM(S): 400 CAPSULE ORAL at 15:03

## 2020-12-28 RX ADMIN — GABAPENTIN 300 MILLIGRAM(S): 400 CAPSULE ORAL at 05:25

## 2020-12-28 RX ADMIN — Medication 81 MILLIGRAM(S): at 11:36

## 2020-12-28 RX ADMIN — AMLODIPINE BESYLATE 10 MILLIGRAM(S): 2.5 TABLET ORAL at 05:25

## 2020-12-28 RX ADMIN — Medication 4: at 11:36

## 2020-12-28 RX ADMIN — PANTOPRAZOLE SODIUM 40 MILLIGRAM(S): 20 TABLET, DELAYED RELEASE ORAL at 05:25

## 2020-12-28 NOTE — OCCUPATIONAL THERAPY INITIAL EVALUATION ADULT - LEVEL OF INDEPENDENCE: DRESS LOWER BODY, OT EVAL
Pt able to pull socks higher in sitting but needs assist to initially don socks, due to decreased sitting balance.  Assist with managing clothing over hips in standing./maximum assist (25% patients effort)

## 2020-12-28 NOTE — OCCUPATIONAL THERAPY INITIAL EVALUATION ADULT - VISUAL ACUITY
No new visual complaints.  Pt wears bifocals - he uses them for the reading portion, yet keeps them on throughout OT eval.  Pt reports having bilateral cataract surgery

## 2020-12-28 NOTE — OCCUPATIONAL THERAPY INITIAL EVALUATION ADULT - LEVEL OF CONSCIOUSNESS, OT EVAL
pt referring to being at home 2x initially during OT evaluation, but then states he's at hospital and names [place] without difficulty./alert/confused

## 2020-12-28 NOTE — OCCUPATIONAL THERAPY INITIAL EVALUATION ADULT - PHYSICAL ASSIST/NONPHYSICAL ASSIST: CHAIR TO BED, REHAB EVAL
verbal cues to straighten out body position in supine/verbal cues/nonverbal cues (demo/gestures)/1 person assist

## 2020-12-28 NOTE — OCCUPATIONAL THERAPY INITIAL EVALUATION ADULT - ORIENTATION, REHAB EVAL
pt initially disoriented to place but had just been wakened from sleep.  Pt states he's in hospital, but also referring to the "3 steps you saw when you walked in the house" when describing home environment./person/time

## 2020-12-28 NOTE — DISCHARGE NOTE NURSING/CASE MANAGEMENT/SOCIAL WORK - PATIENT PORTAL LINK FT
You can access the FollowMyHealth Patient Portal offered by [facility] by registering at the following website: [email or web address]. By joining [employer]’s FollowMyHealth portal, you will also be able to view your health information using other applications (apps) compatible with our system.

## 2020-12-28 NOTE — OCCUPATIONAL THERAPY INITIAL EVALUATION ADULT - BATHING, PREVIOUS LEVEL OF FUNCTION, OT EVAL
Tub with curtain and 2 grab bars.  During OT eval he states he has no shower seat./independent/needs device

## 2020-12-28 NOTE — OCCUPATIONAL THERAPY INITIAL EVALUATION ADULT - RANGE OF MOTION EXAMINATION, UPPER EXTREMITY
bilateral UE Active ROM was WNL (within normal limits) history of left shoulder replacement but active ROM WFL/bilateral UE Active ROM was WNL (within normal limits)

## 2020-12-28 NOTE — PROGRESS NOTE ADULT - ASSESSMENT
# Possible acute CVA versus TIA   on  tele   neuro check   ECHO normal  , carotid doppler normal  , unable to do MRI due to rods in back   seen by neurology   seen by PT , remains at fall risk , recs are for acute rehab , consult requested   on aspirin and lipitor     # Hx of DLP  on statin     # Hx of HTN   blood pressure uncontrolled   its been more than 48 hours   will resume anti hypertensive regimen     # Hx of CAD   on  home regimen     # Hx of DM   on  sliding scale     # DVT prophylaxis  lovenox     # Disposition   acute rehab     details discussed with patient , his wife  code status also discussed     
# Possible acute CVA versus TIA   on  tele   neuro check   ECHO normal  , carotid doppler normal  , unable to do MRI due to rods in back   seen by neurology   seen by PT , remains at fall risk , recs are for acute rehab , consult requested   on aspirin and lipitor     # Hx of DLP  on statin     # Hx of HTN   blood pressure uncontrolled   will increase lisinopril     # Hx of CAD   on  home regimen     # Hx of DM   on  sliding scale     # DVT prophylaxis  lovenox     # Disposition   acute rehab     details discussed with patient and wife updated too    
The patient is a 80y Male with prior stroke now with possible TIA.     TIA/stroke  Continue antiplatelet and statin.   Check lipid panel.   Check echocardiogram.   Reportedly cannot have MRI due to spinal hardware.   Mobilize with physical therapy.     Hypertension   Avoid hypotension.     Discharge planning.   Awaiting PT eval for recommendations. ? Home vs rehab.     No further specific neurologic recommendations. Will be available as needed.     Case discussed with Dr Blackman.   
# Possible acute CVA   on  tele   neuro check   ECHO normal  , carotid doppler normal  , unable to do MRI due to rods in back   seen by neurology   seen by PT , remains at fall risk , recs are for acute rehab , consult requested   on aspirin and lipitor     # Hx of DLP  on statin     # Hx of HTN   will allow for permissive hypertension for first 48 hour     # Hx of CAD   on  home regimen     # Hx of DM   will start sliding scale     # DVT prophylaxis  lovenox     details discussed with patient , his wife  code status also discussed   patient is not interested in rehab will try to convince him , patient might sign out AMA   he is alert awake oriented and does have decision making capacity
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
## 2020-12-28 NOTE — OCCUPATIONAL THERAPY INITIAL EVALUATION ADULT - PERSONAL SAFETY AND JUDGMENT, REHAB EVAL
decreased awareness of imbalance in sitting and during mobility, disoriented initially during eval.  Ongoing assessment recommended for progress./at risk behaviors demonstrated

## 2020-12-29 ENCOUNTER — INPATIENT (INPATIENT)
Facility: HOSPITAL | Age: 80
LOS: 9 days | Discharge: HOME | DRG: 949 | End: 2021-01-08
Attending: PSYCHIATRY & NEUROLOGY | Admitting: PHYSICAL MEDICINE & REHABILITATION
Payer: MEDICARE

## 2020-12-29 ENCOUNTER — TRANSCRIPTION ENCOUNTER (OUTPATIENT)
Age: 80
End: 2020-12-29

## 2020-12-29 VITALS
SYSTOLIC BLOOD PRESSURE: 154 MMHG | TEMPERATURE: 98 F | HEART RATE: 59 BPM | OXYGEN SATURATION: 95 % | DIASTOLIC BLOOD PRESSURE: 83 MMHG | RESPIRATION RATE: 18 BRPM

## 2020-12-29 VITALS — WEIGHT: 169.98 LBS | HEIGHT: 71 IN

## 2020-12-29 DIAGNOSIS — Z95.1 PRESENCE OF AORTOCORONARY BYPASS GRAFT: Chronic | ICD-10-CM

## 2020-12-29 DIAGNOSIS — I63.9 CEREBRAL INFARCTION, UNSPECIFIED: ICD-10-CM

## 2020-12-29 LAB
GLUCOSE BLDC GLUCOMTR-MCNC: 110 MG/DL — HIGH (ref 70–99)
GLUCOSE BLDC GLUCOMTR-MCNC: 135 MG/DL — HIGH (ref 70–99)
GLUCOSE BLDC GLUCOMTR-MCNC: 144 MG/DL — HIGH (ref 70–99)

## 2020-12-29 PROCEDURE — 93005 ELECTROCARDIOGRAM TRACING: CPT

## 2020-12-29 PROCEDURE — 0042T: CPT

## 2020-12-29 PROCEDURE — 92610 EVALUATE SWALLOWING FUNCTION: CPT

## 2020-12-29 PROCEDURE — 36415 COLL VENOUS BLD VENIPUNCTURE: CPT

## 2020-12-29 PROCEDURE — 99239 HOSP IP/OBS DSCHRG MGMT >30: CPT

## 2020-12-29 PROCEDURE — 80053 COMPREHEN METABOLIC PANEL: CPT

## 2020-12-29 PROCEDURE — 80048 BASIC METABOLIC PNL TOTAL CA: CPT

## 2020-12-29 PROCEDURE — 93306 TTE W/DOPPLER COMPLETE: CPT

## 2020-12-29 PROCEDURE — 70496 CT ANGIOGRAPHY HEAD: CPT

## 2020-12-29 PROCEDURE — 85025 COMPLETE CBC W/AUTO DIFF WBC: CPT

## 2020-12-29 PROCEDURE — 97116 GAIT TRAINING THERAPY: CPT

## 2020-12-29 PROCEDURE — 83036 HEMOGLOBIN GLYCOSYLATED A1C: CPT

## 2020-12-29 PROCEDURE — 97530 THERAPEUTIC ACTIVITIES: CPT

## 2020-12-29 PROCEDURE — 99233 SBSQ HOSP IP/OBS HIGH 50: CPT

## 2020-12-29 PROCEDURE — 92523 SPEECH SOUND LANG COMPREHEN: CPT

## 2020-12-29 PROCEDURE — U0003: CPT

## 2020-12-29 PROCEDURE — 93880 EXTRACRANIAL BILAT STUDY: CPT

## 2020-12-29 PROCEDURE — 82962 GLUCOSE BLOOD TEST: CPT

## 2020-12-29 PROCEDURE — 97167 OT EVAL HIGH COMPLEX 60 MIN: CPT

## 2020-12-29 PROCEDURE — 97110 THERAPEUTIC EXERCISES: CPT

## 2020-12-29 PROCEDURE — 70450 CT HEAD/BRAIN W/O DYE: CPT

## 2020-12-29 PROCEDURE — 70498 CT ANGIOGRAPHY NECK: CPT

## 2020-12-29 PROCEDURE — 85610 PROTHROMBIN TIME: CPT

## 2020-12-29 PROCEDURE — 99285 EMERGENCY DEPT VISIT HI MDM: CPT | Mod: 25

## 2020-12-29 PROCEDURE — 97163 PT EVAL HIGH COMPLEX 45 MIN: CPT

## 2020-12-29 PROCEDURE — 85027 COMPLETE CBC AUTOMATED: CPT

## 2020-12-29 PROCEDURE — 80061 LIPID PANEL: CPT

## 2020-12-29 PROCEDURE — 71045 X-RAY EXAM CHEST 1 VIEW: CPT

## 2020-12-29 PROCEDURE — 85730 THROMBOPLASTIN TIME PARTIAL: CPT

## 2020-12-29 PROCEDURE — 84484 ASSAY OF TROPONIN QUANT: CPT

## 2020-12-29 RX ORDER — METFORMIN HYDROCHLORIDE 850 MG/1
1000 TABLET ORAL
Refills: 0 | Status: DISCONTINUED | OUTPATIENT
Start: 2020-12-29 | End: 2021-01-08

## 2020-12-29 RX ORDER — ATORVASTATIN CALCIUM 80 MG/1
1 TABLET, FILM COATED ORAL
Qty: 0 | Refills: 0 | DISCHARGE
Start: 2020-12-29

## 2020-12-29 RX ORDER — ATENOLOL 25 MG/1
50 TABLET ORAL DAILY
Refills: 0 | Status: DISCONTINUED | OUTPATIENT
Start: 2020-12-29 | End: 2021-01-04

## 2020-12-29 RX ORDER — AMLODIPINE BESYLATE 2.5 MG/1
10 TABLET ORAL DAILY
Refills: 0 | Status: DISCONTINUED | OUTPATIENT
Start: 2020-12-29 | End: 2021-01-04

## 2020-12-29 RX ORDER — DULOXETINE HYDROCHLORIDE 30 MG/1
20 CAPSULE, DELAYED RELEASE ORAL DAILY
Refills: 0 | Status: DISCONTINUED | OUTPATIENT
Start: 2020-12-29 | End: 2021-01-08

## 2020-12-29 RX ORDER — GABAPENTIN 400 MG/1
300 CAPSULE ORAL THREE TIMES A DAY
Refills: 0 | Status: DISCONTINUED | OUTPATIENT
Start: 2020-12-29 | End: 2021-01-08

## 2020-12-29 RX ORDER — ASPIRIN/CALCIUM CARB/MAGNESIUM 324 MG
81 TABLET ORAL DAILY
Refills: 0 | Status: DISCONTINUED | OUTPATIENT
Start: 2020-12-29 | End: 2021-01-08

## 2020-12-29 RX ORDER — LISINOPRIL 2.5 MG/1
1 TABLET ORAL
Qty: 0 | Refills: 0 | DISCHARGE

## 2020-12-29 RX ORDER — ATORVASTATIN CALCIUM 80 MG/1
1 TABLET, FILM COATED ORAL
Qty: 0 | Refills: 0 | DISCHARGE

## 2020-12-29 RX ORDER — LISINOPRIL 2.5 MG/1
40 TABLET ORAL DAILY
Refills: 0 | Status: DISCONTINUED | OUTPATIENT
Start: 2020-12-29 | End: 2021-01-04

## 2020-12-29 RX ORDER — LANOLIN ALCOHOL/MO/W.PET/CERES
3 CREAM (GRAM) TOPICAL AT BEDTIME
Refills: 0 | Status: DISCONTINUED | OUTPATIENT
Start: 2020-12-29 | End: 2021-01-08

## 2020-12-29 RX ORDER — AMLODIPINE BESYLATE 2.5 MG/1
1 TABLET ORAL
Qty: 0 | Refills: 0 | DISCHARGE

## 2020-12-29 RX ORDER — LISINOPRIL 2.5 MG/1
1 TABLET ORAL
Qty: 0 | Refills: 0 | DISCHARGE
Start: 2020-12-29

## 2020-12-29 RX ORDER — PANTOPRAZOLE SODIUM 20 MG/1
40 TABLET, DELAYED RELEASE ORAL
Refills: 0 | Status: DISCONTINUED | OUTPATIENT
Start: 2020-12-29 | End: 2021-01-08

## 2020-12-29 RX ORDER — SENNA PLUS 8.6 MG/1
2 TABLET ORAL AT BEDTIME
Refills: 0 | Status: DISCONTINUED | OUTPATIENT
Start: 2020-12-29 | End: 2021-01-08

## 2020-12-29 RX ORDER — AMLODIPINE BESYLATE 2.5 MG/1
1 TABLET ORAL
Qty: 0 | Refills: 0 | DISCHARGE
Start: 2020-12-29

## 2020-12-29 RX ORDER — ATORVASTATIN CALCIUM 80 MG/1
80 TABLET, FILM COATED ORAL AT BEDTIME
Refills: 0 | Status: DISCONTINUED | OUTPATIENT
Start: 2020-12-29 | End: 2021-01-08

## 2020-12-29 RX ORDER — ENOXAPARIN SODIUM 100 MG/ML
40 INJECTION SUBCUTANEOUS DAILY
Refills: 0 | Status: DISCONTINUED | OUTPATIENT
Start: 2020-12-29 | End: 2021-01-08

## 2020-12-29 RX ADMIN — ATENOLOL 50 MILLIGRAM(S): 25 TABLET ORAL at 05:07

## 2020-12-29 RX ADMIN — GABAPENTIN 300 MILLIGRAM(S): 400 CAPSULE ORAL at 16:09

## 2020-12-29 RX ADMIN — GABAPENTIN 300 MILLIGRAM(S): 400 CAPSULE ORAL at 22:03

## 2020-12-29 RX ADMIN — Medication 3 MILLIGRAM(S): at 22:03

## 2020-12-29 RX ADMIN — AMLODIPINE BESYLATE 10 MILLIGRAM(S): 2.5 TABLET ORAL at 05:07

## 2020-12-29 RX ADMIN — ATORVASTATIN CALCIUM 80 MILLIGRAM(S): 80 TABLET, FILM COATED ORAL at 22:03

## 2020-12-29 RX ADMIN — PANTOPRAZOLE SODIUM 40 MILLIGRAM(S): 20 TABLET, DELAYED RELEASE ORAL at 05:07

## 2020-12-29 RX ADMIN — Medication 81 MILLIGRAM(S): at 11:41

## 2020-12-29 RX ADMIN — METFORMIN HYDROCHLORIDE 1000 MILLIGRAM(S): 850 TABLET ORAL at 22:03

## 2020-12-29 RX ADMIN — ENOXAPARIN SODIUM 40 MILLIGRAM(S): 100 INJECTION SUBCUTANEOUS at 11:41

## 2020-12-29 RX ADMIN — GABAPENTIN 300 MILLIGRAM(S): 400 CAPSULE ORAL at 05:07

## 2020-12-29 RX ADMIN — DULOXETINE HYDROCHLORIDE 20 MILLIGRAM(S): 30 CAPSULE, DELAYED RELEASE ORAL at 22:03

## 2020-12-29 RX ADMIN — LISINOPRIL 40 MILLIGRAM(S): 2.5 TABLET ORAL at 05:07

## 2020-12-29 NOTE — DISCHARGE NOTE PROVIDER - CARE PROVIDER_API CALL
Robert Duran  NEUROLOGY  [address] [place] [address], [facility] [address]  [place] [address]  Phone: [phone]  Fax: [phone]  Follow Up Time: 1 week

## 2020-12-29 NOTE — DISCHARGE NOTE PROVIDER - HOSPITAL COURSE
Hospital course :    10 year old male with PMH  significant  for prior CVA( with residual R UE & LE weakness), HTN, HLD, CAD , CABG , RA , unsteady gait needing walker but patient is non compliant ; now p/w new onset slurred speech, dysmetria and L UE pronatory drift. LKW 12am on 12/25/20.  code stroke called. patient with multiple falls over past 2 weeks. denies f/c/s. denies n/v. denies abd pain. denies numbness/tingling. denies headache.   imaging in ED was negative for stroke , patient was not a candidate for TPA due to lag between last well known and ED presentation   ECHO normal , carotid doppler negative , unable to do MRI due to rods in back .  seen by PT as per recs patient will be discharged to acute rehab   his blood pressure was elevated , medications adjusted   this presentation could be TIA versus stroke but speech improved during hospital stay       Physical Exam :  Vitals : reviewed , stable   GEN : age appropriate , resting , NAD   HEART: S1,S2 RRR   LUNGS: CTAB   ABDOMEN: Soft , NT , ND , positive bowel sounds   EXT: no pitting edema         35 minutes were spent on discharge co ordination .

## 2020-12-29 NOTE — H&P ADULT - NSHPREVIEWOFSYSTEMS_GEN_ALL_CORE
REVIEW OF SYSTEMS:  CONSTITUTIONAL: No fever, weight loss, or fatigue  EYES: No eye pain, visual disturbances, or discharge  ENMT:  No difficulty hearing, tinnitus, vertigo; No sinus or throat pain  NECK: No pain or stiffness  BREASTS: No pain, masses, or nipple discharge  RESPIRATORY: No cough, wheezing, chills or hemoptysis; No shortness of breath  CARDIOVASCULAR: No chest pain, palpitations, dizziness, or leg swelling  GASTROINTESTINAL: No abdominal or epigastric pain. No nausea, vomiting, or hematemesis; No diarrhea or constipation. No melena or hematochezia.  GENITOURINARY: No dysuria, frequency, hematuria, or incontinence  NEUROLOGICAL: No headaches, memory loss, loss of strength, numbness, or tremors  SKIN: No itching, burning, rashes, or lesions   LYMPH NODES: No enlarged glands  ENDOCRINE: No heat or cold intolerance; No hair loss  MUSCULOSKELETAL: No joint pain or swelling; No muscle, back, or extremity pain  PSYCHIATRIC: No depression, anxiety, mood swings, or difficulty sleeping  HEME/LYMPH: No easy bruising, or bleeding gums  ALLERY AND IMMUNOLOGIC: No hives or eczema REVIEW OF SYSTEMS:  CONSTITUTIONAL: No fever, weight loss, or fatigue  EYES: No eye pain, visual disturbances, or discharge  ENMT:  No difficulty hearing, tinnitus, vertigo; No sinus or throat pain  NECK: No pain or stiffness  BREASTS: No pain, masses, or nipple discharge  RESPIRATORY: No cough, wheezing, chills or hemoptysis; No shortness of breath  CARDIOVASCULAR: No chest pain, palpitations, dizziness, or leg swelling  GASTROINTESTINAL: No abdominal or epigastric pain. No nausea, vomiting, or hematemesis; No diarrhea or constipation. No melena or hematochezia.  GENITOURINARY: No dysuria, frequency, hematuria, or incontinence  NEUROLOGICAL: No headaches, memory loss, loss of strength, +numbness  SKIN: No itching, burning, rashes, or lesions   LYMPH NODES: No enlarged glands  ENDOCRINE: No heat or cold intolerance; No hair loss  MUSCULOSKELETAL: +chronic diffuse pain  PSYCHIATRIC: No depression, anxiety, mood swings, or difficulty sleeping  HEME/LYMPH: No easy bruising, or bleeding gums  ALLERY AND IMMUNOLOGIC: No hives or eczema

## 2020-12-29 NOTE — PROGRESS NOTE ADULT - SUBJECTIVE AND OBJECTIVE BOX
CC: possible stroke     INTERVAL HPI/OVERNIGHT EVENTS: seen and examined , speech is improved , patient wants to go home   seen by PT , deemed unsafe for discharging home           Vital Signs Last 24 Hrs  T(C): 36.2 (26 Dec 2020 15:22), Max: 36.8 (26 Dec 2020 11:28)  T(F): 97.2 (26 Dec 2020 15:22), Max: 98.3 (26 Dec 2020 11:28)  HR: 71 (26 Dec 2020 15:22) (54 - 71)  BP: 153/77 (26 Dec 2020 15:22) (117/68 - 157/78)  BP(mean): --  RR: 19 (26 Dec 2020 15:22) (19 - 20)  SpO2: 94% (26 Dec 2020 15:22) (94% - 97%)    PHYSICAL EXAM:    GENERAL: NAD, resting comfortable in bed   CHEST/LUNG: CTAB , no wheezing , rales, rhonchi heard   HEART: S1S2+, Regular rate and rhythm; No murmurs, rubs, or gallops  ABDOMEN: Soft, Nontender, Nondistended; Bowel sounds present  EXTREMITIES:  no pitting edema , pulses palpated BL.        LABS:                        14.0   5.42  )-----------( 220      ( 25 Dec 2020 12:51 )             43.0     12-25    136  |  100  |  34.0<H>  ----------------------------<  121<H>  4.7   |  23.0  |  0.94    Ca    9.3      25 Dec 2020 12:51    TPro  7.1  /  Alb  4.1  /  TBili  0.5  /  DBili  x   /  AST  23  /  ALT  17  /  AlkPhos  59  12-25    PT/INR - ( 25 Dec 2020 12:51 )   PT: 10.8 sec;   INR: 0.93 ratio         PTT - ( 25 Dec 2020 12:51 )  PTT:32.3 sec        MEDICATIONS  (STANDING):  aspirin  chewable 81 milliGRAM(s) Oral daily  atorvastatin 80 milliGRAM(s) Oral at bedtime  dextrose 40% Gel 15 Gram(s) Oral once  dextrose 5%. 1000 milliLiter(s) (50 mL/Hr) IV Continuous <Continuous>  dextrose 5%. 1000 milliLiter(s) (100 mL/Hr) IV Continuous <Continuous>  dextrose 50% Injectable 25 Gram(s) IV Push once  dextrose 50% Injectable 12.5 Gram(s) IV Push once  dextrose 50% Injectable 25 Gram(s) IV Push once  enoxaparin Injectable 40 milliGRAM(s) SubCutaneous daily  gabapentin 300 milliGRAM(s) Oral three times a day  glucagon  Injectable 1 milliGRAM(s) IntraMuscular once  insulin lispro (ADMELOG) corrective regimen sliding scale   SubCutaneous three times a day before meals  pantoprazole    Tablet 40 milliGRAM(s) Oral before breakfast  sodium chloride 0.9%. 1000 milliLiter(s) (75 mL/Hr) IV Continuous <Continuous>    MEDICATIONS  (PRN):      RADIOLOGY & ADDITIONAL TESTS:  
Patient doing well.  He is looking to go to rehab.  Reiterated that he has been walking after his stroke.     REVIEW OF SYSTEMS  Constitutional - No fever,  No fatigue  Neurological - +memory loss, +loss of strength    FUNCTIONAL PROGRESS  12/28  Bed Mobility  Bed Mobility Training Supine-to-Sit: minimum assist (75% patient effort);  1 person assist;  verbal cues  Bed Mobility Training Limitations: decreased ability to use arms for pushing/pulling;  decreased ability to use legs for bridging/pushing;  impaired ability to control trunk for mobility;  decreased strength;  impaired balance;  impaired coordination    Bed-Chair Transfer Training  Transfer Training Bed-to-Chair Transfer: 1 person assist;  verbal cues;  rolling walker;  minimum assist (75% patient effort)  Transfer Training Chair-to-Bed Transfer: 1 person assist;  minimum assist (75% patient effort);  verbal cues;  handheld   Bed-to-Chair Transfer Training Transfer Safety Analysis: decreased strength;  impaired balance;  impaired coordination;  rolling walker    Sit-Stand Transfer Training  Transfer Training Sit-to-Stand Transfer: moderate assist (50% patient effort);  1 person assist;  verbal cues;  rolling walker  Transfer Training Stand-to-Sit Transfer: minimum assist (75% patient effort);  1 person assist;  verbal cues;  rolling walker  Sit-to-Stand Transfer Training Transfer Safety Analysis: decreased strength;  impaired balance;  impaired coordination;  rolling walker    Gait Training  Gait Training: moderate assist (50% patient effort);  1 person assist;  verbal cues;  rolling walker;  40' including left and right turns  Gait Analysis: 3-point gait   decreased millie;  decreased step length;  decreased stride length;  decreased weight-shifting ability;  knee flexion throughout, decreased right heel strike;  decreased strength;  impaired balance;  impaired coordination;  40';  rolling walker    Bathing Training:     · Level of [place]	moderate assist (50% patients effort); sponge bathe seated  · Physical Assist/Nonphysical Assist	1 person assist; verbal cues    Upper Body Dressing Training:     · Level of [place]	minimum assist (75% patients effort); decreased balance observed in sitting    Lower Body Dressing Training:     · Level of [place]	maximum assist (25% patients effort); Pt able to pull socks higher in sitting but needs assist to initially don socks, due to decreased sitting balance.  Assist with managing clothing over hips in standing.  · Physical Assist/Nonphysical Assist	1 person assist; verbal cues    Toilet Hygiene Training:     · Level of [place]	maximum assist (25% patients effort)  · Physical Assist/Nonphysical Assist	verbal cues; 1 person assist  · Assistive Device	grab bars; rolling walker    Grooming Training:     · Level of [place]	minimum assist (75% patients effort)    Eating/Self-Feeding Training:     · Level of [place]	supervision  · Physical Assist/Nonphysical Assist	set-up required    12/26  · Diagnosis	Receptive and expressive language skills are WFL for basic needs and wants. Pt able to express more complex thoughts and ideas; Trace-mild dysarthria affecting articulatory precision.      VITALS  T(C): 36.4 (12-29-20 @ 07:48), Max: 36.8 (12-28-20 @ 19:42)  HR: 56 (12-29-20 @ 07:48) (56 - 64)  BP: 132/64 (12-29-20 @ 07:48) (117/63 - 134/76)  RR: 18 (12-29-20 @ 07:48) (16 - 18)  SpO2: 92% (12-29-20 @ 07:48) (92% - 96%)  Wt(kg): --    MEDICATIONS   amLODIPine   Tablet 10 milliGRAM(s) daily  aspirin  chewable 81 milliGRAM(s) daily  ATENolol  Tablet 50 milliGRAM(s) daily  atorvastatin 80 milliGRAM(s) at bedtime  dextrose 40% Gel 15 Gram(s) once  dextrose 5%. 1000 milliLiter(s) <Continuous>  dextrose 5%. 1000 milliLiter(s) <Continuous>  dextrose 50% Injectable 25 Gram(s) once  dextrose 50% Injectable 12.5 Gram(s) once  dextrose 50% Injectable 25 Gram(s) once  enoxaparin Injectable 40 milliGRAM(s) daily  gabapentin 300 milliGRAM(s) three times a day  glucagon  Injectable 1 milliGRAM(s) once  insulin lispro (ADMELOG) corrective regimen sliding scale   three times a day before meals  lisinopril 40 milliGRAM(s) daily  melatonin 5 milliGRAM(s) at bedtime  pantoprazole    Tablet 40 milliGRAM(s) before breakfast      RECENT LABS/IMAGING                          14.4   6.72  )-----------( 249      ( 27 Dec 2020 13:32 )             41.7     12-27    134<L>  |  98  |  22.0<H>  ----------------------------<  125<H>  4.3   |  24.0  |  0.74    Ca    9.3      27 Dec 2020 14:41              CT HEAD: No acute intracranial findings. These findings were discussed with Dr. Faiza Maya of the patient's clinical team on 12/25/2020 at approximately 1:02 PM.    CT PERFUSION: No mismatch perfusion deficit.    CT ANGIOGRAPHY NECK: Mild narrowing of the origin/proximal right cervical ICA. Status post left carotid endarterectomy.  No vessel occlusion in the neck.    CT ANGIOGRAPHY HEAD: Mild narrowing cavernous and supraclinoid ICAs. No vessel occlusion, aneurysm or vascular malformation.      ----------------------------------------------------------------------------------------  PHYSICAL EXAM  Constitutional - NAD, Comfortable  Extremities - No C/C/E, No calf tenderness   Neurologic Exam -                    Cognitive - Awake, Alert, AAO to self, PART situation - forgets ongoing information     Communication - Fluent, Mild dysarthria     Cranial Nerves - Right lip droop - preexisting     Motor -                      LEFT    UE - ShAB 2/5, EF 4/5, EE 4/5, WE 5/5,  5/5                    RIGHT UE - ShAB 5/5, EF 5/5, EE 4/5, WE 2/5,  5/5                    LEFT    LE - HF 4/5, KE 5/5, DF 2/5, PF 5/5                    RIGHT LE - HF 4/5, KE 4/5, DF 3/5, PF 5/5        Sensory - Intact to LT  Psychiatric - Mood stable, Affect WNL  ----------------------------------------------------------------------------------------  ASSESSMENT/PLAN  80yMale with functional deficits after having clinical CVA  Clinical CVA - ASA, Lipitor  HTN - Lisinopril, Norvasc, Atenolol  Sleep - Melatonin  Pain - Tylenol, Neurontin  DVT PPX - SCDs, Lovenox  Rehab - PLEASE DC BEDREST. Continue to recommend ACUTE inpatient rehabilitation for the functional deficits consisting of 3 hours of therapy/day & 24 hour RN/daily PMR physician for comorbid medical management. Patient will be able to tolerate 3 hours a day.    Continue bedside therapy as well as OOB throughout the day with mobilization by staff to maintain cardiopulmonary function and prevention of secondary complications related to debility.     Discussed with rehab team.   
                            [facility]                                        Ne[facility] [place]                                 Tiarra Blair, & Yasmany                                  [address]                                        [place], [place], [address]                                             [phone]        CC: Stroke/TIA    HPI:   The patient is a 80y Male with a prior history of stroke with residual right sided weakness who presented with slurred speech and clumsiness. He feels back to his prior baseline now.   He has had several falls in the last few weeks.   Cods stroke reportedly called (although no note). He was not a t-PA candidate on arrival as last known well time  was  not clear. CT-A showed no findings to require intervention.     Interim history:  Remains in ER.     ROS:   Denies headache or dizziness.  Denies chest pain.  Denies shortness of breath.    MEDICATIONS  (STANDING):  amLODIPine   Tablet 10 milliGRAM(s) Oral daily  aspirin  chewable 81 milliGRAM(s) Oral daily  ATENolol  Tablet 50 milliGRAM(s) Oral daily  atorvastatin 80 milliGRAM(s) Oral at bedtime  dextrose 40% Gel 15 Gram(s) Oral once  dextrose 5%. 1000 milliLiter(s) (50 mL/Hr) IV Continuous <Continuous>  enoxaparin Injectable 40 milliGRAM(s) SubCutaneous daily  gabapentin 300 milliGRAM(s) Oral three times a day  glucagon  Injectable 1 milliGRAM(s) IntraMuscular once  insulin lispro (ADMELOG) corrective regimen sliding scale   SubCutaneous three times a day before meals  lisinopril 10 milliGRAM(s) Oral daily  pantoprazole    Tablet 40 milliGRAM(s) Oral before breakfast  sodium chloride 0.9%. 1000 milliLiter(s) (75 mL/Hr) IV Continuous <Continuous>      Vital Signs Last 24 Hrs  T(C): 36.7 (27 Dec 2020 07:38), Max: 36.7 (27 Dec 2020 07:38)  T(F): 98.1 (27 Dec 2020 07:38), Max: 98.1 (27 Dec 2020 07:38)  HR: 78 (27 Dec 2020 07:38) (68 - 78)  BP: 189/83 (27 Dec 2020 07:38) (113/70 - 189/83)  RR: 20 (27 Dec 2020 07:38) (19 - 20)  SpO2: 97% (27 Dec 2020 07:38) (94% - 97%)    Detailed Neurologic Exam:    Mental status: The patient is awake and alert. There is no aphasia. There is no dysarthria.     Cranial nerves: Pupils react symmetrically to light. There is no visual field deficit to confrontation. Extraocular motion is full with no nystagmus. There is no ptosis. Facial sensation is intact. Facial musculature is symmetric. Palate elevates symmetrically. Tongue is midline.    Motor: There is normal bulk and tone.  Strength is 5/5 in the right arm and leg. There is slightly decreased fine finger movement on the right.  Strength is 5/5 in the left arm and leg.    Sensation: Intact to light touch and pin. There is no extinction to double simultaneous stimulation.     Reflexes: 2+ throughout and plantar responses are flexor.    Cerebellar: There is no dysmetria on finger to nose testing.    Labs:     12-25    136  |  100  |  34.0<H>  ----------------------------<  121<H>  4.7   |  23.0  |  0.94    Ca    9.3      25 Dec 2020 12:51    TPro  7.1  /  Alb  4.1  /  TBili  0.5  /  DBili  x   /  AST  23  /  ALT  17  /  AlkPhos  59  12-25                            14.0   5.42  )-----------( 220      ( 25 Dec 2020 12:51 )             43.0         
CC: possible stroke     INTERVAL HPI/OVERNIGHT EVENTS: seen and examined , feels well , speech is improved but very unsteady , wanted to sign out AMA but his daughter convinced him for rehab   BP running high , will restart home meds   denies any complains           Vital Signs Last 24 Hrs  T(C): 36.7 (27 Dec 2020 11:39), Max: 36.7 (27 Dec 2020 07:38)  T(F): 98 (27 Dec 2020 11:39), Max: 98.1 (27 Dec 2020 07:38)  HR: 80 (27 Dec 2020 11:39) (68 - 80)  BP: 135/80 (27 Dec 2020 11:39) (113/70 - 189/83)  BP(mean): --  RR: 20 (27 Dec 2020 11:39) (19 - 20)  SpO2: 95% (27 Dec 2020 11:39) (94% - 97%)    PHYSICAL EXAM:    GENERAL: NAD, resting comfortable in bed   CHEST/LUNG: CTAB , no wheezing , rales, rhonchi heard   HEART: S1S2+, Regular rate and rhythm  ABDOMEN: Soft, Nontender, Nondistended; Bowel sounds present  EXTREMITIES:  no pitting edema , pulses palpated BL.        LABS:                        14.0   5.42  )-----------( 220      ( 25 Dec 2020 12:51 )             43.0     12-25    136  |  100  |  34.0<H>  ----------------------------<  121<H>  4.7   |  23.0  |  0.94    Ca    9.3      25 Dec 2020 12:51    TPro  7.1  /  Alb  4.1  /  TBili  0.5  /  DBili  x   /  AST  23  /  ALT  17  /  AlkPhos  59  12-25    PT/INR - ( 25 Dec 2020 12:51 )   PT: 10.8 sec;   INR: 0.93 ratio         PTT - ( 25 Dec 2020 12:51 )  PTT:32.3 sec        MEDICATIONS  (STANDING):  amLODIPine   Tablet 10 milliGRAM(s) Oral daily  aspirin  chewable 81 milliGRAM(s) Oral daily  ATENolol  Tablet 50 milliGRAM(s) Oral daily  atorvastatin 80 milliGRAM(s) Oral at bedtime  dextrose 40% Gel 15 Gram(s) Oral once  dextrose 5%. 1000 milliLiter(s) (50 mL/Hr) IV Continuous <Continuous>  dextrose 5%. 1000 milliLiter(s) (100 mL/Hr) IV Continuous <Continuous>  dextrose 50% Injectable 25 Gram(s) IV Push once  dextrose 50% Injectable 12.5 Gram(s) IV Push once  dextrose 50% Injectable 25 Gram(s) IV Push once  enoxaparin Injectable 40 milliGRAM(s) SubCutaneous daily  gabapentin 300 milliGRAM(s) Oral three times a day  glucagon  Injectable 1 milliGRAM(s) IntraMuscular once  insulin lispro (ADMELOG) corrective regimen sliding scale   SubCutaneous three times a day before meals  lisinopril 10 milliGRAM(s) Oral daily  pantoprazole    Tablet 40 milliGRAM(s) Oral before breakfast  sodium chloride 0.9%. 1000 milliLiter(s) (75 mL/Hr) IV Continuous <Continuous>    MEDICATIONS  (PRN):      RADIOLOGY & ADDITIONAL TESTS:  
CC: possible stroke     INTERVAL HPI/OVERNIGHT EVENTS: seen and examined , feels well, no complains   pending eval by acute rehab           Vital Signs Last 24 Hrs  T(C): 36.5 (28 Dec 2020 07:32), Max: 36.9 (27 Dec 2020 15:18)  T(F): 97.7 (28 Dec 2020 07:32), Max: 98.5 (27 Dec 2020 15:18)  HR: 60 (28 Dec 2020 07:32) (56 - 80)  BP: 156/74 (28 Dec 2020 07:32) (140/65 - 179/84)  BP(mean): --  RR: 18 (28 Dec 2020 07:32) (18 - 20)  SpO2: 97% (28 Dec 2020 07:32) (93% - 98%)    PHYSICAL EXAM:    GENERAL: NAD, resting comfortable in bed   CHEST/LUNG: CTAB , no wheezing , rales, rhonchi heard   HEART: S1S2+, Regular rate and rhythm  ABDOMEN: Soft, Nontender, Nondistended; Bowel sounds present  EXTREMITIES:  no pitting edema , pulses palpated BL.        LABS:                        14.4   6.72  )-----------( 249      ( 27 Dec 2020 13:32 )             41.7     12-27    134<L>  |  98  |  22.0<H>  ----------------------------<  125<H>  4.3   |  24.0  |  0.74    Ca    9.3      27 Dec 2020 14:41              MEDICATIONS  (STANDING):  amLODIPine   Tablet 10 milliGRAM(s) Oral daily  aspirin  chewable 81 milliGRAM(s) Oral daily  ATENolol  Tablet 50 milliGRAM(s) Oral daily  atorvastatin 80 milliGRAM(s) Oral at bedtime  dextrose 40% Gel 15 Gram(s) Oral once  dextrose 5%. 1000 milliLiter(s) (50 mL/Hr) IV Continuous <Continuous>  dextrose 5%. 1000 milliLiter(s) (100 mL/Hr) IV Continuous <Continuous>  dextrose 50% Injectable 25 Gram(s) IV Push once  dextrose 50% Injectable 12.5 Gram(s) IV Push once  dextrose 50% Injectable 25 Gram(s) IV Push once  enoxaparin Injectable 40 milliGRAM(s) SubCutaneous daily  gabapentin 300 milliGRAM(s) Oral three times a day  glucagon  Injectable 1 milliGRAM(s) IntraMuscular once  insulin lispro (ADMELOG) corrective regimen sliding scale   SubCutaneous three times a day before meals  lisinopril 40 milliGRAM(s) Oral daily  melatonin 5 milliGRAM(s) Oral at bedtime  pantoprazole    Tablet 40 milliGRAM(s) Oral before breakfast    MEDICATIONS  (PRN):      RADIOLOGY & ADDITIONAL TESTS:
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
## 2020-12-29 NOTE — DISCHARGE NOTE PROVIDER - NSDCMRMEDTOKEN_GEN_ALL_CORE_FT
amLODIPine 10 mg oral tablet: 1 tab(s) orally once a day  aspirin 81 mg oral tablet, chewable: 1 tab(s) orally once a day  atenolol 50 mg oral tablet: 1 tab(s) orally once a day  atorvastatin 80 mg oral tablet: 1 tab(s) orally once a day (at bedtime)  DULoxetine 20 mg oral delayed release capsule: 1 cap(s) orally 2 times a day  gabapentin 300 mg oral capsule: 1 cap(s) orally 3 times a day  lisinopril 40 mg oral tablet: 1 tab(s) orally once a day  metFORMIN 1000 mg oral tablet: 1 tab(s) orally 2 times a day  Protonix 40 mg oral delayed release tablet: 1 tab(s) orally once a day

## 2020-12-29 NOTE — H&P ADULT - ASSESSMENT
GREGORY BONILLA is a 81yo Male with   , now with decreased functional mobility, gait instability and ADL impairments.  dysphagia, aphasia, hemiplegia    COMORBIDITES/ACTIVE MEDICAL ISSUES     Gait Instability, ADL impairments and Functional impairments: start Comprehensive Rehab Program of PT/OT     #CVA  -start PT/OT/SLP  -fall and aspiration precautions  -ASA/Lipitor  -hx prior CVA    #HTN  -Lisinopril, Norvasc, Atenolol  -monitor VS closely  -hospitalist f/up    #CAD  -ASA/Lipitor  -TTE WNL, no CHF      Pain Mgmt - Tylenol PRN  GI/Bowel Mgmt - Senna,  Miralax PRN  /Bladder Mgmt - Voiding independently, PVR      Precautions / PROPHYLAXIS:   - Falls, Cardiac  - Ortho: Weight bearing status: WBAT   - Lungs: Aspiration, Incentive Spirometer   - Pressure injury/Skin: Turn Q2hrs while in bed, OOB to Chair, PT/OT    - DVT: Lovenox     MEDICAL PROGNOSIS: GOOD            REHAB POTENTIAL: GOOD             ESTIMATED DISPOSITION: HOME WITH HOME CARE            ELOS: 10-14 Days   EXPECTED THERAPY:     P.T. 1hr/day       O.T. 1hr/day      S.L.P. 1hr/day     P&O Unnecessary     EXP FREQUENCY: 5 days per 7 day period     PRESCREEN COMPARISION:   I have reviewed the prescreen information and I have found no relevant changes between the preadmission screening and my post admission evaluation     RATIONALE FOR INPATIENT ADMISSION - Patient demonstrates the following: (check all that apply)  [X] Medically appropriate for rehabilitation admission  [X] Has attainable rehab goals with an appropriate initial discharge plan  [X] Has rehabilitation potential (expected to make a significant improvement within a reasonable period of time)   [X] Requires close medical management by a rehab physician, rehab nursing care, Hospitalist and comprehensive interdisciplinary team (including PT, OT, & or SLP, Prosthetics and Orthotics)     GREGORY BONILLA is a 81yo Male with new onset slurred speech and left-sided weakness from presumed TIA vs. CVA, now with decreased functional mobility, gait instability and ADL impairments.  dysphagia, aphasia, hemiplegia    COMORBIDITES/ACTIVE MEDICAL ISSUES     Gait Instability, ADL impairments and Functional impairments: start Comprehensive Rehab Program of PT/OT     #CVA  -start PT/OT/SLP  -fall and aspiration precautions  -ASA/Lipitor  -hx prior CVA    #HTN  -Lisinopril, Norvasc, Atenolol  -monitor VS closely  -hospitalist f/up    #CAD  -ASA/Lipitor  -TTE WNL, no CHF      Pain Mgmt - Tylenol PRN  GI/Bowel Mgmt - Senna,  Miralax PRN  /Bladder Mgmt - Voiding independently, PVR      Precautions / PROPHYLAXIS:   - Falls, Cardiac  - Ortho: Weight bearing status: WBAT   - Lungs: Aspiration, Incentive Spirometer   - Pressure injury/Skin: Turn Q2hrs while in bed, OOB to Chair, PT/OT    - DVT: Lovenox     MEDICAL PROGNOSIS: GOOD            REHAB POTENTIAL: GOOD             ESTIMATED DISPOSITION: HOME WITH HOME CARE            ELOS: 10-14 Days   EXPECTED THERAPY:     P.T. 1hr/day       O.T. 1hr/day      S.L.P. 1hr/day     P&O Unnecessary     EXP FREQUENCY: 5 days per 7 day period     PRESCREEN COMPARISION:   I have reviewed the prescreen information and I have found no relevant changes between the preadmission screening and my post admission evaluation     RATIONALE FOR INPATIENT ADMISSION - Patient demonstrates the following: (check all that apply)  [X] Medically appropriate for rehabilitation admission  [X] Has attainable rehab goals with an appropriate initial discharge plan  [X] Has rehabilitation potential (expected to make a significant improvement within a reasonable period of time)   [X] Requires close medical management by a rehab physician, rehab nursing care, Hospitalist and comprehensive interdisciplinary team (including PT, OT, & or SLP, Prosthetics and Orthotics)

## 2020-12-29 NOTE — H&P ADULT - ATTENDING COMMENTS
Rehab attending- Patient seen and examined Bedside- Above history, assessment and plan reviewed by me with modifications made    No specific Co  Hx of frequent falling- Toes Right Foot getting stuck  Patient feels Speech and Motor function have improved  However his balance remains impaired  voiding- , 4cc  BM today  no dizziness, no headaches  no chest pain, no SOB                    14.2   9.48  )-----------( 252      ( 30 Dec 2020 05:50 )             42.6     12-30    139  |  102  |  19  ----------------------------<  115<H>  4.1   |  28  |  0.93    Ca    9.1      30 Dec 2020 05:50    TPro  7.1  /  Alb  3.6  /  TBili  0.5  /  DBili  x   /  AST  15  /  ALT  24  /  AlkPhos  59  12-30    Vital Signs Last 24 Hrs  T(C): 36.7 (30 Dec 2020 08:40), Max: 36.7 (29 Dec 2020 19:30)  T(F): 98 (30 Dec 2020 08:40), Max: 98 (29 Dec 2020 19:30)  HR: 52 (30 Dec 2020 08:40) (52 - 72)  BP: 105/65 (30 Dec 2020 08:40) (105/65 - 131/78)  BP(mean): --  RR: 15 (30 Dec 2020 08:40) (15 - 15)  SpO2: 94% (30 Dec 2020 08:40) (94% - 97%)    Physical exam as above- RA deformities noted Both hands  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

## 2020-12-29 NOTE — H&P ADULT - NSHPSOCIALHISTORY_GEN_ALL_CORE
SOCIAL HISTORY - as per documentation/history  Smoking - None  EtOH - None  Drugs - None    FUNCTIONAL HISTORY  Lives with spouse, 3 LAURENCE  Independent +/- SC/RW - non complaint  FUNCTIONAL PROGRESS  12/28  Bed Mobility  Bed Mobility Training Supine-to-Sit: minimum assist (75% patient effort);  1 person assist;  verbal cues  Bed Mobility Training Limitations: decreased ability to use arms for pushing/pulling;  decreased ability to use legs for bridging/pushing;  impaired ability to control trunk for mobility;  decreased strength;  impaired balance;  impaired coordination    Bed-Chair Transfer Training  Transfer Training Bed-to-Chair Transfer: 1 person assist;  verbal cues;  rolling walker;  minimum assist (75% patient effort)  Transfer Training Chair-to-Bed Transfer: 1 person assist;  minimum assist (75% patient effort);  verbal cues;  handheld   Bed-to-Chair Transfer Training Transfer Safety Analysis: decreased strength;  impaired balance;  impaired coordination;  rolling walker    Sit-Stand Transfer Training  Transfer Training Sit-to-Stand Transfer: moderate assist (50% patient effort);  1 person assist;  verbal cues;  rolling walker  Transfer Training Stand-to-Sit Transfer: minimum assist (75% patient effort);  1 person assist;  verbal cues;  rolling walker  Sit-to-Stand Transfer Training Transfer Safety Analysis: decreased strength;  impaired balance;  impaired coordination;  rolling walker    Gait Training  Gait Training: moderate assist (50% patient effort);  1 person assist;  verbal cues;  rolling walker;  40' including left and right turns  Gait Analysis: 3-point gait   decreased millie;  decreased step length;  decreased stride length;  decreased weight-shifting ability;  knee flexion throughout, decreased right heel strike;  decreased strength;  impaired balance;  impaired coordination;  40';  rolling walker

## 2020-12-29 NOTE — H&P ADULT - HISTORY OF PRESENT ILLNESS
81 yo  male with PMH of CVA( with residual R UE & LE weakness), HTN, HLD, CAD/CABG, RA , unsteady gait to ED [facility] with new onset slurred speech, and left sided weakness. Patient with multiple falls over 2 weeks. CTH neg on admission, not a candidate for TPA. TTE wnl, carotid doppler negative occlusion, unable to do MRI due to rods in back. Evaluated by PMR and discharge to acute rehab recommended.

## 2020-12-29 NOTE — DISCHARGE NOTE PROVIDER - NSDCCPCAREPLAN_GEN_ALL_CORE_FT
PRINCIPAL DISCHARGE DIAGNOSIS  Diagnosis: CVA (cerebral vascular accident)  Assessment and Plan of Treatment: continue with aspirin , lipitor , follow up with neurology      SECONDARY DISCHARGE DIAGNOSES  Diagnosis: Hypertension  Assessment and Plan of Treatment: uncontrolled , continue with new doses of medications

## 2020-12-30 LAB
ALBUMIN SERPL ELPH-MCNC: 3.6 G/DL — SIGNIFICANT CHANGE UP (ref 3.3–5)
ALP SERPL-CCNC: 59 U/L — SIGNIFICANT CHANGE UP (ref 40–120)
ALT FLD-CCNC: 24 U/L — SIGNIFICANT CHANGE UP (ref 10–45)
ANION GAP SERPL CALC-SCNC: 9 MMOL/L — SIGNIFICANT CHANGE UP (ref 5–17)
AST SERPL-CCNC: 15 U/L — SIGNIFICANT CHANGE UP (ref 10–40)
BASOPHILS # BLD AUTO: 0.03 K/UL — SIGNIFICANT CHANGE UP (ref 0–0.2)
BASOPHILS NFR BLD AUTO: 0.3 % — SIGNIFICANT CHANGE UP (ref 0–2)
BILIRUB SERPL-MCNC: 0.5 MG/DL — SIGNIFICANT CHANGE UP (ref 0.2–1.2)
BUN SERPL-MCNC: 19 MG/DL — SIGNIFICANT CHANGE UP (ref 7–23)
CALCIUM SERPL-MCNC: 9.1 MG/DL — SIGNIFICANT CHANGE UP (ref 8.4–10.5)
CHLORIDE SERPL-SCNC: 102 MMOL/L — SIGNIFICANT CHANGE UP (ref 96–108)
CO2 SERPL-SCNC: 28 MMOL/L — SIGNIFICANT CHANGE UP (ref 22–31)
CREAT SERPL-MCNC: 0.93 MG/DL — SIGNIFICANT CHANGE UP (ref 0.5–1.3)
EOSINOPHIL # BLD AUTO: 0.14 K/UL — SIGNIFICANT CHANGE UP (ref 0–0.5)
EOSINOPHIL NFR BLD AUTO: 1.5 % — SIGNIFICANT CHANGE UP (ref 0–6)
GLUCOSE SERPL-MCNC: 115 MG/DL — HIGH (ref 70–99)
HCT VFR BLD CALC: 42.6 % — SIGNIFICANT CHANGE UP (ref 39–50)
HGB BLD-MCNC: 14.2 G/DL — SIGNIFICANT CHANGE UP (ref 13–17)
IMM GRANULOCYTES NFR BLD AUTO: 0.4 % — SIGNIFICANT CHANGE UP (ref 0–1.5)
LYMPHOCYTES # BLD AUTO: 1.49 K/UL — SIGNIFICANT CHANGE UP (ref 1–3.3)
LYMPHOCYTES # BLD AUTO: 15.7 % — SIGNIFICANT CHANGE UP (ref 13–44)
MCHC RBC-ENTMCNC: 31.3 PG — SIGNIFICANT CHANGE UP (ref 27–34)
MCHC RBC-ENTMCNC: 33.3 GM/DL — SIGNIFICANT CHANGE UP (ref 32–36)
MCV RBC AUTO: 93.8 FL — SIGNIFICANT CHANGE UP (ref 80–100)
MONOCYTES # BLD AUTO: 0.79 K/UL — SIGNIFICANT CHANGE UP (ref 0–0.9)
MONOCYTES NFR BLD AUTO: 8.3 % — SIGNIFICANT CHANGE UP (ref 2–14)
NEUTROPHILS # BLD AUTO: 6.99 K/UL — SIGNIFICANT CHANGE UP (ref 1.8–7.4)
NEUTROPHILS NFR BLD AUTO: 73.8 % — SIGNIFICANT CHANGE UP (ref 43–77)
NRBC # BLD: 0 /100 WBCS — SIGNIFICANT CHANGE UP (ref 0–0)
PLATELET # BLD AUTO: 252 K/UL — SIGNIFICANT CHANGE UP (ref 150–400)
POTASSIUM SERPL-MCNC: 4.1 MMOL/L — SIGNIFICANT CHANGE UP (ref 3.5–5.3)
POTASSIUM SERPL-SCNC: 4.1 MMOL/L — SIGNIFICANT CHANGE UP (ref 3.5–5.3)
PROT SERPL-MCNC: 7.1 G/DL — SIGNIFICANT CHANGE UP (ref 6–8.3)
RBC # BLD: 4.54 M/UL — SIGNIFICANT CHANGE UP (ref 4.2–5.8)
RBC # FLD: 13 % — SIGNIFICANT CHANGE UP (ref 10.3–14.5)
SARS-COV-2 RNA SPEC QL NAA+PROBE: SIGNIFICANT CHANGE UP
SODIUM SERPL-SCNC: 139 MMOL/L — SIGNIFICANT CHANGE UP (ref 135–145)
WBC # BLD: 9.48 K/UL — SIGNIFICANT CHANGE UP (ref 3.8–10.5)
WBC # FLD AUTO: 9.48 K/UL — SIGNIFICANT CHANGE UP (ref 3.8–10.5)

## 2020-12-30 PROCEDURE — 99223 1ST HOSP IP/OBS HIGH 75: CPT

## 2020-12-30 PROCEDURE — 99223 1ST HOSP IP/OBS HIGH 75: CPT | Mod: GC

## 2020-12-30 RX ADMIN — GABAPENTIN 300 MILLIGRAM(S): 400 CAPSULE ORAL at 05:17

## 2020-12-30 RX ADMIN — Medication 81 MILLIGRAM(S): at 11:03

## 2020-12-30 RX ADMIN — PANTOPRAZOLE SODIUM 40 MILLIGRAM(S): 20 TABLET, DELAYED RELEASE ORAL at 05:17

## 2020-12-30 RX ADMIN — DULOXETINE HYDROCHLORIDE 20 MILLIGRAM(S): 30 CAPSULE, DELAYED RELEASE ORAL at 11:04

## 2020-12-30 RX ADMIN — LISINOPRIL 40 MILLIGRAM(S): 2.5 TABLET ORAL at 05:17

## 2020-12-30 RX ADMIN — ENOXAPARIN SODIUM 40 MILLIGRAM(S): 100 INJECTION SUBCUTANEOUS at 11:02

## 2020-12-30 RX ADMIN — GABAPENTIN 300 MILLIGRAM(S): 400 CAPSULE ORAL at 21:26

## 2020-12-30 RX ADMIN — METFORMIN HYDROCHLORIDE 1000 MILLIGRAM(S): 850 TABLET ORAL at 18:07

## 2020-12-30 RX ADMIN — ATENOLOL 50 MILLIGRAM(S): 25 TABLET ORAL at 05:17

## 2020-12-30 RX ADMIN — METFORMIN HYDROCHLORIDE 1000 MILLIGRAM(S): 850 TABLET ORAL at 09:11

## 2020-12-30 RX ADMIN — Medication 3 MILLIGRAM(S): at 21:26

## 2020-12-30 RX ADMIN — AMLODIPINE BESYLATE 10 MILLIGRAM(S): 2.5 TABLET ORAL at 05:17

## 2020-12-30 RX ADMIN — GABAPENTIN 300 MILLIGRAM(S): 400 CAPSULE ORAL at 13:01

## 2020-12-30 RX ADMIN — ATORVASTATIN CALCIUM 80 MILLIGRAM(S): 80 TABLET, FILM COATED ORAL at 21:26

## 2020-12-30 NOTE — PATIENT PROFILE ADULT - HOME ACCESSIBILITY CONCERNS
[FreeTextEntry1] : This is a 51 year old man with a mildly elevated HCT.  Will need to determine primary vs secondary polycythemia.  Check erythropoietin level, retic count, Gabriel 2 and BCRabl translocation.  If secondary  polycythemia, the most likely etiology appears to be history of sleep apnea with low compliance with the CPAP mask.  Patinet does not apaepr to have smoked enough or have enough lung disease for it ot be form the COPD, the timing does not seem to fit with the Axiron though this is still a possiblity. patient did have a severely depressed testosterone  and is doing much better with Axiron, it would e problematic to stop this.  \par \par Patient will follow up in 2 weeks to discuss results of testing and come up with a plan of action.\par \par Spent > 45 minutes in direct patient care and and addressed all questions and concerns.  >50% of this time was in direct face to face contact with patient during exam and counseling.  none

## 2020-12-30 NOTE — CONSULT NOTE ADULT - ASSESSMENT
79 yo  male with PMH of CVA ( with residual R UE & LE weakness), HTN, HLD, CAD/CABG, RA , unsteady gait to ED [facility] with new onset slurred speech, and left sided weakness. Patient with multiple falls over 2 weeks. CTH neg on admission, not a candidate for TPA. TTE wnl, carotid doppler negative occlusion, unable to do MRI due to rods in back.     # Acute CVA versus TIA   CT head + CTA head/neck 12/25/20: no acute changes, no hemodynamically significant stenosis/ narrowing of vessels  ECHO normal  , carotid doppler normal, unable to do MRI secondary to hardware in back   continue ASA + high dose lipitor    # Hx of dyslipidemia  on high dose statin    # Hx of HTN, controlled  continue norvasc, lisinopril and lopressor    # Hx of CAD   - on asa/ statin/ beta blocker    # Hx of DM   A1c 5.9 12/27/20  FS controlled, continue metformin + SSI    # DVT prophylaxis: lovenox

## 2020-12-30 NOTE — PATIENT PROFILE ADULT - HEALTH LITERACY
----- Message from Seema Noel sent at 6/7/2017  8:16 AM CDT -----  Contact: self  Pt needs blood work ordered and scheduled. Please call [phone]. Thanks   
Labs ordered. Please schedule and link. Thank you.   
Orders linked to appt.   
Patient requesting lab orders prior to her annual exam scheduled on 6/12/2017.    
no
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
## 2020-12-31 PROCEDURE — 99232 SBSQ HOSP IP/OBS MODERATE 35: CPT | Mod: GC

## 2020-12-31 PROCEDURE — 99232 SBSQ HOSP IP/OBS MODERATE 35: CPT

## 2020-12-31 RX ADMIN — Medication 81 MILLIGRAM(S): at 11:28

## 2020-12-31 RX ADMIN — ATORVASTATIN CALCIUM 80 MILLIGRAM(S): 80 TABLET, FILM COATED ORAL at 21:19

## 2020-12-31 RX ADMIN — GABAPENTIN 300 MILLIGRAM(S): 400 CAPSULE ORAL at 21:18

## 2020-12-31 RX ADMIN — ATENOLOL 50 MILLIGRAM(S): 25 TABLET ORAL at 06:15

## 2020-12-31 RX ADMIN — ENOXAPARIN SODIUM 40 MILLIGRAM(S): 100 INJECTION SUBCUTANEOUS at 11:28

## 2020-12-31 RX ADMIN — METFORMIN HYDROCHLORIDE 1000 MILLIGRAM(S): 850 TABLET ORAL at 07:51

## 2020-12-31 RX ADMIN — METFORMIN HYDROCHLORIDE 1000 MILLIGRAM(S): 850 TABLET ORAL at 16:36

## 2020-12-31 RX ADMIN — GABAPENTIN 300 MILLIGRAM(S): 400 CAPSULE ORAL at 13:32

## 2020-12-31 RX ADMIN — GABAPENTIN 300 MILLIGRAM(S): 400 CAPSULE ORAL at 06:15

## 2020-12-31 RX ADMIN — LISINOPRIL 40 MILLIGRAM(S): 2.5 TABLET ORAL at 06:15

## 2020-12-31 RX ADMIN — PANTOPRAZOLE SODIUM 40 MILLIGRAM(S): 20 TABLET, DELAYED RELEASE ORAL at 06:15

## 2020-12-31 RX ADMIN — AMLODIPINE BESYLATE 10 MILLIGRAM(S): 2.5 TABLET ORAL at 06:15

## 2020-12-31 RX ADMIN — DULOXETINE HYDROCHLORIDE 20 MILLIGRAM(S): 30 CAPSULE, DELAYED RELEASE ORAL at 11:28

## 2020-12-31 RX ADMIN — Medication 3 MILLIGRAM(S): at 21:18

## 2020-12-31 NOTE — PROGRESS NOTE ADULT - SUBJECTIVE AND OBJECTIVE BOX
Patient is a 80y old Male who presents with a chief complaint of s/p CVA.    No overnight events noted.  Patient without new/acute symptoms reported.  Patient seen and examined at bedside.    ALLERGIES:  No Known Allergies    MEDICATIONS  (STANDING):  amLODIPine   Tablet 10 milliGRAM(s) Oral daily  aspirin  chewable 81 milliGRAM(s) Oral daily  ATENolol  Tablet 50 milliGRAM(s) Oral daily  atorvastatin 80 milliGRAM(s) Oral at bedtime  DULoxetine 20 milliGRAM(s) Oral daily  enoxaparin Injectable 40 milliGRAM(s) SubCutaneous daily  gabapentin 300 milliGRAM(s) Oral three times a day  lisinopril 40 milliGRAM(s) Oral daily  melatonin 3 milliGRAM(s) Oral at bedtime  metFORMIN 1000 milliGRAM(s) Oral two times a day  pantoprazole    Tablet 40 milliGRAM(s) Oral before breakfast    MEDICATIONS  (PRN):  senna 2 Tablet(s) Oral at bedtime PRN Constipation    Vital Signs Last 24 Hrs  T(F): 97.3 (31 Dec 2020 07:49), Max: 97.4 (30 Dec 2020 20:35)  HR: 55 (31 Dec 2020 07:49) (55 - 62)  BP: 113/67 (31 Dec 2020 07:49) (113/67 - 158/78)  RR: 15 (31 Dec 2020 07:49) (15 - 15)  SpO2: 96% (31 Dec 2020 07:49) (93% - 96%)    PHYSICAL EXAM:  GENERAL: NAD, well-groomed, well-developed  HEAD:  Atraumatic, Normocephalic  NECK: Supple, No JVD, Normal thyroid  NERVOUS SYSTEM:  Alert & Oriented X3, Good concentration; Motor Strength 5/5 B/L upper and lower extremities  CHEST/LUNG: Clear to percussion bilaterally; No rales, rhonchi, wheezing, or rubs  HEART: Regular rate and rhythm; No murmurs, rubs, or gallops  ABDOMEN: Soft, Nontender, Nondistended; Bowel sounds present  EXTREMITIES:  2+ Peripheral Pulses, No clubbing, cyanosis, or edema  LYMPH: No lymphadenopathy noted  SKIN: No rashes or lesions    LABS:                        14.2   9.48  )-----------( 252      ( 30 Dec 2020 05:50 )             42.6       12-30    139  |  102  |  19  ----------------------------<  115  4.1   |  28  |  0.93    Ca    9.1      30 Dec 2020 05:50    TPro  7.1  /  Alb  3.6  /  TBili  0.5  /  DBili  x   /  AST  15  /  ALT  24  /  AlkPhos  59  12-30     eGFR if Non African American: 78 mL/min/1.73M2 (12-30-20 @ 05:50)  eGFR if African American: 90 mL/min/1.73M2 (12-30-20 @ 05:50)    12-27 Chol 125 mg/dL LDL -- HDL 39 mg/dL Trig 198 mg/dL

## 2020-12-31 NOTE — PROGRESS NOTE ADULT - ASSESSMENT
79 yo  male with PMH of CVA ( with residual R UE & LE weakness), HTN, HLD, CAD/CABG, RA , unsteady gait to ED [facility] with new onset slurred speech, and left sided weakness. Patient with multiple falls over 2 weeks. CTH neg on admission, not a candidate for TPA. TTE wnl, carotid doppler negative occlusion, unable to do MRI due to rods in back.     # Acute CVA versus TIA   CT head + CTA head/neck 12/25/20: no acute changes, no hemodynamically significant stenosis/ narrowing of vessels  ECHO normal , carotid doppler normal, unable to do MRI secondary to hardware in back   continue ASA + high dose lipitor    # Hx of dyslipidemia  on high dose statin    # Hx of HTN, controlled  continue norvasc, lisinopril and lopressor    # Hx of CAD   - on asa/ statin/ beta blocker    # Hx of DM   A1c 5.9 12/27/20  FS controlled, continue metformin + SSI    # DVT prophylaxis: lovenox

## 2020-12-31 NOTE — PROGRESS NOTE ADULT - ASSESSMENT
GREGORY BONILLA is a 79yo Male with new onset slurred speech and left-sided weakness from presumed TIA vs. CVA, now with decreased functional mobility, gait instability and ADL impairments.  dysphagia, aphasia, hemiplegia    COMORBIDITES/ACTIVE MEDICAL ISSUES     Gait Instability, ADL impairments and Functional impairments: start Comprehensive Rehab Program of PT/OT     #CVA  -start PT/OT/SLP  -fall and aspiration precautions  -ASA/Lipitor  -hx prior CVA  Decrease Speech Therapy  30 min/ Increase Physical Therapy Time  1 1/2 hrs Daily      #HTN  -Lisinopril, Norvasc, Atenolol  -monitor VS closely      #CAD  -ASA/Lipitor  -TTE WNL, no CHF      Pain Mgmt - Tylenol PRN  GI/Bowel Mgmt - Senna,  Miralax PRN  /Bladder Mgmt - Voiding independently, PVR    DM2  glucoses well controlled on Metformin    Precautions / PROPHYLAXIS:   - Falls, Cardiac  - Ortho: Weight bearing status: WBAT   - Lungs: Aspiration, Incentive Spirometer   - Pressure injury/Skin: Turn Q2hrs while in bed, OOB to Chair, PT/OT    - DVT: Lovenox
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
## 2021-01-01 PROCEDURE — 99232 SBSQ HOSP IP/OBS MODERATE 35: CPT

## 2021-01-01 RX ADMIN — ATENOLOL 50 MILLIGRAM(S): 25 TABLET ORAL at 06:05

## 2021-01-01 RX ADMIN — Medication 3 MILLIGRAM(S): at 21:08

## 2021-01-01 RX ADMIN — METFORMIN HYDROCHLORIDE 1000 MILLIGRAM(S): 850 TABLET ORAL at 08:10

## 2021-01-01 RX ADMIN — PANTOPRAZOLE SODIUM 40 MILLIGRAM(S): 20 TABLET, DELAYED RELEASE ORAL at 06:05

## 2021-01-01 RX ADMIN — GABAPENTIN 300 MILLIGRAM(S): 400 CAPSULE ORAL at 12:13

## 2021-01-01 RX ADMIN — AMLODIPINE BESYLATE 10 MILLIGRAM(S): 2.5 TABLET ORAL at 06:05

## 2021-01-01 RX ADMIN — GABAPENTIN 300 MILLIGRAM(S): 400 CAPSULE ORAL at 06:05

## 2021-01-01 RX ADMIN — ATORVASTATIN CALCIUM 80 MILLIGRAM(S): 80 TABLET, FILM COATED ORAL at 21:09

## 2021-01-01 RX ADMIN — GABAPENTIN 300 MILLIGRAM(S): 400 CAPSULE ORAL at 21:09

## 2021-01-01 RX ADMIN — ENOXAPARIN SODIUM 40 MILLIGRAM(S): 100 INJECTION SUBCUTANEOUS at 12:12

## 2021-01-01 RX ADMIN — METFORMIN HYDROCHLORIDE 1000 MILLIGRAM(S): 850 TABLET ORAL at 17:40

## 2021-01-01 RX ADMIN — LISINOPRIL 40 MILLIGRAM(S): 2.5 TABLET ORAL at 06:05

## 2021-01-01 RX ADMIN — DULOXETINE HYDROCHLORIDE 20 MILLIGRAM(S): 30 CAPSULE, DELAYED RELEASE ORAL at 12:12

## 2021-01-01 RX ADMIN — Medication 81 MILLIGRAM(S): at 12:12

## 2021-01-01 NOTE — PROGRESS NOTE ADULT - SUBJECTIVE AND OBJECTIVE BOX
Cc: Gait dysfunction    HPI: Patient with no new medical issues today.  Pain controlled, no chest pain, no N/V, no Fevers/Chills. No other new ROS  Has been tolerating rehabilitation program.    amLODIPine   Tablet 10 milliGRAM(s) Oral daily  aspirin  chewable 81 milliGRAM(s) Oral daily  ATENolol  Tablet 50 milliGRAM(s) Oral daily  atorvastatin 80 milliGRAM(s) Oral at bedtime  DULoxetine 20 milliGRAM(s) Oral daily  enoxaparin Injectable 40 milliGRAM(s) SubCutaneous daily  gabapentin 300 milliGRAM(s) Oral three times a day  lisinopril 40 milliGRAM(s) Oral daily  melatonin 3 milliGRAM(s) Oral at bedtime  metFORMIN 1000 milliGRAM(s) Oral two times a day  pantoprazole    Tablet 40 milliGRAM(s) Oral before breakfast  senna 2 Tablet(s) Oral at bedtime PRN      T(C): 36.3 (01-01-21 @ 08:08), Max: 36.3 (01-01-21 @ 08:08)  HR: 55 (01-01-21 @ 08:08) (55 - 61)  BP: 110/63 (01-01-21 @ 08:08) (110/63 - 157/75)  RR: 15 (01-01-21 @ 08:08) (15 - 15)  SpO2: 94% (01-01-21 @ 08:08) (94% - 94%)    In NAD  HEENT- EOMI  Heart-   S1S2  Lungs- no respiratory distress  Abd- + BS, NT  Ext- No calf pain  Neuro- oriented x 3     Imp: Patient with diagnosis of CVA admitted for comprehensive acute rehabilitation.    Plan:  - Continue therapies  - DVT prophylaxis- lovenox  - Skin- Turn q2h, check skin daily  - Continue current medications; patient medically stable.   - Patient is stable to continue current rehabilitation program.   DNR

## 2021-01-02 ENCOUNTER — RX RENEWAL (OUTPATIENT)
Age: 81
End: 2021-01-02

## 2021-01-02 PROCEDURE — 99232 SBSQ HOSP IP/OBS MODERATE 35: CPT

## 2021-01-02 RX ADMIN — GABAPENTIN 300 MILLIGRAM(S): 400 CAPSULE ORAL at 14:31

## 2021-01-02 RX ADMIN — Medication 81 MILLIGRAM(S): at 11:41

## 2021-01-02 RX ADMIN — ATORVASTATIN CALCIUM 80 MILLIGRAM(S): 80 TABLET, FILM COATED ORAL at 21:10

## 2021-01-02 RX ADMIN — METFORMIN HYDROCHLORIDE 1000 MILLIGRAM(S): 850 TABLET ORAL at 18:10

## 2021-01-02 RX ADMIN — GABAPENTIN 300 MILLIGRAM(S): 400 CAPSULE ORAL at 06:00

## 2021-01-02 RX ADMIN — METFORMIN HYDROCHLORIDE 1000 MILLIGRAM(S): 850 TABLET ORAL at 08:23

## 2021-01-02 RX ADMIN — DULOXETINE HYDROCHLORIDE 20 MILLIGRAM(S): 30 CAPSULE, DELAYED RELEASE ORAL at 11:41

## 2021-01-02 RX ADMIN — LISINOPRIL 40 MILLIGRAM(S): 2.5 TABLET ORAL at 06:00

## 2021-01-02 RX ADMIN — ATENOLOL 50 MILLIGRAM(S): 25 TABLET ORAL at 06:00

## 2021-01-02 RX ADMIN — GABAPENTIN 300 MILLIGRAM(S): 400 CAPSULE ORAL at 21:10

## 2021-01-02 RX ADMIN — ENOXAPARIN SODIUM 40 MILLIGRAM(S): 100 INJECTION SUBCUTANEOUS at 11:41

## 2021-01-02 RX ADMIN — PANTOPRAZOLE SODIUM 40 MILLIGRAM(S): 20 TABLET, DELAYED RELEASE ORAL at 06:01

## 2021-01-02 RX ADMIN — Medication 3 MILLIGRAM(S): at 21:10

## 2021-01-02 RX ADMIN — AMLODIPINE BESYLATE 10 MILLIGRAM(S): 2.5 TABLET ORAL at 06:00
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
## 2021-01-02 NOTE — PROGRESS NOTE ADULT - ASSESSMENT
CVA  ASA/Statin  PT/OT per rehab    HTN  Atenolol, Norvasc, Lisinopril    Prediabetes  Metformin    DVT ppx  Lovenox

## 2021-01-03 PROCEDURE — 99232 SBSQ HOSP IP/OBS MODERATE 35: CPT

## 2021-01-03 RX ADMIN — DULOXETINE HYDROCHLORIDE 20 MILLIGRAM(S): 30 CAPSULE, DELAYED RELEASE ORAL at 12:37

## 2021-01-03 RX ADMIN — Medication 81 MILLIGRAM(S): at 12:37

## 2021-01-03 RX ADMIN — ENOXAPARIN SODIUM 40 MILLIGRAM(S): 100 INJECTION SUBCUTANEOUS at 12:37

## 2021-01-03 RX ADMIN — PANTOPRAZOLE SODIUM 40 MILLIGRAM(S): 20 TABLET, DELAYED RELEASE ORAL at 06:11

## 2021-01-03 RX ADMIN — AMLODIPINE BESYLATE 10 MILLIGRAM(S): 2.5 TABLET ORAL at 06:10

## 2021-01-03 RX ADMIN — GABAPENTIN 300 MILLIGRAM(S): 400 CAPSULE ORAL at 13:22

## 2021-01-03 RX ADMIN — GABAPENTIN 300 MILLIGRAM(S): 400 CAPSULE ORAL at 06:10

## 2021-01-03 RX ADMIN — ATORVASTATIN CALCIUM 80 MILLIGRAM(S): 80 TABLET, FILM COATED ORAL at 21:03

## 2021-01-03 RX ADMIN — METFORMIN HYDROCHLORIDE 1000 MILLIGRAM(S): 850 TABLET ORAL at 17:20

## 2021-01-03 RX ADMIN — Medication 3 MILLIGRAM(S): at 21:03

## 2021-01-03 RX ADMIN — GABAPENTIN 300 MILLIGRAM(S): 400 CAPSULE ORAL at 21:03

## 2021-01-03 RX ADMIN — METFORMIN HYDROCHLORIDE 1000 MILLIGRAM(S): 850 TABLET ORAL at 08:06

## 2021-01-03 RX ADMIN — ATENOLOL 50 MILLIGRAM(S): 25 TABLET ORAL at 06:10

## 2021-01-03 RX ADMIN — LISINOPRIL 40 MILLIGRAM(S): 2.5 TABLET ORAL at 06:10

## 2021-01-03 NOTE — DIETITIAN INITIAL EVALUATION ADULT. - PERTINENT MEDS FT
aspirin, Lovenox, metformin, Norvasc, Tenormin, Lipitor, Cymbalta, Neurontin, Zestril, melatonin, Protonix

## 2021-01-03 NOTE — PROGRESS NOTE ADULT - SUBJECTIVE AND OBJECTIVE BOX
Cc: Gait dysfunction    HPI: Patient with no new medical issues today.  Pain controlled, no chest pain, no N/V, no Fevers/Chills. No other new ROS  Has been tolerating rehabilitation program.    amLODIPine   Tablet 10 milliGRAM(s) Oral daily  aspirin  chewable 81 milliGRAM(s) Oral daily  ATENolol  Tablet 50 milliGRAM(s) Oral daily  atorvastatin 80 milliGRAM(s) Oral at bedtime  DULoxetine 20 milliGRAM(s) Oral daily  enoxaparin Injectable 40 milliGRAM(s) SubCutaneous daily  gabapentin 300 milliGRAM(s) Oral three times a day  lisinopril 40 milliGRAM(s) Oral daily  melatonin 3 milliGRAM(s) Oral at bedtime  metFORMIN 1000 milliGRAM(s) Oral two times a day  pantoprazole    Tablet 40 milliGRAM(s) Oral before breakfast  senna 2 Tablet(s) Oral at bedtime PRN      T(C): 36.4 (01-03-21 @ 08:15), Max: 36.4 (01-02-21 @ 20:22)  HR: 63 (01-03-21 @ 08:15) (60 - 63)  BP: 113/74 (01-03-21 @ 08:15) (111/69 - 114/68)  RR: 16 (01-03-21 @ 08:15) (16 - 16)  SpO2: 97% (01-03-21 @ 08:15) (93% - 97%)       In NAD  HEENT- EOMI  Heart-   S1S2  Lungs- no respiratory distress  Abd-  NTND  Ext- No calf pain  Neuro- oriented x 3     Imp: Patient with diagnosis of CVA admitted for comprehensive acute rehabilitation.    Plan:  - Continue therapies  - DVT prophylaxis- lovenox  - Skin- Turn q2h, check skin daily  - Continue current medications; patient medically stable. bp controlled  - Patient is stable to continue current rehabilitation program.   DNR

## 2021-01-03 NOTE — DIETITIAN INITIAL EVALUATION ADULT. - OTHER INFO
79 yo  male with PMH of CVA( with residual R UE & LE weakness), HTN, HLD, CAD/CABG, RA , unsteady gait to ED [facility] with new onset slurred speech, and left sided weakness. Patient with multiple falls over 2 weeks. CTH neg on admission, not a candidate for TPA. TTE wnl, carotid doppler negative occlusion, unable to do MRI due to rods in back. Evaluated by PMR and discharge to acute rehab recommended. Pt tolerating DASH/TLC diet, consuming % of meals per nursing flow sheets. Pt reports good appetite. Obtained food preferences. Explained DASH/TLC diet to pt. Pt reports UBW of ~190 lbs, weight on 1-3-21 was 191.1 lbs; however weight on 12-29-20 was 169.9 lbs. Recommend obtaining weight at least weekly to assess weight trend. Pt declined oral nutrition supplement. Per nursing flow sheets, skin intact, no edema. Last BM 12/31.

## 2021-01-04 LAB
ALBUMIN SERPL ELPH-MCNC: 3.8 G/DL — SIGNIFICANT CHANGE UP (ref 3.3–5)
ALP SERPL-CCNC: 61 U/L — SIGNIFICANT CHANGE UP (ref 40–120)
ALT FLD-CCNC: 43 U/L — SIGNIFICANT CHANGE UP (ref 10–45)
ANION GAP SERPL CALC-SCNC: 6 MMOL/L — SIGNIFICANT CHANGE UP (ref 5–17)
AST SERPL-CCNC: 32 U/L — SIGNIFICANT CHANGE UP (ref 10–40)
BILIRUB SERPL-MCNC: 0.3 MG/DL — SIGNIFICANT CHANGE UP (ref 0.2–1.2)
BUN SERPL-MCNC: 27 MG/DL — HIGH (ref 7–23)
CALCIUM SERPL-MCNC: 9.5 MG/DL — SIGNIFICANT CHANGE UP (ref 8.4–10.5)
CHLORIDE SERPL-SCNC: 97 MMOL/L — SIGNIFICANT CHANGE UP (ref 96–108)
CO2 SERPL-SCNC: 31 MMOL/L — SIGNIFICANT CHANGE UP (ref 22–31)
CREAT SERPL-MCNC: 0.98 MG/DL — SIGNIFICANT CHANGE UP (ref 0.5–1.3)
GLUCOSE SERPL-MCNC: 118 MG/DL — HIGH (ref 70–99)
HCT VFR BLD CALC: 43.7 % — SIGNIFICANT CHANGE UP (ref 39–50)
HGB BLD-MCNC: 14.8 G/DL — SIGNIFICANT CHANGE UP (ref 13–17)
MCHC RBC-ENTMCNC: 32 PG — SIGNIFICANT CHANGE UP (ref 27–34)
MCHC RBC-ENTMCNC: 33.9 GM/DL — SIGNIFICANT CHANGE UP (ref 32–36)
MCV RBC AUTO: 94.4 FL — SIGNIFICANT CHANGE UP (ref 80–100)
NRBC # BLD: 0 /100 WBCS — SIGNIFICANT CHANGE UP (ref 0–0)
PLATELET # BLD AUTO: 292 K/UL — SIGNIFICANT CHANGE UP (ref 150–400)
POTASSIUM SERPL-MCNC: 4.2 MMOL/L — SIGNIFICANT CHANGE UP (ref 3.5–5.3)
POTASSIUM SERPL-SCNC: 4.2 MMOL/L — SIGNIFICANT CHANGE UP (ref 3.5–5.3)
PROT SERPL-MCNC: 7.6 G/DL — SIGNIFICANT CHANGE UP (ref 6–8.3)
RBC # BLD: 4.63 M/UL — SIGNIFICANT CHANGE UP (ref 4.2–5.8)
RBC # FLD: 12.7 % — SIGNIFICANT CHANGE UP (ref 10.3–14.5)
SODIUM SERPL-SCNC: 134 MMOL/L — LOW (ref 135–145)
WBC # BLD: 8.72 K/UL — SIGNIFICANT CHANGE UP (ref 3.8–10.5)
WBC # FLD AUTO: 8.72 K/UL — SIGNIFICANT CHANGE UP (ref 3.8–10.5)

## 2021-01-04 PROCEDURE — 99233 SBSQ HOSP IP/OBS HIGH 50: CPT

## 2021-01-04 PROCEDURE — 99232 SBSQ HOSP IP/OBS MODERATE 35: CPT

## 2021-01-04 RX ORDER — LISINOPRIL 2.5 MG/1
40 TABLET ORAL DAILY
Refills: 0 | Status: DISCONTINUED | OUTPATIENT
Start: 2021-01-04 | End: 2021-01-06

## 2021-01-04 RX ORDER — ATENOLOL 25 MG/1
50 TABLET ORAL DAILY
Refills: 0 | Status: DISCONTINUED | OUTPATIENT
Start: 2021-01-04 | End: 2021-01-08

## 2021-01-04 RX ORDER — AMLODIPINE BESYLATE 2.5 MG/1
5 TABLET ORAL DAILY
Refills: 0 | Status: DISCONTINUED | OUTPATIENT
Start: 2021-01-05 | End: 2021-01-08

## 2021-01-04 RX ADMIN — GABAPENTIN 300 MILLIGRAM(S): 400 CAPSULE ORAL at 14:34

## 2021-01-04 RX ADMIN — Medication 3 MILLIGRAM(S): at 21:53

## 2021-01-04 RX ADMIN — LISINOPRIL 40 MILLIGRAM(S): 2.5 TABLET ORAL at 05:56

## 2021-01-04 RX ADMIN — ENOXAPARIN SODIUM 40 MILLIGRAM(S): 100 INJECTION SUBCUTANEOUS at 11:32

## 2021-01-04 RX ADMIN — ATORVASTATIN CALCIUM 80 MILLIGRAM(S): 80 TABLET, FILM COATED ORAL at 21:53

## 2021-01-04 RX ADMIN — DULOXETINE HYDROCHLORIDE 20 MILLIGRAM(S): 30 CAPSULE, DELAYED RELEASE ORAL at 11:32

## 2021-01-04 RX ADMIN — ATENOLOL 50 MILLIGRAM(S): 25 TABLET ORAL at 05:56

## 2021-01-04 RX ADMIN — GABAPENTIN 300 MILLIGRAM(S): 400 CAPSULE ORAL at 21:53

## 2021-01-04 RX ADMIN — METFORMIN HYDROCHLORIDE 1000 MILLIGRAM(S): 850 TABLET ORAL at 18:01

## 2021-01-04 RX ADMIN — AMLODIPINE BESYLATE 10 MILLIGRAM(S): 2.5 TABLET ORAL at 05:56

## 2021-01-04 RX ADMIN — METFORMIN HYDROCHLORIDE 1000 MILLIGRAM(S): 850 TABLET ORAL at 08:07

## 2021-01-04 RX ADMIN — GABAPENTIN 300 MILLIGRAM(S): 400 CAPSULE ORAL at 05:56

## 2021-01-04 RX ADMIN — PANTOPRAZOLE SODIUM 40 MILLIGRAM(S): 20 TABLET, DELAYED RELEASE ORAL at 05:56

## 2021-01-04 RX ADMIN — Medication 81 MILLIGRAM(S): at 11:32
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
## 2021-01-04 NOTE — PROGRESS NOTE ADULT - ASSESSMENT
GREGORY BONILLA is a 79yo Male with new onset slurred speech and left-sided weakness from presumed TIA vs. CVA, now with decreased functional mobility, gait instability and ADL impairments.  dysphagia, aphasia, hemiplegia    COMORBIDITES/ACTIVE MEDICAL ISSUES     Gait Instability, ADL impairments and Functional impairments:  Comprehensive Rehab Program of PT/OT     #CVA  -continue PT/OT/SLP  -fall and aspiration precautions  -ASA/Lipitor  -hx prior CVA  Decrease Speech Therapy  30 min/ Increase Physical Therapy Time  1 1/2 hrs Daily      #HTN  -Lisinopril, Norvasc, Atenolol  -monitor VS closely-BPs 100s from 140s after am BP regimen, monitor on Norvasc 5mg now      #CAD  -ASA/Lipitor  -TTE WNL, no CHF      Pain Mgmt - Tylenol PRN  GI/Bowel Mgmt - Senna,  Miralax PRN  /Bladder Mgmt - Voiding independently, PVR low    DM2  glucoses well controlled on Metformin    Precautions / PROPHYLAXIS:   - Falls, Cardiac  - Ortho: Weight bearing status: WBAT   - Lungs: Aspiration, Incentive Spirometer   - Pressure injury/Skin: Turn Q2hrs while in bed, OOB to Chair, PT/OT    - DVT: Lovenox
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
## 2021-01-05 PROCEDURE — 99232 SBSQ HOSP IP/OBS MODERATE 35: CPT

## 2021-01-05 RX ORDER — TRAMADOL HYDROCHLORIDE 50 MG/1
25 TABLET ORAL
Refills: 0 | Status: DISCONTINUED | OUTPATIENT
Start: 2021-01-05 | End: 2021-01-08

## 2021-01-05 RX ORDER — TRAMADOL HYDROCHLORIDE 50 MG/1
25 TABLET ORAL EVERY 4 HOURS
Refills: 0 | Status: DISCONTINUED | OUTPATIENT
Start: 2021-01-05 | End: 2021-01-06

## 2021-01-05 RX ADMIN — ATORVASTATIN CALCIUM 80 MILLIGRAM(S): 80 TABLET, FILM COATED ORAL at 21:11

## 2021-01-05 RX ADMIN — LISINOPRIL 40 MILLIGRAM(S): 2.5 TABLET ORAL at 06:13

## 2021-01-05 RX ADMIN — METFORMIN HYDROCHLORIDE 1000 MILLIGRAM(S): 850 TABLET ORAL at 17:40

## 2021-01-05 RX ADMIN — DULOXETINE HYDROCHLORIDE 20 MILLIGRAM(S): 30 CAPSULE, DELAYED RELEASE ORAL at 12:20

## 2021-01-05 RX ADMIN — ENOXAPARIN SODIUM 40 MILLIGRAM(S): 100 INJECTION SUBCUTANEOUS at 12:20

## 2021-01-05 RX ADMIN — METFORMIN HYDROCHLORIDE 1000 MILLIGRAM(S): 850 TABLET ORAL at 08:51

## 2021-01-05 RX ADMIN — GABAPENTIN 300 MILLIGRAM(S): 400 CAPSULE ORAL at 21:11

## 2021-01-05 RX ADMIN — Medication 3 MILLIGRAM(S): at 21:11

## 2021-01-05 RX ADMIN — GABAPENTIN 300 MILLIGRAM(S): 400 CAPSULE ORAL at 06:13

## 2021-01-05 RX ADMIN — ATENOLOL 50 MILLIGRAM(S): 25 TABLET ORAL at 06:13

## 2021-01-05 RX ADMIN — AMLODIPINE BESYLATE 5 MILLIGRAM(S): 2.5 TABLET ORAL at 06:13

## 2021-01-05 RX ADMIN — PANTOPRAZOLE SODIUM 40 MILLIGRAM(S): 20 TABLET, DELAYED RELEASE ORAL at 06:13

## 2021-01-05 RX ADMIN — Medication 81 MILLIGRAM(S): at 12:20

## 2021-01-05 RX ADMIN — GABAPENTIN 300 MILLIGRAM(S): 400 CAPSULE ORAL at 15:05
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
## 2021-01-05 NOTE — PROGRESS NOTE ADULT - ASSESSMENT
CVA  ASA/Statin  PT/OT per rehab    HTN  Atenolol 50, Norvasc(dec'd to 5), Lisinopril 40  At home pt was on Atenolol 50/Norvasc 5/Lisinopril 10    Prediabetes  Metformin    Mild hypernatremia, intermittent  Monitor for now    DVT ppx  Lovenox

## 2021-01-05 NOTE — PROGRESS NOTE ADULT - ASSESSMENT
GREGORY BONILLA is a 81yo Male with new onset slurred speech and left-sided weakness from presumed TIA vs. CVA, now with decreased functional mobility, gait instability and ADL impairments.  dysphagia, aphasia, hemiplegia    COMORBIDITES/ACTIVE MEDICAL ISSUES     Gait Instability, ADL impairments and Functional impairments:  Comprehensive Rehab Program of PT/OT     #CVA  -continue PT/OT/SLP  -fall and aspiration precautions  -ASA/Lipitor  -hx prior CVA  -Decrease Speech Therapy  30 min/ Increase Physical Therapy Time  1 1/2 hrs Daily      #HTN  -Lisinopril, Norvasc, Atenolol  -monitor VS closely-BPs 100s on 1/4.  Monitor on Norvasc 5mg now      #CAD  -ASA/Lipitor  -TTE WNL, no CHF      Pain Mgmt - Tylenol PRN  GI/Bowel Mgmt - Senna,  Miralax PRN  /Bladder Mgmt - Voiding independently, PVR low    DM2  glucoses well controlled on Metformin    Precautions / PROPHYLAXIS:   - Falls, Cardiac  - Ortho: Weight bearing status: WBAT   - Lungs: Aspiration, Incentive Spirometer   - Pressure injury/Skin: Turn Q2hrs while in bed, OOB to Chair, PT/OT    - DVT: Lovenox  GREGORY BONILLA is a 81yo Male with new onset slurred speech and left-sided weakness from presumed TIA vs. CVA, now with decreased functional mobility, gait instability and ADL impairments.  dysphagia, aphasia, hemiplegia    COMORBIDITES/ACTIVE MEDICAL ISSUES     Gait Instability, ADL impairments and Functional impairments: Comprehensive Rehab Program of PT/OT     #CVA  -continue PT/OT/SLP.   -fall and aspiration precautions  -ASA/Lipitor  -hx prior CVA  -Decrease Speech Therapy  30 min/ Increase Physical Therapy Time  1 1/2 hrs Daily      #HTN  -Lisinopril, Norvasc, Atenolol  -monitor VS closely-BPs 100s on 1/4.  Monitor on Norvasc 5mg now      #CAD  -ASA/Lipitor  -TTE WNL, no CHF    #neuropathy  -vs arthritis on Neurontin  -Tylenol prn    Pain Mgmt - Tylenol PRN  GI/Bowel Mgmt - Senna,  Miralax PRN  /Bladder Mgmt - Voiding independently, PVR low    #DM2  glucoses well controlled on Metformin    Precautions / PROPHYLAXIS:   - Falls, Cardiac  - Ortho: Weight bearing status: WBAT   - Lungs: Aspiration, Incentive Spirometer   - Pressure injury/Skin: Turn Q2hrs while in bed, OOB to Chair, PT/OT    - DVT: Lovenox

## 2021-01-06 LAB — SARS-COV-2 RNA SPEC QL NAA+PROBE: SIGNIFICANT CHANGE UP

## 2021-01-06 PROCEDURE — 99232 SBSQ HOSP IP/OBS MODERATE 35: CPT

## 2021-01-06 RX ORDER — ACETAMINOPHEN 500 MG
650 TABLET ORAL EVERY 8 HOURS
Refills: 0 | Status: DISCONTINUED | OUTPATIENT
Start: 2021-01-06 | End: 2021-01-08

## 2021-01-06 RX ORDER — LISINOPRIL 2.5 MG/1
20 TABLET ORAL DAILY
Refills: 0 | Status: DISCONTINUED | OUTPATIENT
Start: 2021-01-06 | End: 2021-01-08

## 2021-01-06 RX ORDER — CYCLOBENZAPRINE HYDROCHLORIDE 10 MG/1
5 TABLET, FILM COATED ORAL ONCE
Refills: 0 | Status: COMPLETED | OUTPATIENT
Start: 2021-01-06 | End: 2021-01-06

## 2021-01-06 RX ORDER — LIDOCAINE 4 G/100G
2 CREAM TOPICAL EVERY 24 HOURS
Refills: 0 | Status: DISCONTINUED | OUTPATIENT
Start: 2021-01-06 | End: 2021-01-08

## 2021-01-06 RX ORDER — MELOXICAM 15 MG/1
7.5 TABLET ORAL ONCE
Refills: 0 | Status: DISCONTINUED | OUTPATIENT
Start: 2021-01-06 | End: 2021-01-06

## 2021-01-06 RX ADMIN — ATORVASTATIN CALCIUM 80 MILLIGRAM(S): 80 TABLET, FILM COATED ORAL at 21:05

## 2021-01-06 RX ADMIN — AMLODIPINE BESYLATE 5 MILLIGRAM(S): 2.5 TABLET ORAL at 06:14

## 2021-01-06 RX ADMIN — DULOXETINE HYDROCHLORIDE 20 MILLIGRAM(S): 30 CAPSULE, DELAYED RELEASE ORAL at 11:23

## 2021-01-06 RX ADMIN — METFORMIN HYDROCHLORIDE 1000 MILLIGRAM(S): 850 TABLET ORAL at 08:08

## 2021-01-06 RX ADMIN — ATENOLOL 50 MILLIGRAM(S): 25 TABLET ORAL at 06:13

## 2021-01-06 RX ADMIN — GABAPENTIN 300 MILLIGRAM(S): 400 CAPSULE ORAL at 13:30

## 2021-01-06 RX ADMIN — Medication 81 MILLIGRAM(S): at 11:23

## 2021-01-06 RX ADMIN — GABAPENTIN 300 MILLIGRAM(S): 400 CAPSULE ORAL at 21:05

## 2021-01-06 RX ADMIN — LISINOPRIL 40 MILLIGRAM(S): 2.5 TABLET ORAL at 06:13

## 2021-01-06 RX ADMIN — METFORMIN HYDROCHLORIDE 1000 MILLIGRAM(S): 850 TABLET ORAL at 17:39

## 2021-01-06 RX ADMIN — Medication 3 MILLIGRAM(S): at 21:05

## 2021-01-06 RX ADMIN — PANTOPRAZOLE SODIUM 40 MILLIGRAM(S): 20 TABLET, DELAYED RELEASE ORAL at 06:13

## 2021-01-06 RX ADMIN — LIDOCAINE 2 PATCH: 4 CREAM TOPICAL at 17:40

## 2021-01-06 RX ADMIN — ENOXAPARIN SODIUM 40 MILLIGRAM(S): 100 INJECTION SUBCUTANEOUS at 11:23

## 2021-01-06 RX ADMIN — Medication 650 MILLIGRAM(S): at 13:27

## 2021-01-06 RX ADMIN — CYCLOBENZAPRINE HYDROCHLORIDE 5 MILLIGRAM(S): 10 TABLET, FILM COATED ORAL at 11:23

## 2021-01-06 RX ADMIN — LIDOCAINE 2 PATCH: 4 CREAM TOPICAL at 19:55

## 2021-01-06 RX ADMIN — GABAPENTIN 300 MILLIGRAM(S): 400 CAPSULE ORAL at 06:13

## 2021-01-06 RX ADMIN — Medication 650 MILLIGRAM(S): at 21:05

## 2021-01-06 NOTE — PROGRESS NOTE ADULT - ASSESSMENT
CVA  ASA/Statin  PT/OT per rehab    HTN  Atenolol 50, Norvasc(dec'd to 5), Lisinopril(dec to 20 as )  At home pt was on Atenolol 50/Norvasc 5/Lisinopril 10    Prediabetes  Metformin    Mild hypernatremia, intermittent  Monitor for now    back/joint pain with h/o RA, fibromyalgia, Sjogrens  pt was taking piroxicam as outpt, hesitant to start NSAIDS given CV risk.  Will try APAP.   Flexeril ordered by primary team.   Outpt Rheum note reviewed(Dr. Myron Kleiner)-pt was apparently rec to be off of Piroxicam but pt declined.   I have reached out to Dr. Kleiner re: Rec     DVT ppx  Lovenox
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
## 2021-01-06 NOTE — PROGRESS NOTE ADULT - ASSESSMENT
GREGORY BONILLA is a 81yo Male with new onset slurred speech and left-sided weakness from presumed TIA vs. CVA, now with decreased functional mobility, gait instability and ADL impairments.  dysphagia, aphasia, hemiplegia    COMORBIDITES/ACTIVE MEDICAL ISSUES     Gait Instability, ADL impairments and Functional impairments: Comprehensive Rehab Program of PT/OT     #CVA  -continue PT/OT/SLP.   -fall and aspiration precautions  -ASA/Lipitor  -hx prior CVA  -Decrease Speech Therapy  30 min/ Increase Physical Therapy Time  1 1/2 hrs Daily      #HTN  -Lisinopril, Norvasc, Atenolol  -monitor VS closely-BPs 110s on Norvasc 5mg now, tolerating therapy well.      #CAD  -ASA/Lipitor  -TTE WNL, no CHF    #Pain  - on Neurontin  -Tylenol prn, Flexeril added  -right hand arthritic pain-will follow hospitalist recommendation    Pain Mgmt - Tylenol PRN  GI/Bowel Mgmt - Senna,  Miralax PRN  /Bladder Mgmt - Voiding independently, PVR low    #DM2  -glucoses well controlled on Metformin    Precautions / PROPHYLAXIS:   - Falls, Cardiac  - Ortho: Weight bearing status: WBAT   - Lungs: Aspiration, Incentive Spirometer   - Pressure injury/Skin: Turn Q2hrs while in bed, OOB to Chair, PT/OT    - DVT: Lovenox

## 2021-01-07 ENCOUNTER — TRANSCRIPTION ENCOUNTER (OUTPATIENT)
Age: 81
End: 2021-01-07

## 2021-01-07 LAB
ALBUMIN SERPL ELPH-MCNC: 3.6 G/DL — SIGNIFICANT CHANGE UP (ref 3.3–5)
ALP SERPL-CCNC: 60 U/L — SIGNIFICANT CHANGE UP (ref 40–120)
ALT FLD-CCNC: 30 U/L — SIGNIFICANT CHANGE UP (ref 10–45)
ANION GAP SERPL CALC-SCNC: 11 MMOL/L — SIGNIFICANT CHANGE UP (ref 5–17)
AST SERPL-CCNC: 21 U/L — SIGNIFICANT CHANGE UP (ref 10–40)
BILIRUB SERPL-MCNC: 0.4 MG/DL — SIGNIFICANT CHANGE UP (ref 0.2–1.2)
BUN SERPL-MCNC: 33 MG/DL — HIGH (ref 7–23)
CALCIUM SERPL-MCNC: 9.7 MG/DL — SIGNIFICANT CHANGE UP (ref 8.4–10.5)
CHLORIDE SERPL-SCNC: 95 MMOL/L — LOW (ref 96–108)
CO2 SERPL-SCNC: 28 MMOL/L — SIGNIFICANT CHANGE UP (ref 22–31)
CREAT SERPL-MCNC: 0.81 MG/DL — SIGNIFICANT CHANGE UP (ref 0.5–1.3)
GLUCOSE SERPL-MCNC: 116 MG/DL — HIGH (ref 70–99)
HCT VFR BLD CALC: 41.1 % — SIGNIFICANT CHANGE UP (ref 39–50)
HGB BLD-MCNC: 14 G/DL — SIGNIFICANT CHANGE UP (ref 13–17)
MCHC RBC-ENTMCNC: 31.4 PG — SIGNIFICANT CHANGE UP (ref 27–34)
MCHC RBC-ENTMCNC: 34.1 GM/DL — SIGNIFICANT CHANGE UP (ref 32–36)
MCV RBC AUTO: 92.2 FL — SIGNIFICANT CHANGE UP (ref 80–100)
NRBC # BLD: 0 /100 WBCS — SIGNIFICANT CHANGE UP (ref 0–0)
PLATELET # BLD AUTO: 270 K/UL — SIGNIFICANT CHANGE UP (ref 150–400)
POTASSIUM SERPL-MCNC: 4.5 MMOL/L — SIGNIFICANT CHANGE UP (ref 3.5–5.3)
POTASSIUM SERPL-SCNC: 4.5 MMOL/L — SIGNIFICANT CHANGE UP (ref 3.5–5.3)
PROT SERPL-MCNC: 7.5 G/DL — SIGNIFICANT CHANGE UP (ref 6–8.3)
RBC # BLD: 4.46 M/UL — SIGNIFICANT CHANGE UP (ref 4.2–5.8)
RBC # FLD: 12.9 % — SIGNIFICANT CHANGE UP (ref 10.3–14.5)
SODIUM SERPL-SCNC: 134 MMOL/L — LOW (ref 135–145)
WBC # BLD: 8.67 K/UL — SIGNIFICANT CHANGE UP (ref 3.8–10.5)
WBC # FLD AUTO: 8.67 K/UL — SIGNIFICANT CHANGE UP (ref 3.8–10.5)

## 2021-01-07 PROCEDURE — 99232 SBSQ HOSP IP/OBS MODERATE 35: CPT

## 2021-01-07 RX ORDER — ATENOLOL 25 MG/1
1 TABLET ORAL
Qty: 0 | Refills: 0 | DISCHARGE
Start: 2021-01-07

## 2021-01-07 RX ORDER — AMLODIPINE BESYLATE 2.5 MG/1
1 TABLET ORAL
Qty: 0 | Refills: 0 | DISCHARGE
Start: 2021-01-07

## 2021-01-07 RX ORDER — LANOLIN ALCOHOL/MO/W.PET/CERES
1 CREAM (GRAM) TOPICAL
Qty: 0 | Refills: 0 | DISCHARGE
Start: 2021-01-07

## 2021-01-07 RX ORDER — DULOXETINE HYDROCHLORIDE 30 MG/1
1 CAPSULE, DELAYED RELEASE ORAL
Qty: 0 | Refills: 0 | DISCHARGE
Start: 2021-01-07

## 2021-01-07 RX ORDER — METFORMIN HYDROCHLORIDE 850 MG/1
1 TABLET ORAL
Qty: 0 | Refills: 0 | DISCHARGE

## 2021-01-07 RX ORDER — ACETAMINOPHEN 500 MG
2 TABLET ORAL
Qty: 0 | Refills: 0 | DISCHARGE
Start: 2021-01-07

## 2021-01-07 RX ORDER — PANTOPRAZOLE SODIUM 20 MG/1
1 TABLET, DELAYED RELEASE ORAL
Qty: 0 | Refills: 0 | DISCHARGE

## 2021-01-07 RX ORDER — DULOXETINE HYDROCHLORIDE 30 MG/1
1 CAPSULE, DELAYED RELEASE ORAL
Qty: 0 | Refills: 0 | DISCHARGE

## 2021-01-07 RX ORDER — GABAPENTIN 400 MG/1
1 CAPSULE ORAL
Qty: 0 | Refills: 0 | DISCHARGE
Start: 2021-01-07

## 2021-01-07 RX ORDER — PANTOPRAZOLE SODIUM 20 MG/1
1 TABLET, DELAYED RELEASE ORAL
Qty: 0 | Refills: 0 | DISCHARGE
Start: 2021-01-07

## 2021-01-07 RX ORDER — ATORVASTATIN CALCIUM 80 MG/1
1 TABLET, FILM COATED ORAL
Qty: 0 | Refills: 0 | DISCHARGE
Start: 2021-01-07

## 2021-01-07 RX ORDER — ASPIRIN/CALCIUM CARB/MAGNESIUM 324 MG
1 TABLET ORAL
Qty: 0 | Refills: 0 | DISCHARGE

## 2021-01-07 RX ORDER — GABAPENTIN 400 MG/1
1 CAPSULE ORAL
Qty: 0 | Refills: 0 | DISCHARGE

## 2021-01-07 RX ORDER — LISINOPRIL 2.5 MG/1
1 TABLET ORAL
Qty: 0 | Refills: 0 | DISCHARGE
Start: 2021-01-07

## 2021-01-07 RX ORDER — SENNA PLUS 8.6 MG/1
2 TABLET ORAL
Qty: 0 | Refills: 0 | DISCHARGE
Start: 2021-01-07

## 2021-01-07 RX ORDER — METFORMIN HYDROCHLORIDE 850 MG/1
1 TABLET ORAL
Qty: 0 | Refills: 0 | DISCHARGE
Start: 2021-01-07

## 2021-01-07 RX ORDER — TRAMADOL HYDROCHLORIDE 50 MG/1
0.5 TABLET ORAL
Qty: 0 | Refills: 0 | DISCHARGE
Start: 2021-01-07

## 2021-01-07 RX ORDER — ASPIRIN/CALCIUM CARB/MAGNESIUM 324 MG
1 TABLET ORAL
Qty: 0 | Refills: 0 | DISCHARGE
Start: 2021-01-07

## 2021-01-07 RX ORDER — ATENOLOL 25 MG/1
1 TABLET ORAL
Qty: 0 | Refills: 0 | DISCHARGE

## 2021-01-07 RX ADMIN — Medication 650 MILLIGRAM(S): at 21:25

## 2021-01-07 RX ADMIN — LIDOCAINE 2 PATCH: 4 CREAM TOPICAL at 05:51

## 2021-01-07 RX ADMIN — METFORMIN HYDROCHLORIDE 1000 MILLIGRAM(S): 850 TABLET ORAL at 07:40

## 2021-01-07 RX ADMIN — ATORVASTATIN CALCIUM 80 MILLIGRAM(S): 80 TABLET, FILM COATED ORAL at 21:25

## 2021-01-07 RX ADMIN — AMLODIPINE BESYLATE 5 MILLIGRAM(S): 2.5 TABLET ORAL at 05:50

## 2021-01-07 RX ADMIN — ENOXAPARIN SODIUM 40 MILLIGRAM(S): 100 INJECTION SUBCUTANEOUS at 11:22

## 2021-01-07 RX ADMIN — Medication 3 MILLIGRAM(S): at 21:25

## 2021-01-07 RX ADMIN — LISINOPRIL 20 MILLIGRAM(S): 2.5 TABLET ORAL at 05:50

## 2021-01-07 RX ADMIN — TRAMADOL HYDROCHLORIDE 25 MILLIGRAM(S): 50 TABLET ORAL at 05:49

## 2021-01-07 RX ADMIN — Medication 650 MILLIGRAM(S): at 14:06

## 2021-01-07 RX ADMIN — Medication 81 MILLIGRAM(S): at 11:22

## 2021-01-07 RX ADMIN — PANTOPRAZOLE SODIUM 40 MILLIGRAM(S): 20 TABLET, DELAYED RELEASE ORAL at 05:50

## 2021-01-07 RX ADMIN — LIDOCAINE 2 PATCH: 4 CREAM TOPICAL at 19:46

## 2021-01-07 RX ADMIN — METFORMIN HYDROCHLORIDE 1000 MILLIGRAM(S): 850 TABLET ORAL at 17:24

## 2021-01-07 RX ADMIN — GABAPENTIN 300 MILLIGRAM(S): 400 CAPSULE ORAL at 14:06

## 2021-01-07 RX ADMIN — GABAPENTIN 300 MILLIGRAM(S): 400 CAPSULE ORAL at 05:50

## 2021-01-07 RX ADMIN — DULOXETINE HYDROCHLORIDE 20 MILLIGRAM(S): 30 CAPSULE, DELAYED RELEASE ORAL at 11:22

## 2021-01-07 RX ADMIN — GABAPENTIN 300 MILLIGRAM(S): 400 CAPSULE ORAL at 21:25

## 2021-01-07 RX ADMIN — ATENOLOL 50 MILLIGRAM(S): 25 TABLET ORAL at 05:50

## 2021-01-07 RX ADMIN — Medication 650 MILLIGRAM(S): at 05:50

## 2021-01-07 RX ADMIN — LIDOCAINE 2 PATCH: 4 CREAM TOPICAL at 17:24
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
## 2021-01-07 NOTE — DISCHARGE NOTE PROVIDER - NSDCCPCAREPLAN_GEN_ALL_CORE_FT
PRINCIPAL DISCHARGE DIAGNOSIS  Diagnosis: CVA (cerebrovascular accident)  Assessment and Plan of Treatment:       SECONDARY DISCHARGE DIAGNOSES  Diagnosis: H/O arthritis  Assessment and Plan of Treatment:     Diagnosis: DM2 (diabetes mellitus, type 2)  Assessment and Plan of Treatment:     Diagnosis: HTN (hypertension)  Assessment and Plan of Treatment:      PRINCIPAL DISCHARGE DIAGNOSIS  Diagnosis: CVA (cerebrovascular accident)  Assessment and Plan of Treatment: Continue Aspirin and Lipitor. BP regimen continues. Follow up Neurology.      SECONDARY DISCHARGE DIAGNOSES  Diagnosis: H/O arthritis  Assessment and Plan of Treatment: Neurontin and Tylenol continues. Avoid NSAIDs such as Motrin/Ibuprofen/Piroxicam. Follow up your Rheumatologist in 1 week. May take Tramadol once a day if Tylenol not effective and pain severe.    Diagnosis: DM2 (diabetes mellitus, type 2)  Assessment and Plan of Treatment: Metformin, diabetic diet, PCP follow up.    Diagnosis: HTN (hypertension)  Assessment and Plan of Treatment: BP regimen in place, check BP daily, hold if BP<110. Follow up PCP and your Cardiologist.

## 2021-01-07 NOTE — DISCHARGE NOTE PROVIDER - HOSPITAL COURSE
79 yo  male with PMH of CVA( with residual R UE & LE weakness), HTN, HLD, CAD/CABG, RA , unsteady gait to ED [facility] with new onset slurred speech, and left sided weakness. Patient with multiple falls over 2 weeks. CTH neg on admission, not a candidate for TPA. TTE wnl, carotid doppler negative occlusion, unable to do MRI due to rods in back. Evaluated by PMR and discharge to acute rehab recommended.      81 yo  male with PMH of CVA( with residual R UE & LE weakness), HTN, HLD, CAD/CABG, RA , unsteady gait to ED [facility] with new onset slurred speech, and left sided weakness. Patient with multiple falls over 2 weeks. CTH neg on admission, not a candidate for TPA. TTE wnl, carotid doppler negative occlusion, unable to do MRI due to rods in back. Evaluated by PMR and discharge to acute rehab recommended.   At [facility] rehab patient completed comprehensive PT OT SLP program and performs activities with CG/supervision. Able to ambulate over 100ft. While at rehab patient evaluated by medicine. BP regimen adjusted for normotension. Metformin restarted with consistent carbs diet. Tramadol/Tylenol for arthritic pain. Cleared for dc home with  on 1/8.

## 2021-01-07 NOTE — DISCHARGE NOTE PROVIDER - PROVIDER TOKENS
PROVIDER:[TOKEN:[[id number]]],PROVIDER:[TOKEN:[[id number]]] PROVIDER:[TOKEN:[[id number]]],PROVIDER:[TOKEN:[[id number]]],PROVIDER:[TOKEN:[[id number]]]

## 2021-01-07 NOTE — PROGRESS NOTE ADULT - ASSESSMENT
CVA  ASA/Statin  PT/OT per rehab    HTN  Atenolol 50, Norvasc 5, Lisinopril 20  At home pt was on Atenolol 50/Norvasc 5/Lisinopril 10    Prediabetes  Metformin    Mild hypernatremia, intermittent  stable at 134  Monitor for now    back/joint pain with h/o RA, fibromyalgia, Sjogrens  cont ATC APAP and prn tramadol  AVOID NSAIDS if possible    DVT ppx  Lovenox
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
## 2021-01-07 NOTE — DISCHARGE NOTE PROVIDER - CARE PROVIDERS DIRECT ADDRESSES
,[email or web address],[email or web address] ,[email or web address]

## 2021-01-07 NOTE — DISCHARGE NOTE PROVIDER - NSDCMRMEDTOKEN_GEN_ALL_CORE_FT
acetaminophen 325 mg oral tablet: 2 tab(s) orally every 8 hours  amLODIPine 5 mg oral tablet: 1 tab(s) orally once a day  aspirin 81 mg oral tablet, chewable: 1 tab(s) orally once a day  atenolol 50 mg oral tablet: 1 tab(s) orally once a day  atorvastatin 80 mg oral tablet: 1 tab(s) orally once a day (at bedtime)  DULoxetine 20 mg oral delayed release capsule: 1 cap(s) orally once a day  gabapentin 300 mg oral capsule: 1 cap(s) orally 3 times a day  lisinopril 20 mg oral tablet: 1 tab(s) orally once a day  melatonin 3 mg oral tablet: 1 tab(s) orally once a day (at bedtime)  metFORMIN 1000 mg oral tablet: 1 tab(s) orally 2 times a day  pantoprazole 40 mg oral delayed release tablet: 1 tab(s) orally once a day (before a meal)  senna oral tablet: 2 tab(s) orally once a day (at bedtime), As needed, Constipation  traMADol 50 mg oral tablet: 0.5 tab(s) orally 2 times a day, As needed, Severe Pain (7 - 10)   acetaminophen 325 mg oral tablet: 2 tab(s) orally every 8 hours  amLODIPine 5 mg oral tablet: 1 tab(s) orally once a day  aspirin 81 mg oral tablet, chewable: 1 tab(s) orally once a day  aspirin 81 mg oral tablet, chewable: 1 tab(s) orally once a day MDD:1 tab  atenolol 50 mg oral tablet: 1 tab(s) orally once a day  atorvastatin 80 mg oral tablet: 1 tab(s) orally once a day (at bedtime)  DULoxetine 20 mg oral delayed release capsule: 1 cap(s) orally once a day  DULoxetine 20 mg oral delayed release capsule: 1 cap(s) orally once a day MDD:1 cap  gabapentin 300 mg oral capsule: 1 cap(s) orally 3 times a day MDD:3 tabs  gabapentin 300 mg oral capsule: 1 cap(s) orally 3 times a day  lisinopril 20 mg oral tablet: 1 tab(s) orally once a day  melatonin 3 mg oral tablet: 1 tab(s) orally once a day (at bedtime)  metFORMIN 1000 mg oral tablet: 1 tab(s) orally 2 times a day  pantoprazole 40 mg oral delayed release tablet: 1 tab(s) orally once a day (before a meal)  senna oral tablet: 2 tab(s) orally once a day (at bedtime), As needed, Constipation  traMADol 50 mg oral tablet: 0.5 tab(s) orally once a day, As Needed -Severe Pain (7 - 10) MDD:1 tab  traMADol 50 mg oral tablet: 0.5 tab(s) orally 2 times a day, As needed, Severe Pain (7 - 10)   acetaminophen 325 mg oral tablet: 2 tab(s) orally every 8 hours  amLODIPine 5 mg oral tablet: 1 tab(s) orally once a day  aspirin 81 mg oral tablet, chewable: 1 tab(s) orally once a day MDD:1 tab  atenolol 50 mg oral tablet: 1 tab(s) orally once a day  atorvastatin 80 mg oral tablet: 1 tab(s) orally once a day (at bedtime)  DULoxetine 20 mg oral delayed release capsule: 1 cap(s) orally once a day MDD:1 cap  gabapentin 300 mg oral capsule: 1 cap(s) orally 3 times a day MDD:3 tabs  lisinopril 20 mg oral tablet: 1 tab(s) orally once a day  melatonin 3 mg oral tablet: 1 tab(s) orally once a day (at bedtime)  metFORMIN 1000 mg oral tablet: 1 tab(s) orally 2 times a day  pantoprazole 40 mg oral delayed release tablet: 1 tab(s) orally once a day (before a meal)  senna oral tablet: 2 tab(s) orally once a day (at bedtime), As needed, Constipation  traMADol 50 mg oral tablet: 0.5 tab(s) orally once a day, As Needed -Severe Pain (7 - 10) MDD:1 tab

## 2021-01-07 NOTE — DISCHARGE NOTE PROVIDER - NSDCACTIVITY_GEN_ALL_CORE
Sex allowed/Do not drive or operate machinery/Showering allowed/Do not make important decisions/Stairs allowed/Walking - Indoors allowed/No heavy lifting/straining/Walking - Outdoors allowed

## 2021-01-07 NOTE — DISCHARGE NOTE PROVIDER - CARE PROVIDER_API CALL
Robert Duran  NEUROLOGY  [address], [address]  [place] [address]  Phone: [phone]  Fax: [phone]  Follow Up Time:     Jarrod Wills  Internal Medicine  [address], [address]  [place] [address]  Phone: [phone]  Fax: [phone]  Follow Up Time:    Robert Duran  NEUROLOGY  [address], [address]  [place] [address]  Phone: [phone]  Fax: [phone]  Follow Up Time:     Jarrod Wills  Internal Medicine  [address], [address]  [place] [address]  Phone: [phone]  Fax: [phone]  Follow Up Time:     Bethany Chandler  BRAIN INJURY MEDICINE  [address] [place] [address]  Phone: [phone]  Fax: [phone]  Follow Up Time:

## 2021-01-07 NOTE — PROGRESS NOTE ADULT - ASSESSMENT
GREGORY BONILLA is a 81yo Male with new onset slurred speech and left-sided weakness from presumed TIA vs. CVA, now with decreased functional mobility, gait instability and ADL impairments.  dysphagia, aphasia, hemiplegia    COMORBIDITES/ACTIVE MEDICAL ISSUES     Gait Instability, ADL impairments and Functional impairments: Comprehensive Rehab Program of PT/OT/SLP.      #CVA  -continue PT/OT/SLP.    -fall and aspiration precautions  -ASA/Lipitor  -hx prior CVA  -Decrease Speech Therapy  30 min/ Increase Physical Therapy Time 1 1/2 hrs Daily.       #HTN  -Lisinopril, Norvasc, Atenolol as per hospitalist   -monitor VS closely      #CAD  -ASA/Lipitor.   -TTE WNL, no CHF.       #Pain  -on Neurontin  -Tylenol prn, Flexeril added, pain under good control after Tramadol 25mg.   -avoid NSAIDs for hx CAD/CABG      Pain Mgmt - Tylenol PRN  GI/Bowel Mgmt - Senna,  Miralax PRN  /Bladder Mgmt - Voiding independently, PVR low    #DM2  -glucoses well controlled on Metformin    Precautions / PROPHYLAXIS:   - Falls, Cardiac  - Ortho: Weight bearing status: WBAT   - Lungs: Aspiration, Incentive Spirometer   - Pressure injury/Skin: Turn Q2hrs while in bed, OOB to Chair, PT/OT    - DVT: Lovenox

## 2021-01-08 ENCOUNTER — TRANSCRIPTION ENCOUNTER (OUTPATIENT)
Age: 81
End: 2021-01-08

## 2021-01-08 VITALS
RESPIRATION RATE: 14 BRPM | OXYGEN SATURATION: 96 % | TEMPERATURE: 98 F | SYSTOLIC BLOOD PRESSURE: 132 MMHG | DIASTOLIC BLOOD PRESSURE: 62 MMHG | HEART RATE: 59 BPM

## 2021-01-08 PROCEDURE — 99232 SBSQ HOSP IP/OBS MODERATE 35: CPT

## 2021-01-08 PROCEDURE — 99239 HOSP IP/OBS DSCHRG MGMT >30: CPT

## 2021-01-08 RX ORDER — METFORMIN HYDROCHLORIDE 850 MG/1
1 TABLET ORAL
Qty: 60 | Refills: 0
Start: 2021-01-08 | End: 2021-02-06

## 2021-01-08 RX ORDER — DULOXETINE HYDROCHLORIDE 30 MG/1
1 CAPSULE, DELAYED RELEASE ORAL
Qty: 30 | Refills: 0
Start: 2021-01-08 | End: 2021-02-06

## 2021-01-08 RX ORDER — AMLODIPINE BESYLATE 2.5 MG/1
1 TABLET ORAL
Qty: 30 | Refills: 0
Start: 2021-01-08 | End: 2021-02-06

## 2021-01-08 RX ORDER — TRAMADOL HYDROCHLORIDE 50 MG/1
0.5 TABLET ORAL
Qty: 5 | Refills: 0
Start: 2021-01-08 | End: 2021-01-17

## 2021-01-08 RX ORDER — ASPIRIN/CALCIUM CARB/MAGNESIUM 324 MG
1 TABLET ORAL
Qty: 30 | Refills: 0
Start: 2021-01-08 | End: 2021-02-06

## 2021-01-08 RX ORDER — GABAPENTIN 400 MG/1
1 CAPSULE ORAL
Qty: 90 | Refills: 0
Start: 2021-01-08 | End: 2021-02-06

## 2021-01-08 RX ORDER — ATENOLOL 25 MG/1
1 TABLET ORAL
Qty: 30 | Refills: 0
Start: 2021-01-08 | End: 2021-02-06

## 2021-01-08 RX ORDER — PANTOPRAZOLE SODIUM 20 MG/1
1 TABLET, DELAYED RELEASE ORAL
Qty: 30 | Refills: 0
Start: 2021-01-08 | End: 2021-02-06

## 2021-01-08 RX ORDER — LISINOPRIL 2.5 MG/1
1 TABLET ORAL
Qty: 30 | Refills: 0
Start: 2021-01-08 | End: 2021-02-06

## 2021-01-08 RX ORDER — ATORVASTATIN CALCIUM 80 MG/1
1 TABLET, FILM COATED ORAL
Qty: 30 | Refills: 0
Start: 2021-01-08 | End: 2021-02-06

## 2021-01-08 RX ADMIN — GABAPENTIN 300 MILLIGRAM(S): 400 CAPSULE ORAL at 05:30

## 2021-01-08 RX ADMIN — GABAPENTIN 300 MILLIGRAM(S): 400 CAPSULE ORAL at 13:16

## 2021-01-08 RX ADMIN — PANTOPRAZOLE SODIUM 40 MILLIGRAM(S): 20 TABLET, DELAYED RELEASE ORAL at 05:30

## 2021-01-08 RX ADMIN — Medication 650 MILLIGRAM(S): at 05:30

## 2021-01-08 RX ADMIN — LIDOCAINE 2 PATCH: 4 CREAM TOPICAL at 05:33

## 2021-01-08 RX ADMIN — DULOXETINE HYDROCHLORIDE 20 MILLIGRAM(S): 30 CAPSULE, DELAYED RELEASE ORAL at 11:57

## 2021-01-08 RX ADMIN — Medication 650 MILLIGRAM(S): at 13:17

## 2021-01-08 RX ADMIN — Medication 81 MILLIGRAM(S): at 11:57

## 2021-01-08 RX ADMIN — ENOXAPARIN SODIUM 40 MILLIGRAM(S): 100 INJECTION SUBCUTANEOUS at 11:57

## 2021-01-08 RX ADMIN — AMLODIPINE BESYLATE 5 MILLIGRAM(S): 2.5 TABLET ORAL at 05:30

## 2021-01-08 RX ADMIN — METFORMIN HYDROCHLORIDE 1000 MILLIGRAM(S): 850 TABLET ORAL at 08:01
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
## 2021-01-08 NOTE — PROGRESS NOTE ADULT - PROVIDER SPECIALTY LIST ADULT
Hospitalist
Neurology
Physiatry
Rehab Medicine
Rehab Medicine
Hospitalist
Internal Medicine
Neurology
Neurology
Hospitalist
Hospitalist
Neurology
Neurology

## 2021-01-08 NOTE — DISCHARGE NOTE NURSING/CASE MANAGEMENT/SOCIAL WORK - PATIENT PORTAL LINK FT
You can access the FollowMyHealth Patient Portal offered by [facility] by registering at the following website: [email or web address]. By joining [facility]’s FollowMyHealth portal, you will also be able to view your health information using other applications (apps) compatible with our system.
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
## 2021-01-08 NOTE — DISCHARGE NOTE NURSING/CASE MANAGEMENT/SOCIAL WORK - NSDCFUADDAPPT_GEN_ALL_CORE_FT
Primary-Telecall appointment with Dr. Jarrod Wills () on Monday 1/11/21- Please expect call from Dr. Wills anytime after 9:00AM  During call, in-person appointment will be made    Outpatient rehab- [facility] rehabilitation [address] [place] [address] () on Wednesday 1/13/21 at the following times:  6PM for Occupational Therapy  7PM for Physical Therapy SW made the following appointments:    Primary-Telecall appointment with Dr. Jarrod Wills () on Monday 1/11/21- Please expect call from Dr. Wills anytime after 9:00AM  During call, in-person appointment will be made    Outpatient rehab- [address] [address] () on Wednesday 1/13/21 at the following times:  6PM for Occupational Therapy  7PM for Physical Therapy

## 2021-01-08 NOTE — PROGRESS NOTE ADULT - ASSESSMENT
GREGORY BONILLA is a 79yo Male with new onset slurred speech and left-sided weakness from presumed TIA vs. CVA, now with decreased functional mobility, gait instability and ADL impairments.  dysphagia, aphasia, hemiplegia    COMORBIDITES/ACTIVE MEDICAL ISSUES     Gait Instability, ADL impairments and Functional impairments: Completed Comprehensive Rehab Program of PT/OT/SLP.      #CVA  -completed PT/OT/SLP.  Home today with home care.   -ASA/Lipitor  -hx prior CVA  -outpt follow up with Neurology      #HTN  -Lisinopril, Norvasc, Atenolol   -monitor VS at home  -PCP follow up      #CAD  -ASA/Lipitor.   -TTE WNL, no CHF.       #Pain  -on Neurontin  -Tylenol prn, Flexeril added, pain under good control after Tramadol 25mg.   -avoid NSAIDs for hx CAD/CABG  -Rheum follow up in community    #DM2  -glucoses well controlled on Metformin

## 2021-01-08 NOTE — PROGRESS NOTE ADULT - ASSESSMENT
CVA  ASA/Statin  PT/OT per rehab    HTN  Rec dc home on Atenolol 50, Norvasc 5, Lisinopril 20  At home pt was on Atenolol 50/Norvasc 5/Lisinopril 10    Prediabetes  Metformin    Mild hypernatremia, intermittent  stable at 134  Monitor for now    back/joint pain with h/o RA, fibromyalgia, Sjogrens  cont ATC APAP and prn tramadol  AVOID NSAIDS if possible    DVT ppx  Lovenox       CVA  ASA/Statin  PT/OT per rehab    HTN  Rec dc home on Atenolol 50, Norvasc 5, Lisinopril 20. if home SBP <100 then Dec lisinopril to 10mg.   At home pt was on Atenolol 50/Norvasc 5/Lisinopril 10    Prediabetes  Metformin    Mild hypernatremia, intermittent  stable at 134  Monitor for now    back/joint pain with h/o RA, fibromyalgia, Sjogrens  cont ATC APAP and prn tramadol  AVOID NSAIDS if possible    DVT ppx  Lovenox    Pt stable for dc from medicine point of view

## 2021-01-08 NOTE — PROGRESS NOTE ADULT - REASON FOR ADMISSION
1.4 s/p CVA, no paresis
s/p CVA, no paresis
1.4 s/p CVA, no paresis

## 2021-01-08 NOTE — PROGRESS NOTE ADULT - ATTENDING COMMENTS
Arthritic pain and LBP improved on current medication regimen  Neurologically stable, functionally improving  Anticipating discharge home in the morning
Arthritis management discussed with Medicine, will follow recommendations  Neurologically stable , participates in therapy   Discharge plan is in progress
No complaints, good spirits, ready and cleared for discharge home.  Patient is being discharged home with home care today. Family ( wife) training today.   Discharge instructions were discussed with patient, all current medications were sent to the pharmacy. Patient was educated on importance of medication compliance,  continued  care with PMD and follow-up care with the specialists in the community. Safety and fall risk precautions  were discussed in detail, counseled on healthy life style modifications.  All questions were answered to their satisfaction.
Patient examined, medical records reviewed, case discussed with team and Medicine.  Continue BP, DM and HLD management.  ASA/ Lipitor for stroke ppx  Multidisciplinary team meeting today:  patient's functional goals and needs, functional and clinical  progress were discussed, barriers to discharge were identified. Anticipate discharge home with home care, 24/7 supervision for safety.    EDOD 1/8/21
Reports reactivation of arthritic pain in the small joints on both hands today ( chronic issue, being followed by rheumatology as outpatient)   Will ask Medicine to comment  Stable neurological exam  Continue ASA/ Lipitor for stroke ppx

## 2021-01-29 PROCEDURE — 97163 PT EVAL HIGH COMPLEX 45 MIN: CPT

## 2021-01-29 PROCEDURE — 92610 EVALUATE SWALLOWING FUNCTION: CPT

## 2021-01-29 PROCEDURE — 80053 COMPREHEN METABOLIC PANEL: CPT

## 2021-01-29 PROCEDURE — 97112 NEUROMUSCULAR REEDUCATION: CPT

## 2021-01-29 PROCEDURE — 97530 THERAPEUTIC ACTIVITIES: CPT

## 2021-01-29 PROCEDURE — 92507 TX SP LANG VOICE COMM INDIV: CPT

## 2021-01-29 PROCEDURE — 97535 SELF CARE MNGMENT TRAINING: CPT

## 2021-01-29 PROCEDURE — 97110 THERAPEUTIC EXERCISES: CPT

## 2021-01-29 PROCEDURE — U0005: CPT

## 2021-01-29 PROCEDURE — 92523 SPEECH SOUND LANG COMPREHEN: CPT

## 2021-01-29 PROCEDURE — 87635 SARS-COV-2 COVID-19 AMP PRB: CPT

## 2021-01-29 PROCEDURE — U0003: CPT

## 2021-01-29 PROCEDURE — 85025 COMPLETE CBC W/AUTO DIFF WBC: CPT

## 2021-01-29 PROCEDURE — 97167 OT EVAL HIGH COMPLEX 60 MIN: CPT

## 2021-01-29 PROCEDURE — 97116 GAIT TRAINING THERAPY: CPT

## 2021-01-29 PROCEDURE — 36415 COLL VENOUS BLD VENIPUNCTURE: CPT

## 2021-01-29 PROCEDURE — 85027 COMPLETE CBC AUTOMATED: CPT

## 2021-02-01 ENCOUNTER — RX RENEWAL (OUTPATIENT)
Age: 81
End: 2021-02-01

## 2021-02-03 ENCOUNTER — APPOINTMENT (OUTPATIENT)
Dept: PHYSICAL MEDICINE AND REHAB | Facility: CLINIC | Age: 81
End: 2021-02-03

## 2021-02-11 ENCOUNTER — APPOINTMENT (OUTPATIENT)
Dept: RHEUMATOLOGY | Facility: CLINIC | Age: 81
End: 2021-02-11
Payer: MEDICARE

## 2021-02-11 VITALS
HEART RATE: 61 BPM | BODY MASS INDEX: 23.8 KG/M2 | TEMPERATURE: 97.2 F | SYSTOLIC BLOOD PRESSURE: 132 MMHG | WEIGHT: 170 LBS | DIASTOLIC BLOOD PRESSURE: 80 MMHG | HEIGHT: 71 IN | RESPIRATION RATE: 17 BRPM | OXYGEN SATURATION: 97 %

## 2021-02-11 DIAGNOSIS — Z86.73 PERSONAL HISTORY OF TRANSIENT ISCHEMIC ATTACK (TIA), AND CEREBRAL INFARCTION W/OUT RESIDUAL DEFICITS: ICD-10-CM

## 2021-02-11 PROCEDURE — 99215 OFFICE O/P EST HI 40 MIN: CPT

## 2021-02-11 RX ORDER — PIROXICAM 10 MG/1
10 CAPSULE ORAL
Qty: 90 | Refills: 0 | Status: DISCONTINUED | COMMUNITY
Start: 2020-01-13 | End: 2021-02-11

## 2021-02-11 RX ORDER — GABAPENTIN 600 MG/1
600 TABLET, COATED ORAL 3 TIMES DAILY
Qty: 270 | Refills: 0 | Status: DISCONTINUED | COMMUNITY
End: 2021-02-11

## 2021-02-11 RX ORDER — CYCLOBENZAPRINE HYDROCHLORIDE 10 MG/1
10 TABLET, FILM COATED ORAL
Qty: 90 | Refills: 0 | Status: DISCONTINUED | COMMUNITY
Start: 2020-01-18 | End: 2021-02-11

## 2021-02-11 RX ORDER — BUPROPION HYDROCHLORIDE 150 MG/1
150 TABLET, FILM COATED, EXTENDED RELEASE ORAL
Qty: 90 | Refills: 0 | Status: DISCONTINUED | COMMUNITY
Start: 2019-11-29 | End: 2021-02-11

## 2021-02-11 RX ORDER — ALENDRONATE SODIUM 70 MG/1
70 TABLET ORAL
Qty: 12 | Refills: 0 | Status: DISCONTINUED | COMMUNITY
Start: 2018-07-20 | End: 2021-02-11

## 2021-02-13 PROBLEM — Z86.73 STATUS POST CVA: Status: RESOLVED | Noted: 2021-02-13 | Resolved: 2021-02-13

## 2021-02-14 LAB
ALBUMIN SERPL ELPH-MCNC: 4.6 G/DL
ALP BLD-CCNC: 65 U/L
ALT SERPL-CCNC: 17 U/L
ANION GAP SERPL CALC-SCNC: 12 MMOL/L
AST SERPL-CCNC: 16 U/L
BASOPHILS # BLD AUTO: 0.04 K/UL
BASOPHILS NFR BLD AUTO: 0.5 %
BILIRUB SERPL-MCNC: 0.6 MG/DL
BUN SERPL-MCNC: 19 MG/DL
CALCIUM SERPL-MCNC: 9.7 MG/DL
CHLORIDE SERPL-SCNC: 101 MMOL/L
CO2 SERPL-SCNC: 28 MMOL/L
CREAT SERPL-MCNC: 0.91 MG/DL
CRP SERPL-MCNC: <0.1 MG/DL
ENA SS-A AB SER IA-ACNC: 5.6 AL
ENA SS-B AB SER IA-ACNC: <0.2 AL
EOSINOPHIL # BLD AUTO: 0.09 K/UL
EOSINOPHIL NFR BLD AUTO: 1.1 %
ERYTHROCYTE [SEDIMENTATION RATE] IN BLOOD BY WESTERGREN METHOD: 4 MM/HR
GLUCOSE SERPL-MCNC: 104 MG/DL
HCT VFR BLD CALC: 43 %
HGB BLD-MCNC: 13.7 G/DL
IMM GRANULOCYTES NFR BLD AUTO: 0.4 %
LDH SERPL-CCNC: 153 U/L
LYMPHOCYTES # BLD AUTO: 1.72 K/UL
LYMPHOCYTES NFR BLD AUTO: 20.5 %
MAN DIFF?: NORMAL
MCHC RBC-ENTMCNC: 31.9 GM/DL
MCHC RBC-ENTMCNC: 32.2 PG
MCV RBC AUTO: 101.2 FL
MONOCYTES # BLD AUTO: 0.71 K/UL
MONOCYTES NFR BLD AUTO: 8.5 %
NEUTROPHILS # BLD AUTO: 5.81 K/UL
NEUTROPHILS NFR BLD AUTO: 69 %
PHOSPHATE SERPL-MCNC: 3.2 MG/DL
PLATELET # BLD AUTO: 258 K/UL
POTASSIUM SERPL-SCNC: 5.3 MMOL/L
PROT SERPL-MCNC: 7.2 G/DL
RBC # BLD: 4.25 M/UL
RBC # FLD: 14.1 %
SODIUM SERPL-SCNC: 141 MMOL/L
TSH SERPL-ACNC: 2.27 UIU/ML
WBC # FLD AUTO: 8.4 K/UL

## 2021-02-15 LAB
FOLATE SERPL-MCNC: >20 NG/ML
VIT B12 SERPL-MCNC: 1974 PG/ML

## 2021-03-03 ENCOUNTER — OUTPATIENT (OUTPATIENT)
Dept: OUTPATIENT SERVICES | Facility: HOSPITAL | Age: 81
LOS: 1 days | End: 2021-03-03
Payer: MEDICARE

## 2021-03-03 DIAGNOSIS — G40.001 LOCALIZATION-RELATED (FOCAL) (PARTIAL) IDIOPATHIC EPILEPSY AND EPILEPTIC SYNDROMES WITH SEIZURES OF LOCALIZED ONSET, NOT INTRACTABLE, WITH STATUS EPILEPTICUS: ICD-10-CM

## 2021-03-03 DIAGNOSIS — Z95.1 PRESENCE OF AORTOCORONARY BYPASS GRAFT: Chronic | ICD-10-CM

## 2021-03-03 PROCEDURE — 95816 EEG AWAKE AND DROWSY: CPT | Mod: 26

## 2021-03-03 PROCEDURE — 95816 EEG AWAKE AND DROWSY: CPT

## 2021-03-04 DIAGNOSIS — G40.001 LOCALIZATION-RELATED (FOCAL) (PARTIAL) IDIOPATHIC EPILEPSY AND EPILEPTIC SYNDROMES WITH SEIZURES OF LOCALIZED ONSET, NOT INTRACTABLE, WITH STATUS EPILEPTICUS: ICD-10-CM

## 2021-05-05 ENCOUNTER — RX RENEWAL (OUTPATIENT)
Age: 81
End: 2021-05-05

## 2021-05-14 ENCOUNTER — APPOINTMENT (OUTPATIENT)
Dept: RHEUMATOLOGY | Facility: CLINIC | Age: 81
End: 2021-05-14
Payer: MEDICARE

## 2021-05-14 VITALS
HEIGHT: 71 IN | TEMPERATURE: 98 F | WEIGHT: 170 LBS | HEART RATE: 59 BPM | BODY MASS INDEX: 23.8 KG/M2 | RESPIRATION RATE: 17 BRPM | DIASTOLIC BLOOD PRESSURE: 70 MMHG | SYSTOLIC BLOOD PRESSURE: 112 MMHG | OXYGEN SATURATION: 97 %

## 2021-05-14 DIAGNOSIS — M21.952 UNSPECIFIED ACQUIRED DEFORMITY OF RIGHT THIGH: ICD-10-CM

## 2021-05-14 DIAGNOSIS — Z79.1 LONG TERM (CURRENT) USE OF NON-STEROIDAL ANTI-INFLAMMATORIES (NSAID): ICD-10-CM

## 2021-05-14 DIAGNOSIS — R53.83 OTHER FATIGUE: ICD-10-CM

## 2021-05-14 DIAGNOSIS — M21.951 UNSPECIFIED ACQUIRED DEFORMITY OF RIGHT THIGH: ICD-10-CM

## 2021-05-14 PROCEDURE — 99215 OFFICE O/P EST HI 40 MIN: CPT

## 2021-05-14 RX ORDER — ALENDRONATE SODIUM 70 MG/1
70 TABLET ORAL
Qty: 12 | Refills: 0 | Status: ACTIVE | COMMUNITY
Start: 2021-02-13 | End: 1900-01-01

## 2021-05-17 NOTE — HISTORY OF PRESENT ILLNESS
[FreeTextEntry1] : 80year-old man for follow-up visit regarding his joint symptoms and rheumatic diseases, including osteoarthritis, fibromyalgia, Sjogren's syndrome, Raynaud's, osteoporosis, with wedge compression fracture at T12, previous history of rheumatoid arthritis.

## 2021-05-17 NOTE — PHYSICAL EXAM
[General Appearance - Alert] : alert [General Appearance - In No Acute Distress] : in no acute distress [General Appearance - Well Nourished] : well nourished [General Appearance - Well Developed] : well developed [General Appearance - Well-Appearing] : healthy appearing [Sclera] : the sclera and conjunctiva were normal [PERRL With Normal Accommodation] : pupils were equal in size, round, and reactive to light [Extraocular Movements] : extraocular movements were intact [Outer Ear] : the ears and nose were normal in appearance [Neck Appearance] : the appearance of the neck was normal [Neck Cervical Mass (___cm)] : no neck mass was observed [Jugular Venous Distention Increased] : there was no jugular-venous distention [Thyroid Diffuse Enlargement] : the thyroid was not enlarged [Thyroid Nodule] : there were no palpable thyroid nodules [Lungs Percussion] : the lungs were normal to percussion [Heart Rate And Rhythm] : heart rate was normal and rhythm regular [Edema] : there was no peripheral edema [Abdomen Soft] : soft [Abdomen Tenderness] : non-tender [Abdomen Mass (___ Cm)] : no abdominal mass palpated [Cervical Lymph Nodes Enlarged Posterior Bilaterally] : posterior cervical [Cervical Lymph Nodes Enlarged Anterior Bilaterally] : anterior cervical [Supraclavicular Lymph Nodes Enlarged Bilaterally] : supraclavicular [Axillary Lymph Nodes Enlarged Bilaterally] : axillary [No CVA Tenderness] : no ~M costovertebral angle tenderness [No Spinal Tenderness] : no spinal tenderness [Skin Color & Pigmentation] : normal skin color and pigmentation [Skin Turgor] : normal skin turgor [] : no rash [Cranial Nerves] : cranial nerves 2-12 were intact [Deep Tendon Reflexes (DTR)] : deep tendon reflexes were 2+ and symmetric [Sensation] : the sensory exam was normal to light touch and pinprick [Motor Exam] : the motor exam was normal [No Focal Deficits] : no focal deficits [Oriented To Time, Place, And Person] : oriented to person, place, and time [Impaired Insight] : insight and judgment were intact [Affect] : the affect was normal [Mood] : the mood was normal [FreeTextEntry1] : Strength--right dorsiflexion 4-1/2/5, otherwise 5/5

## 2021-06-09 NOTE — PROGRESS NOTE ADULT - ASSESSMENT
CVA  ASA/Statin  PT/OT per rehab    HTN  Atenolol 50, Norvasc(dec to 5), Lisinopril 40  At home pt was on Atenolol 50/Norvasc 5/Lisinopril 10    Prediabetes  Metformin    DVT ppx  Lovenox     Jack Avendano  CARDIOVASCULAR DISEASE  [address] [address]  Phone: [phone]  Fax: [phone]  Follow Up Time:

## 2021-06-21 LAB
25(OH)D3 SERPL-MCNC: 61.8 NG/ML
ALBUMIN SERPL ELPH-MCNC: 4.5 G/DL
ALP BLD-CCNC: 80 U/L
ALT SERPL-CCNC: 35 U/L
ANION GAP SERPL CALC-SCNC: 15 MMOL/L
APPEARANCE: CLEAR
AST SERPL-CCNC: 30 U/L
BACTERIA: NEGATIVE
BASOPHILS # BLD AUTO: 0.05 K/UL
BASOPHILS NFR BLD AUTO: 0.6 %
BILIRUB SERPL-MCNC: 0.4 MG/DL
BILIRUBIN URINE: ABNORMAL
BLOOD URINE: NEGATIVE
BUN SERPL-MCNC: 29 MG/DL
CALCIUM SERPL-MCNC: 9.6 MG/DL
CHLORIDE SERPL-SCNC: 102 MMOL/L
CO2 SERPL-SCNC: 24 MMOL/L
COLOR: ABNORMAL
CREAT SERPL-MCNC: 0.84 MG/DL
CRP SERPL-MCNC: <3 MG/L
ENA SS-A AB SER IA-ACNC: 6.3 AL
ENA SS-B AB SER IA-ACNC: <0.2 AL
EOSINOPHIL # BLD AUTO: 0.16 K/UL
EOSINOPHIL NFR BLD AUTO: 1.9 %
ERYTHROCYTE [SEDIMENTATION RATE] IN BLOOD BY WESTERGREN METHOD: 10 MM/HR
GLUCOSE QUALITATIVE U: NEGATIVE
GLUCOSE SERPL-MCNC: 136 MG/DL
HCT VFR BLD CALC: 41.2 %
HGB BLD-MCNC: 13.6 G/DL
HYALINE CASTS: 1 /LPF
IMM GRANULOCYTES NFR BLD AUTO: 0.1 %
KETONES URINE: ABNORMAL
LDH SERPL-CCNC: 163 U/L
LEUKOCYTE ESTERASE URINE: NEGATIVE
LYMPHOCYTES # BLD AUTO: 0.96 K/UL
LYMPHOCYTES NFR BLD AUTO: 11.4 %
MAN DIFF?: NORMAL
MCHC RBC-ENTMCNC: 31.6 PG
MCHC RBC-ENTMCNC: 33 GM/DL
MCV RBC AUTO: 95.8 FL
MICROSCOPIC-UA: NORMAL
MONOCYTES # BLD AUTO: 0.66 K/UL
MONOCYTES NFR BLD AUTO: 7.8 %
NEUTROPHILS # BLD AUTO: 6.6 K/UL
NEUTROPHILS NFR BLD AUTO: 78.2 %
NITRITE URINE: NEGATIVE
PH URINE: 6
PHOSPHATE SERPL-MCNC: 3.4 MG/DL
PLATELET # BLD AUTO: 260 K/UL
POTASSIUM SERPL-SCNC: 4.2 MMOL/L
PROT SERPL-MCNC: 6.9 G/DL
PROTEIN URINE: NORMAL
RBC # BLD: 4.3 M/UL
RBC # FLD: 13.5 %
RED BLOOD CELLS URINE: 6 /HPF
SODIUM SERPL-SCNC: 142 MMOL/L
SPECIFIC GRAVITY URINE: >=1.03
SQUAMOUS EPITHELIAL CELLS: 0 /HPF
UROBILINOGEN URINE: NORMAL
WBC # FLD AUTO: 8.44 K/UL
WHITE BLOOD CELLS URINE: 2 /HPF

## 2021-07-11 ENCOUNTER — RX RENEWAL (OUTPATIENT)
Age: 81
End: 2021-07-11

## 2021-08-24 ENCOUNTER — LABORATORY RESULT (OUTPATIENT)
Age: 81
End: 2021-08-24

## 2021-08-24 ENCOUNTER — APPOINTMENT (OUTPATIENT)
Dept: RHEUMATOLOGY | Facility: CLINIC | Age: 81
End: 2021-08-24
Payer: MEDICARE

## 2021-08-24 VITALS — TEMPERATURE: 98.2 F | RESPIRATION RATE: 17 BRPM | HEIGHT: 71 IN | HEART RATE: 57 BPM | OXYGEN SATURATION: 96 %

## 2021-08-24 DIAGNOSIS — R53.1 WEAKNESS: ICD-10-CM

## 2021-08-24 PROCEDURE — 99214 OFFICE O/P EST MOD 30 MIN: CPT | Mod: 25

## 2021-08-24 PROCEDURE — 36415 COLL VENOUS BLD VENIPUNCTURE: CPT

## 2021-08-24 RX ORDER — GABAPENTIN 300 MG/1
300 CAPSULE ORAL 3 TIMES DAILY
Qty: 270 | Refills: 0 | Status: DISCONTINUED | COMMUNITY
Start: 1900-01-01 | End: 2021-08-24

## 2021-08-28 LAB
ALBUMIN SERPL ELPH-MCNC: 4.5 G/DL
ALDOLASE SERPL-CCNC: 4.3 U/L
ALP BLD-CCNC: 58 U/L
ALT SERPL-CCNC: 34 U/L
ANA SER IF-ACNC: NEGATIVE
ANION GAP SERPL CALC-SCNC: 12 MMOL/L
AST SERPL-CCNC: 38 U/L
BASOPHILS # BLD AUTO: 0.04 K/UL
BASOPHILS NFR BLD AUTO: 0.6 %
BILIRUB SERPL-MCNC: 0.4 MG/DL
BUN SERPL-MCNC: 23 MG/DL
C3 SERPL-MCNC: 107 MG/DL
C4 SERPL-MCNC: 32 MG/DL
CALCIUM SERPL-MCNC: 9.7 MG/DL
CCP AB SER IA-ACNC: <8 UNITS
CHLORIDE SERPL-SCNC: 103 MMOL/L
CK SERPL-CCNC: 220 U/L
CO2 SERPL-SCNC: 28 MMOL/L
CREAT SERPL-MCNC: 0.72 MG/DL
CRP SERPL-MCNC: <3 MG/L
ENA SS-A AB SER IA-ACNC: 6.9 AL
ENA SS-B AB SER IA-ACNC: <0.2 AL
EOSINOPHIL # BLD AUTO: 0.07 K/UL
EOSINOPHIL NFR BLD AUTO: 1.1 %
ERYTHROCYTE [SEDIMENTATION RATE] IN BLOOD BY WESTERGREN METHOD: 6 MM/HR
FOLATE SERPL-MCNC: >20 NG/ML
GLUCOSE SERPL-MCNC: 119 MG/DL
HCT VFR BLD CALC: 40.3 %
HGB BLD-MCNC: 13.3 G/DL
IMM GRANULOCYTES NFR BLD AUTO: 0.3 %
LDH SERPL-CCNC: 164 U/L
LYMPHOCYTES # BLD AUTO: 1.01 K/UL
LYMPHOCYTES NFR BLD AUTO: 16 %
MAN DIFF?: NORMAL
MCHC RBC-ENTMCNC: 32.5 PG
MCHC RBC-ENTMCNC: 33 GM/DL
MCV RBC AUTO: 98.5 FL
MONOCYTES # BLD AUTO: 0.5 K/UL
MONOCYTES NFR BLD AUTO: 7.9 %
MPO AB + PR3 PNL SER: NORMAL
NEUTROPHILS # BLD AUTO: 4.67 K/UL
NEUTROPHILS NFR BLD AUTO: 74.1 %
PHOSPHATE SERPL-MCNC: 3.8 MG/DL
PLATELET # BLD AUTO: 234 K/UL
POTASSIUM SERPL-SCNC: 4.9 MMOL/L
PROT SERPL-MCNC: 6.6 G/DL
RBC # BLD: 4.09 M/UL
RBC # FLD: 14.4 %
RF+CCP IGG SER-IMP: NEGATIVE
RHEUMATOID FACT SER QL: <10 IU/ML
SODIUM SERPL-SCNC: 143 MMOL/L
TSH SERPL-ACNC: 2.48 UIU/ML
VIT B12 SERPL-MCNC: >2000 PG/ML
WBC # FLD AUTO: 6.31 K/UL

## 2021-09-03 ENCOUNTER — NON-APPOINTMENT (OUTPATIENT)
Age: 81
End: 2021-09-03

## 2021-09-06 LAB
MYELOPEROXIDASE AB SER QL IA: <5 UNITS
MYELOPEROXIDASE CELLS FLD QL: NEGATIVE
PROTEINASE3 AB SER IA-ACNC: <5 UNITS
PROTEINASE3 AB SER-ACNC: NEGATIVE

## 2021-09-20 ENCOUNTER — NON-APPOINTMENT (OUTPATIENT)
Age: 81
End: 2021-09-20

## 2021-09-27 ENCOUNTER — APPOINTMENT (OUTPATIENT)
Dept: RHEUMATOLOGY | Facility: CLINIC | Age: 81
End: 2021-09-27

## 2021-10-02 ENCOUNTER — RX RENEWAL (OUTPATIENT)
Age: 81
End: 2021-10-02

## 2021-10-02 RX ORDER — ERGOCALCIFEROL 1.25 MG/1
1.25 MG CAPSULE, LIQUID FILLED ORAL
Qty: 6 | Refills: 0 | Status: ACTIVE | COMMUNITY
Start: 2017-09-18 | End: 1900-01-01

## 2021-11-17 ENCOUNTER — APPOINTMENT (OUTPATIENT)
Dept: RHEUMATOLOGY | Facility: CLINIC | Age: 81
End: 2021-11-17

## 2021-11-24 ENCOUNTER — TRANSCRIPTION ENCOUNTER (OUTPATIENT)
Age: 81
End: 2021-11-24

## 2021-11-24 ENCOUNTER — APPOINTMENT (OUTPATIENT)
Dept: RHEUMATOLOGY | Facility: CLINIC | Age: 81
End: 2021-11-24
Payer: MEDICARE

## 2021-11-24 DIAGNOSIS — M54.41 LUMBAGO WITH SCIATICA, LEFT SIDE: ICD-10-CM

## 2021-11-24 DIAGNOSIS — R53.1 WEAKNESS: ICD-10-CM

## 2021-11-24 DIAGNOSIS — R26.2 DIFFICULTY IN WALKING, NOT ELSEWHERE CLASSIFIED: ICD-10-CM

## 2021-11-24 DIAGNOSIS — M15.9 POLYOSTEOARTHRITIS, UNSPECIFIED: ICD-10-CM

## 2021-11-24 DIAGNOSIS — G89.29 LUMBAGO WITH SCIATICA, LEFT SIDE: ICD-10-CM

## 2021-11-24 DIAGNOSIS — M79.7 FIBROMYALGIA: ICD-10-CM

## 2021-11-24 DIAGNOSIS — Z79.83 ENCOUNTER FOR THERAPEUTIC DRUG LVL MONITORING: ICD-10-CM

## 2021-11-24 DIAGNOSIS — M54.42 LUMBAGO WITH SCIATICA, LEFT SIDE: ICD-10-CM

## 2021-11-24 DIAGNOSIS — Z51.81 ENCOUNTER FOR THERAPEUTIC DRUG LVL MONITORING: ICD-10-CM

## 2021-11-24 DIAGNOSIS — M81.8 OTHER OSTEOPOROSIS W/OUT CURRENT PATHOLOGICAL FRACTURE: ICD-10-CM

## 2021-11-24 DIAGNOSIS — S22.080A WEDGE COMPRESSION FRACTURE OF T11-T12 VERTEBRA, INITIAL ENCOUNTER FOR CLOSED FRACTURE: ICD-10-CM

## 2021-11-24 DIAGNOSIS — M79.10 MYALGIA, UNSPECIFIED SITE: ICD-10-CM

## 2021-11-24 DIAGNOSIS — I73.00 RAYNAUD'S SYNDROME W/OUT GANGRENE: ICD-10-CM

## 2021-11-24 DIAGNOSIS — M35.00 SICCA SYNDROME, UNSPECIFIED: ICD-10-CM

## 2021-11-24 PROCEDURE — 99215 OFFICE O/P EST HI 40 MIN: CPT | Mod: 95

## 2021-11-26 ENCOUNTER — INPATIENT (INPATIENT)
Facility: HOSPITAL | Age: 81
LOS: 6 days | Discharge: NURSING, REHAB OR LONG-TERM CARE | DRG: 519 | End: 2021-12-03
Attending: NEUROLOGICAL SURGERY | Admitting: INTERNAL MEDICINE
Payer: MEDICARE

## 2021-11-26 ENCOUNTER — RESULT REVIEW (OUTPATIENT)
Age: 81
End: 2021-11-26

## 2021-11-26 VITALS
HEIGHT: 71 IN | HEART RATE: 61 BPM | TEMPERATURE: 98 F | OXYGEN SATURATION: 96 % | DIASTOLIC BLOOD PRESSURE: 73 MMHG | WEIGHT: 160.06 LBS | SYSTOLIC BLOOD PRESSURE: 128 MMHG | RESPIRATION RATE: 19 BRPM

## 2021-11-26 DIAGNOSIS — G95.20 UNSPECIFIED CORD COMPRESSION: ICD-10-CM

## 2021-11-26 DIAGNOSIS — Z95.1 PRESENCE OF AORTOCORONARY BYPASS GRAFT: Chronic | ICD-10-CM

## 2021-11-26 LAB
ALBUMIN SERPL ELPH-MCNC: 4.4 G/DL — SIGNIFICANT CHANGE UP (ref 3.3–5.2)
ALP SERPL-CCNC: 69 U/L — SIGNIFICANT CHANGE UP (ref 40–120)
ALT FLD-CCNC: 30 U/L — SIGNIFICANT CHANGE UP
ANION GAP SERPL CALC-SCNC: 14 MMOL/L — SIGNIFICANT CHANGE UP (ref 5–17)
AST SERPL-CCNC: 27 U/L — SIGNIFICANT CHANGE UP
BASOPHILS # BLD AUTO: 0.03 K/UL — SIGNIFICANT CHANGE UP (ref 0–0.2)
BASOPHILS NFR BLD AUTO: 0.4 % — SIGNIFICANT CHANGE UP (ref 0–2)
BILIRUB SERPL-MCNC: 0.6 MG/DL — SIGNIFICANT CHANGE UP (ref 0.4–2)
BUN SERPL-MCNC: 16.5 MG/DL — SIGNIFICANT CHANGE UP (ref 8–20)
CALCIUM SERPL-MCNC: 9.7 MG/DL — SIGNIFICANT CHANGE UP (ref 8.6–10.2)
CHLORIDE SERPL-SCNC: 98 MMOL/L — SIGNIFICANT CHANGE UP (ref 98–107)
CO2 SERPL-SCNC: 27 MMOL/L — SIGNIFICANT CHANGE UP (ref 22–29)
CREAT SERPL-MCNC: 0.52 MG/DL — SIGNIFICANT CHANGE UP (ref 0.5–1.3)
CRP SERPL-MCNC: <4 MG/L — SIGNIFICANT CHANGE UP
EOSINOPHIL # BLD AUTO: 0.04 K/UL — SIGNIFICANT CHANGE UP (ref 0–0.5)
EOSINOPHIL NFR BLD AUTO: 0.6 % — SIGNIFICANT CHANGE UP (ref 0–6)
ERYTHROCYTE [SEDIMENTATION RATE] IN BLOOD: 26 MM/HR — HIGH (ref 0–20)
GLUCOSE SERPL-MCNC: 91 MG/DL — SIGNIFICANT CHANGE UP (ref 70–99)
HCT VFR BLD CALC: 38.3 % — LOW (ref 39–50)
HGB BLD-MCNC: 13.1 G/DL — SIGNIFICANT CHANGE UP (ref 13–17)
IMM GRANULOCYTES NFR BLD AUTO: 0.3 % — SIGNIFICANT CHANGE UP (ref 0–1.5)
LYMPHOCYTES # BLD AUTO: 1.18 K/UL — SIGNIFICANT CHANGE UP (ref 1–3.3)
LYMPHOCYTES # BLD AUTO: 16.5 % — SIGNIFICANT CHANGE UP (ref 13–44)
MCHC RBC-ENTMCNC: 32.3 PG — SIGNIFICANT CHANGE UP (ref 27–34)
MCHC RBC-ENTMCNC: 34.2 GM/DL — SIGNIFICANT CHANGE UP (ref 32–36)
MCV RBC AUTO: 94.6 FL — SIGNIFICANT CHANGE UP (ref 80–100)
MONOCYTES # BLD AUTO: 0.62 K/UL — SIGNIFICANT CHANGE UP (ref 0–0.9)
MONOCYTES NFR BLD AUTO: 8.7 % — SIGNIFICANT CHANGE UP (ref 2–14)
NEUTROPHILS # BLD AUTO: 5.25 K/UL — SIGNIFICANT CHANGE UP (ref 1.8–7.4)
NEUTROPHILS NFR BLD AUTO: 73.5 % — SIGNIFICANT CHANGE UP (ref 43–77)
PLATELET # BLD AUTO: 275 K/UL — SIGNIFICANT CHANGE UP (ref 150–400)
POTASSIUM SERPL-MCNC: 4.2 MMOL/L — SIGNIFICANT CHANGE UP (ref 3.5–5.3)
POTASSIUM SERPL-SCNC: 4.2 MMOL/L — SIGNIFICANT CHANGE UP (ref 3.5–5.3)
PROT SERPL-MCNC: 7.3 G/DL — SIGNIFICANT CHANGE UP (ref 6.6–8.7)
RBC # BLD: 4.05 M/UL — LOW (ref 4.2–5.8)
RBC # FLD: 13.4 % — SIGNIFICANT CHANGE UP (ref 10.3–14.5)
SODIUM SERPL-SCNC: 139 MMOL/L — SIGNIFICANT CHANGE UP (ref 135–145)
WBC # BLD: 7.14 K/UL — SIGNIFICANT CHANGE UP (ref 3.8–10.5)
WBC # FLD AUTO: 7.14 K/UL — SIGNIFICANT CHANGE UP (ref 3.8–10.5)

## 2021-11-26 PROCEDURE — 72148 MRI LUMBAR SPINE W/O DYE: CPT | Mod: 26,ME

## 2021-11-26 PROCEDURE — 72146 MRI CHEST SPINE W/O DYE: CPT | Mod: 26,MG

## 2021-11-26 PROCEDURE — G1004: CPT

## 2021-11-26 PROCEDURE — 88304 TISSUE EXAM BY PATHOLOGIST: CPT | Mod: 26

## 2021-11-26 PROCEDURE — 99223 1ST HOSP IP/OBS HIGH 75: CPT

## 2021-11-26 PROCEDURE — 99291 CRITICAL CARE FIRST HOUR: CPT

## 2021-11-26 RX ORDER — DEXAMETHASONE 0.5 MG/5ML
10 ELIXIR ORAL ONCE
Refills: 0 | Status: COMPLETED | OUTPATIENT
Start: 2021-11-26 | End: 2021-11-26

## 2021-11-26 RX ADMIN — Medication 102 MILLIGRAM(S): at 22:38

## 2021-11-26 NOTE — ED PROVIDER NOTE - PROGRESS NOTE DETAILS
Evangelina MARES for ED attending, Dr. Pena. Dr. Pena received sign out on this patient from Dr. Estrella.  Received call from radiology. Patient has a large herniated disc at T10-11 with signs of cord compression. Will consult spine. Spoke with neurosurgery- requesting medicine admission for clearance.

## 2021-11-26 NOTE — H&P ADULT - NSHPPHYSICALEXAM_GEN_ALL_CORE
Vital Signs Last 24 Hrs  T(C): 36.7 (26 Nov 2021 23:21), Max: 36.7 (26 Nov 2021 23:21)  T(F): 98 (26 Nov 2021 23:21), Max: 98 (26 Nov 2021 23:21)  HR: 57 (26 Nov 2021 23:21) (54 - 61)  BP: 111/65 (26 Nov 2021 23:21) (111/65 - 128/73)  BP(mean): --  RR: 18 (26 Nov 2021 23:21) (18 - 19)  SpO2: 94% (26 Nov 2021 23:21) (94% - 96%)

## 2021-11-26 NOTE — ED ADULT NURSE REASSESSMENT NOTE - NS ED NURSE REASSESS COMMENT FT1
pt remains awake and alert in no apparent distress, rodriguez cath draining clear yellow urine thru patent rodriguez. pt awaiting CT and MRI at this time. offers no complaints at this time, will continue to monitor.

## 2021-11-26 NOTE — ED ADULT NURSE REASSESSMENT NOTE - NS ED NURSE REASSESS COMMENT FT1
report handed off to [facility] RN at this time, pt awaiting full admit orders. IV site patent, comfortable appearing. offers no complaints.

## 2021-11-26 NOTE — ED ADULT NURSE NOTE - NS PRO AD NO ADVANCE DIRECTIVE
[de-identified] : CERVICAL SPINE 7/14/21  Compared with 2019; LUMBAR SPINE [facility] No

## 2021-11-26 NOTE — ED ADULT NURSE NOTE - OBJECTIVE STATEMENT
pt with reports of bilateral leg weakness x 2 months. states back in september he woke up one morning not being able to ambulate. pt states he has been following up with neuro and they sent him in for an MRI. pt denies complaints at this time other than weakness, reports no recent illness, no fevers. pt with no reports of pain, is able to move legs but cannot ambulate
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
## 2021-11-26 NOTE — ED ADULT TRIAGE NOTE - CHIEF COMPLAINT QUOTE
Pt RICHARDA sent in by MD Kleiner(rheumotology)  for evaluation of weakness to bilateral LE.  As per pt., he has been unable to ambulate since september with intermittent numbness/tingling to Bilateral LE.  Pt. unable to get neuro appt outpt and sent in for evaluation.  Pt. denies changes to bowel or bladder
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
## 2021-11-26 NOTE — ED PROVIDER NOTE - CRITICAL CARE ATTENDING CONTRIBUTION TO CARE
I independently spent above time on critical care. I spent 35 minutes of critical care time with this patient. This does not include time spent on separately reported billable procedures.

## 2021-11-27 ENCOUNTER — TRANSCRIPTION ENCOUNTER (OUTPATIENT)
Age: 81
End: 2021-11-27

## 2021-11-27 LAB
A1C WITH ESTIMATED AVERAGE GLUCOSE RESULT: 5.8 % — HIGH (ref 4–5.6)
APTT BLD: 30.4 SEC — SIGNIFICANT CHANGE UP (ref 27.5–35.5)
BLD GP AB SCN SERPL QL: SIGNIFICANT CHANGE UP
ESTIMATED AVERAGE GLUCOSE: 120 MG/DL — HIGH (ref 68–114)
GLUCOSE BLDC GLUCOMTR-MCNC: 160 MG/DL — HIGH (ref 70–99)
GLUCOSE BLDC GLUCOMTR-MCNC: 178 MG/DL — HIGH (ref 70–99)
GLUCOSE BLDC GLUCOMTR-MCNC: 182 MG/DL — HIGH (ref 70–99)
GLUCOSE BLDC GLUCOMTR-MCNC: 198 MG/DL — HIGH (ref 70–99)
INR BLD: 1.06 RATIO — SIGNIFICANT CHANGE UP (ref 0.88–1.16)
PROTHROM AB SERPL-ACNC: 12.3 SEC — SIGNIFICANT CHANGE UP (ref 10.6–13.6)
RAPID RVP RESULT: SIGNIFICANT CHANGE UP
SARS-COV-2 RNA SPEC QL NAA+PROBE: SIGNIFICANT CHANGE UP

## 2021-11-27 PROCEDURE — 72131 CT LUMBAR SPINE W/O DYE: CPT | Mod: 26

## 2021-11-27 PROCEDURE — 72128 CT CHEST SPINE W/O DYE: CPT | Mod: 26

## 2021-11-27 PROCEDURE — 72141 MRI NECK SPINE W/O DYE: CPT | Mod: 26

## 2021-11-27 PROCEDURE — 99232 SBSQ HOSP IP/OBS MODERATE 35: CPT

## 2021-11-27 PROCEDURE — 93010 ELECTROCARDIOGRAM REPORT: CPT

## 2021-11-27 RX ORDER — DEXAMETHASONE 0.5 MG/5ML
4 ELIXIR ORAL EVERY 8 HOURS
Refills: 0 | Status: DISCONTINUED | OUTPATIENT
Start: 2021-11-27 | End: 2021-11-27

## 2021-11-27 RX ORDER — LISINOPRIL 2.5 MG/1
20 TABLET ORAL DAILY
Refills: 0 | Status: DISCONTINUED | OUTPATIENT
Start: 2021-11-27 | End: 2021-11-28

## 2021-11-27 RX ORDER — DEXTROSE 50 % IN WATER 50 %
12.5 SYRINGE (ML) INTRAVENOUS ONCE
Refills: 0 | Status: DISCONTINUED | OUTPATIENT
Start: 2021-11-27 | End: 2021-12-03

## 2021-11-27 RX ORDER — DEXTROSE 50 % IN WATER 50 %
25 SYRINGE (ML) INTRAVENOUS ONCE
Refills: 0 | Status: DISCONTINUED | OUTPATIENT
Start: 2021-11-27 | End: 2021-12-03

## 2021-11-27 RX ORDER — DULOXETINE HYDROCHLORIDE 30 MG/1
20 CAPSULE, DELAYED RELEASE ORAL DAILY
Refills: 0 | Status: DISCONTINUED | OUTPATIENT
Start: 2021-11-27 | End: 2021-12-03

## 2021-11-27 RX ORDER — CEFAZOLIN SODIUM 1 G
2000 VIAL (EA) INJECTION ONCE
Refills: 0 | Status: COMPLETED | OUTPATIENT
Start: 2021-11-28 | End: 2021-11-28

## 2021-11-27 RX ORDER — ATENOLOL 25 MG/1
50 TABLET ORAL DAILY
Refills: 0 | Status: DISCONTINUED | OUTPATIENT
Start: 2021-11-27 | End: 2021-11-28

## 2021-11-27 RX ORDER — DEXTROSE 50 % IN WATER 50 %
15 SYRINGE (ML) INTRAVENOUS ONCE
Refills: 0 | Status: DISCONTINUED | OUTPATIENT
Start: 2021-11-27 | End: 2021-12-03

## 2021-11-27 RX ORDER — ACETAMINOPHEN 500 MG
650 TABLET ORAL EVERY 6 HOURS
Refills: 0 | Status: DISCONTINUED | OUTPATIENT
Start: 2021-11-27 | End: 2021-12-03

## 2021-11-27 RX ORDER — LANOLIN ALCOHOL/MO/W.PET/CERES
3 CREAM (GRAM) TOPICAL AT BEDTIME
Refills: 0 | Status: DISCONTINUED | OUTPATIENT
Start: 2021-11-27 | End: 2021-12-03

## 2021-11-27 RX ORDER — SODIUM CHLORIDE 9 MG/ML
1000 INJECTION, SOLUTION INTRAVENOUS
Refills: 0 | Status: DISCONTINUED | OUTPATIENT
Start: 2021-11-27 | End: 2021-12-03

## 2021-11-27 RX ORDER — AMLODIPINE BESYLATE 2.5 MG/1
5 TABLET ORAL DAILY
Refills: 0 | Status: DISCONTINUED | OUTPATIENT
Start: 2021-11-27 | End: 2021-11-28

## 2021-11-27 RX ORDER — INSULIN LISPRO 100/ML
VIAL (ML) SUBCUTANEOUS AT BEDTIME
Refills: 0 | Status: DISCONTINUED | OUTPATIENT
Start: 2021-11-27 | End: 2021-11-28

## 2021-11-27 RX ORDER — ENOXAPARIN SODIUM 100 MG/ML
40 INJECTION SUBCUTANEOUS DAILY
Refills: 0 | Status: DISCONTINUED | OUTPATIENT
Start: 2021-11-27 | End: 2021-11-27

## 2021-11-27 RX ORDER — GLUCAGON INJECTION, SOLUTION 0.5 MG/.1ML
1 INJECTION, SOLUTION SUBCUTANEOUS ONCE
Refills: 0 | Status: DISCONTINUED | OUTPATIENT
Start: 2021-11-27 | End: 2021-12-03

## 2021-11-27 RX ORDER — GABAPENTIN 400 MG/1
300 CAPSULE ORAL THREE TIMES A DAY
Refills: 0 | Status: DISCONTINUED | OUTPATIENT
Start: 2021-11-27 | End: 2021-12-03

## 2021-11-27 RX ORDER — DEXAMETHASONE 0.5 MG/5ML
4 ELIXIR ORAL EVERY 8 HOURS
Refills: 0 | Status: DISCONTINUED | OUTPATIENT
Start: 2021-11-27 | End: 2021-11-28

## 2021-11-27 RX ORDER — INSULIN LISPRO 100/ML
VIAL (ML) SUBCUTANEOUS
Refills: 0 | Status: DISCONTINUED | OUTPATIENT
Start: 2021-11-27 | End: 2021-11-28

## 2021-11-27 RX ORDER — HYDRALAZINE HCL 50 MG
5 TABLET ORAL EVERY 6 HOURS
Refills: 0 | Status: DISCONTINUED | OUTPATIENT
Start: 2021-11-27 | End: 2021-11-28

## 2021-11-27 RX ORDER — ATORVASTATIN CALCIUM 80 MG/1
80 TABLET, FILM COATED ORAL AT BEDTIME
Refills: 0 | Status: DISCONTINUED | OUTPATIENT
Start: 2021-11-27 | End: 2021-12-03

## 2021-11-27 RX ORDER — ASPIRIN/CALCIUM CARB/MAGNESIUM 324 MG
81 TABLET ORAL DAILY
Refills: 0 | Status: DISCONTINUED | OUTPATIENT
Start: 2021-11-27 | End: 2021-11-27

## 2021-11-27 RX ORDER — PANTOPRAZOLE SODIUM 20 MG/1
40 TABLET, DELAYED RELEASE ORAL
Refills: 0 | Status: DISCONTINUED | OUTPATIENT
Start: 2021-11-27 | End: 2021-12-03

## 2021-11-27 RX ADMIN — Medication 1: at 09:07

## 2021-11-27 RX ADMIN — Medication 4 MILLIGRAM(S): at 14:07

## 2021-11-27 RX ADMIN — ATENOLOL 50 MILLIGRAM(S): 25 TABLET ORAL at 05:30

## 2021-11-27 RX ADMIN — PANTOPRAZOLE SODIUM 40 MILLIGRAM(S): 20 TABLET, DELAYED RELEASE ORAL at 05:30

## 2021-11-27 RX ADMIN — DULOXETINE HYDROCHLORIDE 20 MILLIGRAM(S): 30 CAPSULE, DELAYED RELEASE ORAL at 14:12

## 2021-11-27 RX ADMIN — AMLODIPINE BESYLATE 5 MILLIGRAM(S): 2.5 TABLET ORAL at 05:31

## 2021-11-27 RX ADMIN — Medication 4 MILLIGRAM(S): at 05:30

## 2021-11-27 RX ADMIN — Medication 1: at 17:43

## 2021-11-27 RX ADMIN — ATORVASTATIN CALCIUM 80 MILLIGRAM(S): 80 TABLET, FILM COATED ORAL at 22:42

## 2021-11-27 RX ADMIN — Medication 1: at 11:57

## 2021-11-27 RX ADMIN — Medication 4 MILLIGRAM(S): at 22:43

## 2021-11-27 RX ADMIN — GABAPENTIN 300 MILLIGRAM(S): 400 CAPSULE ORAL at 05:31

## 2021-11-27 RX ADMIN — LISINOPRIL 20 MILLIGRAM(S): 2.5 TABLET ORAL at 05:31

## 2021-11-27 RX ADMIN — GABAPENTIN 300 MILLIGRAM(S): 400 CAPSULE ORAL at 22:42

## 2021-11-27 RX ADMIN — GABAPENTIN 300 MILLIGRAM(S): 400 CAPSULE ORAL at 14:16
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
## 2021-11-27 NOTE — PROGRESS NOTE ADULT - ASSESSMENT
Plan  - Imaging reviewed w/ attending  - OR scheduled 11/28 for 730AM  - Q4 neuro checks  - Decadron 4q8 started  - ASA81 stopped, hold AC/AP/chemical DVT prophylaxis at this time  - Pain control PRN; avoid oversedation  - ISS  - Protonix 40  - b/l SCDs  - NPO after midnight  - CBC/BMP/Mg/Phos/PT/PTT/INR/COVID swab  - Cardiology consulted, pt agreeing with high risk  - D/w medical team, medical optimization for procedure  - Spoke to patient, discussed, risks, benefits, alternatives for procedure. Patient wishing to proceed w/ surgery here. All questions/concerns addressed and understood.   - D/w Dr. Sanches 81 year old Male with PMHX of HTN, HLD, DM2, CAD s/p CABG, chronic back pain due to spinal stenosis, came to the ED complaining of inability to ambulate, started in 9/2021 but in last week has worsened to unable to lift b/l LE.           Plan  - Imaging reviewed w/ attending  - OR scheduled 11/28 for 730AM  - Q4 neuro checks  - Decadron 4q8 started  - ASA81 stopped, hold AC/AP/chemical DVT prophylaxis at this time  - Pain control PRN; avoid oversedation  - ISS  - Protonix 40  - b/l SCDs  - NPO after midnight  - CBC/BMP/Mg/Phos/PT/PTT/INR/COVID swab  - Cardiology consulted, pt agreeing with high risk  - D/w medical team, medical optimization for procedure  - Spoke to patient, discussed, risks, benefits, alternatives for procedure. Patient wishing to proceed w/ surgery here. All questions/concerns addressed and understood.   - D/w Dr. Sanches

## 2021-11-27 NOTE — PROGRESS NOTE ADULT - SUBJECTIVE AND OBJECTIVE BOX
GREGORY BONILLA    [id number]    81y      Male    INTERVAL HPI/OVERNIGHT EVENTS: patient being seen for cord compression.     patient seen at bedside and states feeling weak but denies any incontinence.       REVIEW OF SYSTEMS:    CONSTITUTIONAL: weak  RESPIRATORY: No cough, wheezing, hemoptysis; No shortness of breath  CARDIOVASCULAR: No chest pain, palpitations  GASTROINTESTINAL: No abdominal or epigastric pain. No nausea, vomiting  NEUROLOGICAL: No headaches, memory loss, loss of strength.  MISCELLANEOUS:      Vital Signs Last 24 Hrs  T(C): 36.7 (27 Nov 2021 11:23), Max: 36.7 (26 Nov 2021 23:21)  T(F): 98 (27 Nov 2021 11:23), Max: 98 (26 Nov 2021 23:21)  HR: 62 (27 Nov 2021 11:23) (54 - 62)  BP: 117/56 (27 Nov 2021 11:23) (111/65 - 128/73)  BP(mean): --  RR: 18 (27 Nov 2021 11:23) (18 - 19)  SpO2: 95% (27 Nov 2021 11:23) (93% - 96%)    PHYSICAL EXAM:    GENERAL: NAD,   HEENT: PERRL, +EOMI  NECK: soft, Supple, No JVD,   CHEST/LUNG: Clear to auscultation bilaterally; No wheezing  HEART: S1S2+, Regular rate and rhythm;   ABDOMEN: Soft, Nontender, Nondistended; Bowel sounds present  EXTREMITIES:  2+ Peripheral Pulses, No clubbing, cyanosis, or edema  SKIN: No rashes or lesions  NEURO: AAOX3, left LLE weakness, intact sensory       LABS:                        13.1   7.14  )-----------( 275      ( 26 Nov 2021 15:38 )             38.3     11-26    139  |  98  |  16.5  ----------------------------<  91  4.2   |  27.0  |  0.52    Ca    9.7      26 Nov 2021 17:13    TPro  7.3  /  Alb  4.4  /  TBili  0.6  /  DBili  x   /  AST  27  /  ALT  30  /  AlkPhos  69  11-26      MEDICATIONS  (STANDING):  amLODIPine   Tablet 5 milliGRAM(s) Oral daily  ATENolol  Tablet 50 milliGRAM(s) Oral daily  atorvastatin 80 milliGRAM(s) Oral at bedtime  dexAMETHasone  Injectable 4 milliGRAM(s) IV Push every 8 hours  dextrose 40% Gel 15 Gram(s) Oral once  dextrose 5%. 1000 milliLiter(s) (50 mL/Hr) IV Continuous <Continuous>  dextrose 5%. 1000 milliLiter(s) (100 mL/Hr) IV Continuous <Continuous>  dextrose 50% Injectable 25 Gram(s) IV Push once  dextrose 50% Injectable 12.5 Gram(s) IV Push once  dextrose 50% Injectable 25 Gram(s) IV Push once  DULoxetine 20 milliGRAM(s) Oral daily  gabapentin 300 milliGRAM(s) Oral three times a day  glucagon  Injectable 1 milliGRAM(s) IntraMuscular once  insulin lispro (ADMELOG) corrective regimen sliding scale   SubCutaneous three times a day before meals  insulin lispro (ADMELOG) corrective regimen sliding scale   SubCutaneous at bedtime  lisinopril 20 milliGRAM(s) Oral daily  pantoprazole    Tablet 40 milliGRAM(s) Oral before breakfast    MEDICATIONS  (PRN):  acetaminophen     Tablet .. 650 milliGRAM(s) Oral every 6 hours PRN Temp greater or equal to 38C (100.4F), Mild Pain (1 - 3)  hydrALAZINE Injectable 5 milliGRAM(s) IV Push every 6 hours PRN for sbp above 160 mmhg  melatonin 3 milliGRAM(s) Oral at bedtime PRN Sleep      RADIOLOGY & ADDITIONAL TESTS:    tte pending
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
## 2021-11-27 NOTE — CHART NOTE - NSCHARTNOTEFT_GEN_A_CORE
Prior cervical imagings reviewed w/ attending. MRI C spine non contrast ordered STAT prior to procedure in AM. Patient seen and examined again. W/ prolonged neck flexion/extension, no numbness/tingling down b/l UE. Patient only c/o neck discomfort, which he has known about. D/w Dr. Sanches

## 2021-11-28 ENCOUNTER — APPOINTMENT (OUTPATIENT)
Dept: NEUROSURGERY | Facility: HOSPITAL | Age: 81
End: 2021-11-28
Payer: MEDICARE

## 2021-11-28 PROBLEM — M81.8 OSTEOPOROSIS, IDIOPATHIC: Status: ACTIVE | Noted: 2018-07-18

## 2021-11-28 PROBLEM — S22.080A: Status: ACTIVE | Noted: 2020-10-29

## 2021-11-28 PROBLEM — R26.2 INABILITY TO WALK: Status: ACTIVE | Noted: 2021-11-28

## 2021-11-28 PROBLEM — M35.00 SJOGREN'S SYNDROME: Status: ACTIVE | Noted: 2017-01-30

## 2021-11-28 PROBLEM — M15.9 OSTEOARTHRITIS, MULTIPLE SITES: Status: ACTIVE | Noted: 2018-07-18

## 2021-11-28 PROBLEM — M54.42 CHRONIC BILATERAL LOW BACK PAIN WITH BILATERAL SCIATICA: Status: ACTIVE | Noted: 2020-01-13

## 2021-11-28 PROBLEM — Z51.81 ENCOUNTER FOR MONITORING BISPHOSPHONATE THERAPY: Status: ACTIVE | Noted: 2020-10-29

## 2021-11-28 PROBLEM — M79.10 MYALGIA: Status: ACTIVE | Noted: 2021-02-11

## 2021-11-28 LAB
ALBUMIN SERPL ELPH-MCNC: 4 G/DL — SIGNIFICANT CHANGE UP (ref 3.3–5.2)
ALP SERPL-CCNC: 57 U/L — SIGNIFICANT CHANGE UP (ref 40–120)
ALT FLD-CCNC: 24 U/L — SIGNIFICANT CHANGE UP
ANION GAP SERPL CALC-SCNC: 16 MMOL/L — SIGNIFICANT CHANGE UP (ref 5–17)
APTT BLD: 26.4 SEC — LOW (ref 27.5–35.5)
APTT BLD: 28.9 SEC — SIGNIFICANT CHANGE UP (ref 27.5–35.5)
AST SERPL-CCNC: 22 U/L — SIGNIFICANT CHANGE UP
BASOPHILS # BLD AUTO: 0.01 K/UL — SIGNIFICANT CHANGE UP (ref 0–0.2)
BASOPHILS NFR BLD AUTO: 0.1 % — SIGNIFICANT CHANGE UP (ref 0–2)
BILIRUB SERPL-MCNC: 0.4 MG/DL — SIGNIFICANT CHANGE UP (ref 0.4–2)
BUN SERPL-MCNC: 19.9 MG/DL — SIGNIFICANT CHANGE UP (ref 8–20)
CALCIUM SERPL-MCNC: 8.8 MG/DL — SIGNIFICANT CHANGE UP (ref 8.6–10.2)
CHLORIDE SERPL-SCNC: 96 MMOL/L — LOW (ref 98–107)
CO2 SERPL-SCNC: 24 MMOL/L — SIGNIFICANT CHANGE UP (ref 22–29)
COVID-19 NUCLEOCAPSID GAM AB INTERP: NEGATIVE — SIGNIFICANT CHANGE UP
COVID-19 NUCLEOCAPSID TOTAL GAM ANTIBODY RESULT: 0.08 INDEX — SIGNIFICANT CHANGE UP
COVID-19 SPIKE DOMAIN AB INTERP: POSITIVE
COVID-19 SPIKE DOMAIN ANTIBODY RESULT: >250 U/ML — HIGH
CREAT SERPL-MCNC: 0.44 MG/DL — LOW (ref 0.5–1.3)
EOSINOPHIL # BLD AUTO: 0 K/UL — SIGNIFICANT CHANGE UP (ref 0–0.5)
EOSINOPHIL NFR BLD AUTO: 0 % — SIGNIFICANT CHANGE UP (ref 0–6)
GLUCOSE BLDC GLUCOMTR-MCNC: 154 MG/DL — HIGH (ref 70–99)
GLUCOSE BLDC GLUCOMTR-MCNC: 183 MG/DL — HIGH (ref 70–99)
GLUCOSE SERPL-MCNC: 178 MG/DL — HIGH (ref 70–99)
HCT VFR BLD CALC: 32.6 % — LOW (ref 39–50)
HGB BLD-MCNC: 11.3 G/DL — LOW (ref 13–17)
IMM GRANULOCYTES NFR BLD AUTO: 0.4 % — SIGNIFICANT CHANGE UP (ref 0–1.5)
INR BLD: 1.1 RATIO — SIGNIFICANT CHANGE UP (ref 0.88–1.16)
INR BLD: 1.13 RATIO — SIGNIFICANT CHANGE UP (ref 0.88–1.16)
LYMPHOCYTES # BLD AUTO: 0.56 K/UL — LOW (ref 1–3.3)
LYMPHOCYTES # BLD AUTO: 5.5 % — LOW (ref 13–44)
MAGNESIUM SERPL-MCNC: 1.6 MG/DL — SIGNIFICANT CHANGE UP (ref 1.6–2.6)
MCHC RBC-ENTMCNC: 32.3 PG — SIGNIFICANT CHANGE UP (ref 27–34)
MCHC RBC-ENTMCNC: 34.7 GM/DL — SIGNIFICANT CHANGE UP (ref 32–36)
MCV RBC AUTO: 93.1 FL — SIGNIFICANT CHANGE UP (ref 80–100)
MONOCYTES # BLD AUTO: 0.39 K/UL — SIGNIFICANT CHANGE UP (ref 0–0.9)
MONOCYTES NFR BLD AUTO: 3.8 % — SIGNIFICANT CHANGE UP (ref 2–14)
NEUTROPHILS # BLD AUTO: 9.26 K/UL — HIGH (ref 1.8–7.4)
NEUTROPHILS NFR BLD AUTO: 90.2 % — HIGH (ref 43–77)
PHOSPHATE SERPL-MCNC: 3.6 MG/DL — SIGNIFICANT CHANGE UP (ref 2.4–4.7)
PLATELET # BLD AUTO: 322 K/UL — SIGNIFICANT CHANGE UP (ref 150–400)
POTASSIUM SERPL-MCNC: 3.8 MMOL/L — SIGNIFICANT CHANGE UP (ref 3.5–5.3)
POTASSIUM SERPL-SCNC: 3.8 MMOL/L — SIGNIFICANT CHANGE UP (ref 3.5–5.3)
PROT SERPL-MCNC: 6.5 G/DL — LOW (ref 6.6–8.7)
PROTHROM AB SERPL-ACNC: 12.7 SEC — SIGNIFICANT CHANGE UP (ref 10.6–13.6)
PROTHROM AB SERPL-ACNC: 13 SEC — SIGNIFICANT CHANGE UP (ref 10.6–13.6)
RBC # BLD: 3.5 M/UL — LOW (ref 4.2–5.8)
RBC # FLD: 13.6 % — SIGNIFICANT CHANGE UP (ref 10.3–14.5)
SARS-COV-2 IGG+IGM SERPL QL IA: 0.08 INDEX — SIGNIFICANT CHANGE UP
SARS-COV-2 IGG+IGM SERPL QL IA: >250 U/ML — HIGH
SARS-COV-2 IGG+IGM SERPL QL IA: NEGATIVE — SIGNIFICANT CHANGE UP
SARS-COV-2 IGG+IGM SERPL QL IA: POSITIVE
SODIUM SERPL-SCNC: 136 MMOL/L — SIGNIFICANT CHANGE UP (ref 135–145)
WBC # BLD: 10.26 K/UL — SIGNIFICANT CHANGE UP (ref 3.8–10.5)
WBC # FLD AUTO: 10.26 K/UL — SIGNIFICANT CHANGE UP (ref 3.8–10.5)

## 2021-11-28 PROCEDURE — 99291 CRITICAL CARE FIRST HOUR: CPT

## 2021-11-28 PROCEDURE — 63046 LAM FACETEC & FORAMOT THRC: CPT | Mod: AS

## 2021-11-28 PROCEDURE — 63046 LAM FACETEC & FORAMOT THRC: CPT

## 2021-11-28 RX ORDER — DULOXETINE HYDROCHLORIDE 20 MG/1
20 CAPSULE, DELAYED RELEASE PELLETS ORAL
Qty: 180 | Refills: 0 | Status: DISCONTINUED | COMMUNITY
End: 2021-11-28

## 2021-11-28 RX ORDER — DEXAMETHASONE 0.5 MG/5ML
4 ELIXIR ORAL EVERY 6 HOURS
Refills: 0 | Status: DISCONTINUED | OUTPATIENT
Start: 2021-11-28 | End: 2021-12-01

## 2021-11-28 RX ORDER — CEFAZOLIN SODIUM 1 G
2000 VIAL (EA) INJECTION EVERY 8 HOURS
Refills: 0 | Status: DISCONTINUED | OUTPATIENT
Start: 2021-11-28 | End: 2021-12-01

## 2021-11-28 RX ORDER — OXYCODONE HYDROCHLORIDE 5 MG/1
5 TABLET ORAL EVERY 6 HOURS
Refills: 0 | Status: DISCONTINUED | OUTPATIENT
Start: 2021-11-28 | End: 2021-12-03

## 2021-11-28 RX ORDER — POTASSIUM CHLORIDE 20 MEQ
10 PACKET (EA) ORAL
Refills: 0 | Status: COMPLETED | OUTPATIENT
Start: 2021-11-28 | End: 2021-11-28

## 2021-11-28 RX ORDER — GABAPENTIN 400 MG/1
400 CAPSULE ORAL
Qty: 90 | Refills: 0 | Status: ACTIVE | COMMUNITY

## 2021-11-28 RX ORDER — CEFAZOLIN SODIUM 1 G
2000 VIAL (EA) INJECTION EVERY 8 HOURS
Refills: 0 | Status: DISCONTINUED | OUTPATIENT
Start: 2021-11-28 | End: 2021-11-28

## 2021-11-28 RX ORDER — INSULIN LISPRO 100/ML
VIAL (ML) SUBCUTANEOUS
Refills: 0 | Status: DISCONTINUED | OUTPATIENT
Start: 2021-11-28 | End: 2021-12-03

## 2021-11-28 RX ORDER — PREDNISONE 10 MG/1
10 TABLET ORAL
Qty: 35 | Refills: 0 | Status: DISCONTINUED | COMMUNITY
Start: 2021-08-24 | End: 2021-11-28

## 2021-11-28 RX ORDER — SODIUM CHLORIDE 9 MG/ML
1000 INJECTION INTRAMUSCULAR; INTRAVENOUS; SUBCUTANEOUS
Refills: 0 | Status: DISCONTINUED | OUTPATIENT
Start: 2021-11-28 | End: 2021-12-01

## 2021-11-28 RX ORDER — DULOXETINE HYDROCHLORIDE 20 MG/1
20 CAPSULE, DELAYED RELEASE PELLETS ORAL
Qty: 30 | Refills: 2 | Status: ACTIVE | COMMUNITY

## 2021-11-28 RX ORDER — SODIUM CHLORIDE 9 MG/ML
250 INJECTION INTRAMUSCULAR; INTRAVENOUS; SUBCUTANEOUS ONCE
Refills: 0 | Status: COMPLETED | OUTPATIENT
Start: 2021-11-28 | End: 2021-11-28

## 2021-11-28 RX ORDER — MAGNESIUM SULFATE 500 MG/ML
2 VIAL (ML) INJECTION ONCE
Refills: 0 | Status: COMPLETED | OUTPATIENT
Start: 2021-11-28 | End: 2021-11-28

## 2021-11-28 RX ORDER — SENNA PLUS 8.6 MG/1
2 TABLET ORAL AT BEDTIME
Refills: 0 | Status: DISCONTINUED | OUTPATIENT
Start: 2021-11-28 | End: 2021-12-03

## 2021-11-28 RX ORDER — OXYCODONE HYDROCHLORIDE 5 MG/1
10 TABLET ORAL EVERY 6 HOURS
Refills: 0 | Status: DISCONTINUED | OUTPATIENT
Start: 2021-11-28 | End: 2021-12-03

## 2021-11-28 RX ORDER — POLYETHYLENE GLYCOL 3350 17 G/17G
17 POWDER, FOR SOLUTION ORAL DAILY
Refills: 0 | Status: DISCONTINUED | OUTPATIENT
Start: 2021-11-28 | End: 2021-12-03

## 2021-11-28 RX ORDER — ACETAMINOPHEN 500 MG
1000 TABLET ORAL ONCE
Refills: 0 | Status: DISCONTINUED | OUTPATIENT
Start: 2021-11-28 | End: 2021-12-03

## 2021-11-28 RX ORDER — GABAPENTIN 400 MG/1
400 CAPSULE ORAL
Qty: 270 | Refills: 0 | Status: DISCONTINUED | COMMUNITY
Start: 2021-08-24 | End: 2021-11-28

## 2021-11-28 RX ORDER — SODIUM CHLORIDE 9 MG/ML
1000 INJECTION INTRAMUSCULAR; INTRAVENOUS; SUBCUTANEOUS
Refills: 0 | Status: DISCONTINUED | OUTPATIENT
Start: 2021-11-28 | End: 2021-11-28

## 2021-11-28 RX ORDER — PHENYLEPHRINE HYDROCHLORIDE 10 MG/ML
0.1 INJECTION INTRAVENOUS
Qty: 40 | Refills: 0 | Status: DISCONTINUED | OUTPATIENT
Start: 2021-11-28 | End: 2021-11-29

## 2021-11-28 RX ADMIN — Medication 1: at 16:53

## 2021-11-28 RX ADMIN — Medication 50 GRAM(S): at 14:43

## 2021-11-28 RX ADMIN — SODIUM CHLORIDE 500 MILLILITER(S): 9 INJECTION INTRAMUSCULAR; INTRAVENOUS; SUBCUTANEOUS at 19:00

## 2021-11-28 RX ADMIN — Medication 100 MILLIEQUIVALENT(S): at 15:56

## 2021-11-28 RX ADMIN — Medication 4 MILLIGRAM(S): at 05:45

## 2021-11-28 RX ADMIN — SODIUM CHLORIDE 500 MILLILITER(S): 9 INJECTION INTRAMUSCULAR; INTRAVENOUS; SUBCUTANEOUS at 14:15

## 2021-11-28 RX ADMIN — Medication 100 MILLIEQUIVALENT(S): at 16:54

## 2021-11-28 RX ADMIN — SODIUM CHLORIDE 50 MILLILITER(S): 9 INJECTION INTRAMUSCULAR; INTRAVENOUS; SUBCUTANEOUS at 19:00

## 2021-11-28 RX ADMIN — ATENOLOL 50 MILLIGRAM(S): 25 TABLET ORAL at 05:44

## 2021-11-28 RX ADMIN — Medication 100 MILLIGRAM(S): at 21:14

## 2021-11-28 RX ADMIN — Medication 100 MILLIGRAM(S): at 17:07

## 2021-11-28 RX ADMIN — Medication 4 MILLIGRAM(S): at 18:00

## 2021-11-28 RX ADMIN — LISINOPRIL 20 MILLIGRAM(S): 2.5 TABLET ORAL at 05:45

## 2021-11-28 RX ADMIN — PHENYLEPHRINE HYDROCHLORIDE 2.72 MICROGRAM(S)/KG/MIN: 10 INJECTION INTRAVENOUS at 21:14

## 2021-11-28 RX ADMIN — ATORVASTATIN CALCIUM 80 MILLIGRAM(S): 80 TABLET, FILM COATED ORAL at 21:15

## 2021-11-28 RX ADMIN — SODIUM CHLORIDE 75 MILLILITER(S): 9 INJECTION INTRAMUSCULAR; INTRAVENOUS; SUBCUTANEOUS at 19:08

## 2021-11-28 RX ADMIN — GABAPENTIN 300 MILLIGRAM(S): 400 CAPSULE ORAL at 05:44

## 2021-11-28 RX ADMIN — AMLODIPINE BESYLATE 5 MILLIGRAM(S): 2.5 TABLET ORAL at 05:45

## 2021-11-28 RX ADMIN — Medication 1: at 21:14

## 2021-11-28 RX ADMIN — GABAPENTIN 300 MILLIGRAM(S): 400 CAPSULE ORAL at 21:15

## 2021-11-28 RX ADMIN — GABAPENTIN 300 MILLIGRAM(S): 400 CAPSULE ORAL at 15:40

## 2021-11-28 RX ADMIN — Medication 100 MILLIEQUIVALENT(S): at 18:00

## 2021-11-28 RX ADMIN — PANTOPRAZOLE SODIUM 40 MILLIGRAM(S): 20 TABLET, DELAYED RELEASE ORAL at 05:44

## 2021-11-28 RX ADMIN — Medication 100 MILLIGRAM(S): at 09:30

## 2021-11-28 NOTE — BRIEF OPERATIVE NOTE - NSICDXBRIEFPROCEDURE_GEN_ALL_CORE_FT
PROCEDURES:  Laminectomy or decompressive laminectomy, spine, thoracic, 2 levels 28-Nov-2021 14:33:51  Vini Zacarias  Thoracic discectomy 28-Nov-2021 14:34:37  Vini Zacarias

## 2021-11-28 NOTE — CHART NOTE - NSCHARTNOTEFT_GEN_A_CORE
patient seen in pacu. spoke to rn and will be transferred to ICU    will see once downgraded from ICU  plan as per neurosurgery

## 2021-11-28 NOTE — REASON FOR VISIT
[Home] : at home, [unfilled] , at the time of the visit. [Medical Office: ([facility])___] : at the medical office located in  [Verbal consent obtained from patient] : the patient, [unfilled] [Follow-Up: _____] : a [unfilled] follow-up visit [Spouse] : spouse
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
## 2021-11-28 NOTE — CONSULT LETTER
[Dear  ___] : Dear  [unfilled], [Consult Letter:] : I had the pleasure of evaluating your patient, [unfilled]. [Please see my note below.] : Please see my note below. [Consult Closing:] : Thank you very much for allowing me to participate in the care of this patient.  If you have any questions, please do not hesitate to contact me. [Sincerely,] : Sincerely, [DrChriss  ___] : Dr. BRADY [DrChriss ___] : Dr. BRADY [FreeTextEntry3] : Arthur\par Myron I. Kleiner, M.D., FACR \par Chief, Division of Rheumatology\par Department of Medicine \par [facility]

## 2021-11-28 NOTE — REVIEW OF SYSTEMS
[Limb Weakness] : limb weakness [Difficulty Walking] : difficulty walking [As Noted in HPI] : as noted in HPI [Negative] : Heme/Lymph [Feeling Tired] : not feeling tired [Incontinence] : no incontinence

## 2021-11-28 NOTE — ADDENDUM
[FreeTextEntry1] : I, Danuta Frances, acted solely as a scribe for Dr. Myron I. Kleiner, MD. on 11/24/2021 .\par \par All medical record entries made by the Scribe were at Dr. Myron I. Kleiner, MD, direction and personally dictated by me on 11/24/2021. I have personally reviewed the chart and agree that the record accurately reflects my personal performance of the history, physical exam, assessment and plan.

## 2021-11-28 NOTE — BRIEF OPERATIVE NOTE - NSICDXBRIEFPOSTOP_GEN_ALL_CORE_FT
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

## 2021-11-28 NOTE — PROGRESS NOTE ADULT - SUBJECTIVE AND OBJECTIVE BOX
POST-OPERATIVE NOTE    Procedure:    Diagnosis/Indication:    Surgeon:    INTERVAL HPI/ACUTE EVENTS:  81yMale PMH admitted with now s/p ___ POD#0. Patient seen lying comfortably in bed. Denies CP, SOB, BYERS, calf tenderness. Pain controlled with medication.    VITALS:  T(C): 36.2 (11-28-21 @ 12:15), Max: 36.8 (11-27-21 @ 22:51)  HR: 49 (11-28-21 @ 15:00) (49 - 72)  BP: 133/53 (11-28-21 @ 15:00) (117/63 - 136/65)  RR: 14 (11-28-21 @ 15:00) (12 - 18)  SpO2: 99% (11-28-21 @ 15:00) (94% - 99%)  Wt(kg): --    PHYSICAL EXAM:  GENERAL: NAD, well-groomed  HEAD:  AT/NC  DRAINS:epidural drain to bulb suction. Serosanguinous drainage noted.  NECK: Dressing clean, dry, intact  WOUND: Dressing clean dry intact  REBECCA COMA SCORE: E-4 V-5 M-6 =       E: 4= opens eyes spontaneously 3= to voice 2= to noxious 1= no opening       V: 5= oriented 4= confused 3= inappropriate words 2= incomprehensible sounds 1= nonverbal 1T= intubated       M: 6= follows commands 5= localizes 4= withdraws 3= flexor posturing 2= extensor posturing 1= no movement  MENTAL STATUS: AAO x3; Awake; Opens eyes spontaneously; Appropriately conversant without aphasia; following simple commands  CRANIAL NERVES: DAMIAN. EOMI without nystagmus. Facial sensation intact V1-3 distribution b/l. Face symmetric w/ normal eye closure and smile, tongue midline. Hearing grossly intact   MOTOR: strength 5/5 b/l upper extremities  Uppers     Delt (C5/6)     Bicep (C5/6)     Wrist Extend (C6)     Tricep (C7)     HG (C8/T1)  R                     5/5                 5/5                         5/5                           5/5                   5/5  L                      5/5                 5/5                         5/5                           5/5                   5/5  Lowers      HF(L1/L2)     KE (L3)     DF (L4)     EHL (L5)     PF (S1)      R                     4+/5         5-/5           5/5           4/5            4/5  L                     4/5             5-/5          5/5         3/5            4+/5  SENSATION: grossly intact to light touch all extremities  COORDINATION: left heel to shin ataxic; no upper extremity dysmetria  CHEST/LUNG: +breath sounds bilaterally; no rales, rhonchi, wheezing, appreciated   HEART: +S1/+S2; Regular rate and rhythm; no murmurs, rubs appreciated  ABDOMEN: Soft, nontender, nondistended; bowel sounds present   EXTREMITIES: no clubbing, cyanosis, or edema  SKIN: Warm, dry;    LABS:                        11.3   10.26 )-----------( 322      ( 28 Nov 2021 12:38 )             32.6     11-28    136  |  96<L>  |  19.9  ----------------------------<  178<H>  3.8   |  24.0  |  0.44<L>    Ca    8.8      28 Nov 2021 12:38  Phos  3.6     11-28  Mg     1.6     11-28    TPro  6.5<L>  /  Alb  4.0  /  TBili  0.4  /  DBili  x   /  AST  22  /  ALT  24  /  AlkPhos  57  11-28      RADIOLOGY/OTHER:    CAPRINI SCORE [CLOT]:  Patient has an estimated Caprini score of greater than 5.  However, the patient's unique clinical situation will be addressed in an individual manner to determine appropriate anticoagulation treatment, if any. POST-OPERATIVE NOTE    Procedure: T10 to T11 laminectomy, T10 to T11 discectomy    Diagnosis/Indication: disc herniation, cord compression    Surgeon: Dr. Sanches     INTERVAL HPI/ACUTE EVENTS:  81 year old Male with PMHX of HTN, HLD, DM2, CAD s/p CABG, chronic back pain due to spinal stenosis, admitted w/ C/O inability to ambulate since September 20th, found with T10-T11 HNP causing cord compression. Pt now s/p T10 to T11 laminectomy, T10 to T11 discectomy, POD #0.     VITALS:  T(C): 36.2 (11-28-21 @ 12:15), Max: 36.8 (11-27-21 @ 22:51)  HR: 49 (11-28-21 @ 15:00) (49 - 72)  BP: 133/53 (11-28-21 @ 15:00) (117/63 - 136/65)  RR: 14 (11-28-21 @ 15:00) (12 - 18)  SpO2: 99% (11-28-21 @ 15:00) (94% - 99%)  Wt(kg): --    PHYSICAL EXAM:  GENERAL: NAD, well-groomed  HEAD:  AT/NC  DRAINS: epidural drain to bulb suction. Serosanguinous drainage noted.  NECK: Dressing clean, dry, intact  WOUND: Dressing clean dry intact  REBECCA COMA SCORE: E-4 V-5 M-6 =       E: 4= opens eyes spontaneously 3= to voice 2= to noxious 1= no opening       V: 5= oriented 4= confused 3= inappropriate words 2= incomprehensible sounds 1= nonverbal 1T= intubated       M: 6= follows commands 5= localizes 4= withdraws 3= flexor posturing 2= extensor posturing 1= no movement  MENTAL STATUS: AAO x3; Awake; Opens eyes spontaneously; Appropriately conversant without aphasia; following simple commands  CRANIAL NERVES: DAMIAN. EOMI without nystagmus. Facial sensation intact V1-3 distribution b/l. Face symmetric w/ normal eye closure and smile, tongue midline. Hearing grossly intact   MOTOR: strength 5/5 b/l upper extremities  Uppers     Delt (C5/6)     Bicep (C5/6)     Wrist Extend (C6)     Tricep (C7)     HG (C8/T1)  R                     5/5                 5/5                         5/5                           5/5                   5/5  L                      5/5                 5/5                         5/5                           5/5                   5/5  Lowers      HF(L1/L2)     KE (L3)     DF (L4)     EHL (L5)     PF (S1)      R                     4+/5         5-/5           5/5           4/5            4/5  L                     4/5             5-/5          5/5         3/5            4+/5  SENSATION: grossly intact to light touch all extremities  COORDINATION: left heel to shin ataxic; no upper extremity dysmetria  CHEST/LUNG: +breath sounds bilaterally; no rales, rhonchi, wheezing, appreciated   HEART: +S1/+S2; Regular rate and rhythm; no murmurs, rubs appreciated  ABDOMEN: Soft, nontender, nondistended; bowel sounds present   EXTREMITIES: no clubbing, cyanosis, or edema  SKIN: Warm, dry;    LABS:                        11.3   10.26 )-----------( 322      ( 28 Nov 2021 12:38 )             32.6     11-28    136  |  96<L>  |  19.9  ----------------------------<  178<H>  3.8   |  24.0  |  0.44<L>    Ca    8.8      28 Nov 2021 12:38  Phos  3.6     11-28  Mg     1.6     11-28    TPro  6.5<L>  /  Alb  4.0  /  TBili  0.4  /  DBili  x   /  AST  22  /  ALT  24  /  AlkPhos  57  11-28      RADIOLOGY/OTHER:    CAPRINI SCORE [CLOT]:  Patient has an estimated Caprini score of greater than 5.  However, the patient's unique clinical situation will be addressed in an individual manner to determine appropriate anticoagulation treatment, if any.

## 2021-11-28 NOTE — HISTORY OF PRESENT ILLNESS
[FreeTextEntry1] : 81 year-old man for followup office visit regarding his joint symptoms and rheumatic diseases, including osteoarthritis, Sjogren's syndrome, fibromyalgia, Raynaud's, osteoporosis with which compression fracture at T12, previous history of rheumatoid arthritis accompanied by his wife.\par \par Note: [employer] well\par \par Patient cannot walk or stand secondary to pain and weakness. He has pain in bilateral hips with radiation to the feet. No incontinence. He is not able to stand up from chair secondary to weakness. He is taking Gabapentin 400 mg t.i.d. and discontinued Alendronate.  He denies recent Raynaud's.  He has occasional dry eyes and dry mouth.  The patient continues calcium and vitamin D supplements. Patient denies rash or side effects with current medications. Patient is content with current medication regimen.  His wife states that she uses a Khadijah lift to move the patient from the bed to the chair.\par \par PMH:\par Neurology consult - Dr. Metz--he has an appointment December 15

## 2021-11-28 NOTE — PROGRESS NOTE ADULT - ASSESSMENT
-Pt seen and evaluated post operatively  -pain control as needed, avoid over sedation  -admit pt to NSICU w/ Q1 hr neuro checks  -LOVELY to suction  -decadron 4mg Q6hrs  -ancef Q8 until drain D/C'd   -maintain MAPs >85  -continue to follow  81 year old Male with PMHX of HTN, HLD, DM2, CAD s/p CABG, chronic back pain due to spinal stenosis, admitted w/ C/O inability to ambulate since September 20th, found with T10-T11 HNP causing cord compression. Pt now s/p T10 to T11 laminectomy, T10 to T11 discectomy, POD #0.     -Pt seen and evaluated post operatively  -pain control as needed, avoid over sedation  -admit pt to NSICU w/ Q1 hr neuro checks  -LOVELY to suction  -decadron 4mg Q6hrs  -ancef Q8 until drain D/C'd   -maintain MAPs >85  -continue to follow

## 2021-11-28 NOTE — PATIENT PROFILE ADULT - BILL PAYMENT
----- Message from Zamzam Rodriguez MD sent at 10/6/2020  4:51 PM CDT -----  Thanks Roland   Please check , if the patient started CPAP   If not started yet, please suggests that she follows up regarding this   It would be nice , if she can start CPAP by the time she comes for surgery next week Thursday   ----- Message -----  From: Roland Puri RN  Sent: 10/5/2020   3:05 PM CDT  To: Zamzam Rodriguez MD    Hi, Dr. Rodriguez  I will be covering for Diana ChowdhuryRN, Bariatric RN Navigator, while she is out on maternity leave. Attached is the Sleep Study as requested, please let me know if anything else is needed.  Thank you,  Roland      
Spoke with pt, asked if she was using the recommended CPAP from her Sleep Study results.  Patient explained it has been ordered and has to be approved by insurance.  They told her to expect it in the next 2-3 weeks. Asked pt to update us if she receives it before surgery. Patient verbalized understanding and the above reported to Dr. Rodriguez.   
no

## 2021-11-28 NOTE — PHYSICAL EXAM
[General Appearance - Alert] : alert [General Appearance - In No Acute Distress] : in no acute distress [General Appearance - Well Nourished] : well nourished [General Appearance - Well Developed] : well developed [General Appearance - Well-Appearing] : healthy appearing [Neck Appearance] : the appearance of the neck was normal [] : no respiratory distress [Oriented To Time, Place, And Person] : oriented to person, place, and time [Impaired Insight] : insight and judgment were intact [Affect] : the affect was normal [Mood] : the mood was normal [FreeTextEntry1] : Patient states he is unable to stand up

## 2021-11-28 NOTE — ASSESSMENT
[FreeTextEntry1] : Impression: 81 year-old man for followup office visit regarding his joint symptoms and rheumatic diseases, including osteoarthritis, fibromyalgia, Sjogren's syndrome, Raynaud's, osteoporosis with which compression fracture at T12, previous history of rheumatoid arthritis accompanied by his wife. \par \par Patient cannot walk or stand secondary to pain and weakness secondary to lumbar spinal stenosis and osteoarthritis. He has pain in bilateral hips with radiation to the feet. No incontinence. He is not able to stand up from chair secondary to weakness. X-rays showed osteoarthritis in multiple joints. He is taking Gabapentin 400 mg t.i.d. and discontinued Alendronate. The patient continues calcium and vitamin D supplements for his osteoporosis and wedge compression fracture T12.  He has dry eyes and dry mouth secondary to his Sjogren's syndrome.. Patient denies rash or side effects with current medications. Patient is content with current medication regimen. \par \par Of note, on previous office visits, he described weakness but it was more subjective weakness although on objective testing he did have mild weakness 4-1/2/5 of his right lower extremity probably secondary to his previous CVA.\par \par Plan: I reviewed previous lab results with patient\par Laboratory tests ordered today-see list below\par Diagnosis and prognosis discussed\par Continue other current medications (other than those changed below)\par Pain management consultation ASAP--Dr. MADHAVI Boland\par Neurology consultation [facility]--Dr. [facility]--- he already has an appointment December 15 with Dr. Metz in that office--try to move up the appointment\par  neurosurgery/spine surgery consultation--- patient's wife is aware that patient had declined in the past\par I urged him and his wife to go to  hospital emergency room today /as soon as possible to be evaluated and treated, including the above consultations--- they agreed to go to the emergency room\par Artificial tears one drop each eye q.i.d. and p.r.n.(Possible side effects explained)\par Biotin mouthwash/spray q.i.d. and p.r.n.(Possible side effects explained)\par Oral hydration\par Keep hands and feet and torso warm--patient warned of the dangers of gangrene/digital amputation with Raynaud's\par Return visit 3 months

## 2021-11-28 NOTE — BRIEF OPERATIVE NOTE - NSICDXBRIEFPREOP_GEN_ALL_CORE_FT
PRE-OP DIAGNOSIS:  Compression of spinal cord 28-Nov-2021 14:35:42  Vini Zacarias  Thoracic disc herniation 28-Nov-2021 14:36:40  Vini Zacarias

## 2021-11-28 NOTE — CONSULT NOTE ADULT - ATTENDING COMMENTS
82 yo M s/p T10- T11 laminectomy diskectomy for disc herniation and cord compression.  PMH of HTN, HLD, DM2, CAD s/p PCIs and 3V- CABG, RBBB, chronic back pain due to spinal stenosis.  TTE 12/26/2020 acceptable, with preserved LV EF.    Plan  -Admit to NSICU for frequent neuro checks and MAP requirements for perfusion  -Continue decadron4 q6  -MAP goals >80 for 48hrs, Levo PRN as pt noted to be bradycardic to 40s while in PACU  -rest as above      Pt is critically ill and at high risk of rapid deterioration/death due to abovementioned conditions.   Still requires critical care interventions - ongoing frequent evaluations, interventions and management adjustment by the Attending and ICU team, - and included review of relevant history, clinical examination, review of data and images, discussion of treatment with the multidisciplinary team and any consultants involved in this patient’s care as well as family discussion.

## 2021-11-29 LAB
ACANTHOCYTES BLD QL SMEAR: SLIGHT — SIGNIFICANT CHANGE UP
ALBUMIN SERPL ELPH-MCNC: 3.5 G/DL — SIGNIFICANT CHANGE UP (ref 3.3–5.2)
ALP SERPL-CCNC: 53 U/L — SIGNIFICANT CHANGE UP (ref 40–120)
ALT FLD-CCNC: 26 U/L — SIGNIFICANT CHANGE UP
ANION GAP SERPL CALC-SCNC: 14 MMOL/L — SIGNIFICANT CHANGE UP (ref 5–17)
AST SERPL-CCNC: 32 U/L — SIGNIFICANT CHANGE UP
BASOPHILS # BLD AUTO: 0 K/UL — SIGNIFICANT CHANGE UP (ref 0–0.2)
BASOPHILS NFR BLD AUTO: 0 % — SIGNIFICANT CHANGE UP (ref 0–2)
BILIRUB SERPL-MCNC: 0.3 MG/DL — LOW (ref 0.4–2)
BUN SERPL-MCNC: 15.4 MG/DL — SIGNIFICANT CHANGE UP (ref 8–20)
CALCIUM SERPL-MCNC: 7.6 MG/DL — LOW (ref 8.6–10.2)
CHLORIDE SERPL-SCNC: 100 MMOL/L — SIGNIFICANT CHANGE UP (ref 98–107)
CHOLEST SERPL-MCNC: 126 MG/DL — SIGNIFICANT CHANGE UP
CO2 SERPL-SCNC: 22 MMOL/L — SIGNIFICANT CHANGE UP (ref 22–29)
CREAT SERPL-MCNC: 0.45 MG/DL — LOW (ref 0.5–1.3)
EOSINOPHIL # BLD AUTO: 0 K/UL — SIGNIFICANT CHANGE UP (ref 0–0.5)
EOSINOPHIL NFR BLD AUTO: 0 % — SIGNIFICANT CHANGE UP (ref 0–6)
GIANT PLATELETS BLD QL SMEAR: PRESENT — SIGNIFICANT CHANGE UP
GLUCOSE BLDC GLUCOMTR-MCNC: 148 MG/DL — HIGH (ref 70–99)
GLUCOSE BLDC GLUCOMTR-MCNC: 170 MG/DL — HIGH (ref 70–99)
GLUCOSE BLDC GLUCOMTR-MCNC: 172 MG/DL — HIGH (ref 70–99)
GLUCOSE BLDC GLUCOMTR-MCNC: 183 MG/DL — HIGH (ref 70–99)
GLUCOSE SERPL-MCNC: 227 MG/DL — HIGH (ref 70–99)
HCT VFR BLD CALC: 30.7 % — LOW (ref 39–50)
HDLC SERPL-MCNC: 40 MG/DL — LOW
HGB BLD-MCNC: 10.5 G/DL — LOW (ref 13–17)
LIPID PNL WITH DIRECT LDL SERPL: 70 MG/DL — SIGNIFICANT CHANGE UP
LYMPHOCYTES # BLD AUTO: 0.43 K/UL — LOW (ref 1–3.3)
LYMPHOCYTES # BLD AUTO: 3.5 % — LOW (ref 13–44)
MAGNESIUM SERPL-MCNC: 1.8 MG/DL — SIGNIFICANT CHANGE UP (ref 1.6–2.6)
MANUAL SMEAR VERIFICATION: SIGNIFICANT CHANGE UP
MCHC RBC-ENTMCNC: 31.8 PG — SIGNIFICANT CHANGE UP (ref 27–34)
MCHC RBC-ENTMCNC: 34.2 GM/DL — SIGNIFICANT CHANGE UP (ref 32–36)
MCV RBC AUTO: 93 FL — SIGNIFICANT CHANGE UP (ref 80–100)
MONOCYTES # BLD AUTO: 1.92 K/UL — HIGH (ref 0–0.9)
MONOCYTES NFR BLD AUTO: 15.6 % — HIGH (ref 2–14)
NEUTROPHILS # BLD AUTO: 9.95 K/UL — HIGH (ref 1.8–7.4)
NEUTROPHILS NFR BLD AUTO: 80.9 % — HIGH (ref 43–77)
NON HDL CHOLESTEROL: 86 MG/DL — SIGNIFICANT CHANGE UP
OVALOCYTES BLD QL SMEAR: SLIGHT — SIGNIFICANT CHANGE UP
PHOSPHATE SERPL-MCNC: 2.7 MG/DL — SIGNIFICANT CHANGE UP (ref 2.4–4.7)
PLAT MORPH BLD: NORMAL — SIGNIFICANT CHANGE UP
PLATELET # BLD AUTO: 328 K/UL — SIGNIFICANT CHANGE UP (ref 150–400)
POIKILOCYTOSIS BLD QL AUTO: SLIGHT — SIGNIFICANT CHANGE UP
POTASSIUM SERPL-MCNC: 4.1 MMOL/L — SIGNIFICANT CHANGE UP (ref 3.5–5.3)
POTASSIUM SERPL-SCNC: 4.1 MMOL/L — SIGNIFICANT CHANGE UP (ref 3.5–5.3)
PROT SERPL-MCNC: 5.6 G/DL — LOW (ref 6.6–8.7)
RBC # BLD: 3.3 M/UL — LOW (ref 4.2–5.8)
RBC # FLD: 13.9 % — SIGNIFICANT CHANGE UP (ref 10.3–14.5)
RBC BLD AUTO: ABNORMAL
SODIUM SERPL-SCNC: 136 MMOL/L — SIGNIFICANT CHANGE UP (ref 135–145)
TRIGL SERPL-MCNC: 81 MG/DL — SIGNIFICANT CHANGE UP
TSH SERPL-MCNC: 0.51 UIU/ML — SIGNIFICANT CHANGE UP (ref 0.27–4.2)
WBC # BLD: 12.3 K/UL — HIGH (ref 3.8–10.5)
WBC # FLD AUTO: 12.3 K/UL — HIGH (ref 3.8–10.5)

## 2021-11-29 PROCEDURE — 99233 SBSQ HOSP IP/OBS HIGH 50: CPT

## 2021-11-29 RX ORDER — CALCIUM GLUCONATE 100 MG/ML
1 VIAL (ML) INTRAVENOUS ONCE
Refills: 0 | Status: COMPLETED | OUTPATIENT
Start: 2021-11-29 | End: 2021-11-29

## 2021-11-29 RX ORDER — MAGNESIUM SULFATE 500 MG/ML
1 VIAL (ML) INJECTION ONCE
Refills: 0 | Status: COMPLETED | OUTPATIENT
Start: 2021-11-29 | End: 2021-11-29

## 2021-11-29 RX ORDER — SODIUM,POTASSIUM PHOSPHATES 278-250MG
1 POWDER IN PACKET (EA) ORAL ONCE
Refills: 0 | Status: COMPLETED | OUTPATIENT
Start: 2021-11-29 | End: 2021-11-29

## 2021-11-29 RX ADMIN — Medication 1: at 12:05

## 2021-11-29 RX ADMIN — ATORVASTATIN CALCIUM 80 MILLIGRAM(S): 80 TABLET, FILM COATED ORAL at 21:19

## 2021-11-29 RX ADMIN — Medication 4 MILLIGRAM(S): at 23:46

## 2021-11-29 RX ADMIN — Medication 4 MILLIGRAM(S): at 12:06

## 2021-11-29 RX ADMIN — Medication 100 MILLIGRAM(S): at 22:49

## 2021-11-29 RX ADMIN — Medication 100 GRAM(S): at 07:15

## 2021-11-29 RX ADMIN — Medication 1 PACKET(S): at 07:15

## 2021-11-29 RX ADMIN — PANTOPRAZOLE SODIUM 40 MILLIGRAM(S): 20 TABLET, DELAYED RELEASE ORAL at 05:02

## 2021-11-29 RX ADMIN — DULOXETINE HYDROCHLORIDE 20 MILLIGRAM(S): 30 CAPSULE, DELAYED RELEASE ORAL at 12:06

## 2021-11-29 RX ADMIN — Medication 100 MILLIGRAM(S): at 13:40

## 2021-11-29 RX ADMIN — OXYCODONE HYDROCHLORIDE 5 MILLIGRAM(S): 5 TABLET ORAL at 21:49

## 2021-11-29 RX ADMIN — Medication 1: at 07:31

## 2021-11-29 RX ADMIN — OXYCODONE HYDROCHLORIDE 5 MILLIGRAM(S): 5 TABLET ORAL at 21:19

## 2021-11-29 RX ADMIN — Medication 4 MILLIGRAM(S): at 05:00

## 2021-11-29 RX ADMIN — Medication 4 MILLIGRAM(S): at 00:16

## 2021-11-29 RX ADMIN — Medication 1: at 21:18

## 2021-11-29 RX ADMIN — GABAPENTIN 300 MILLIGRAM(S): 400 CAPSULE ORAL at 21:19

## 2021-11-29 RX ADMIN — GABAPENTIN 300 MILLIGRAM(S): 400 CAPSULE ORAL at 05:01

## 2021-11-29 RX ADMIN — GABAPENTIN 300 MILLIGRAM(S): 400 CAPSULE ORAL at 13:40

## 2021-11-29 RX ADMIN — Medication 100 MILLIGRAM(S): at 05:01

## 2021-11-29 RX ADMIN — Medication 4 MILLIGRAM(S): at 19:15

## 2021-11-29 NOTE — PROGRESS NOTE ADULT - ASSESSMENT
81 year old Male with PMHX of HTN, HLD, DM2, CAD s/p CABG, chronic back pain due to spinal stenosis, admitted w/ C/O inability to ambulate since September 20th, found with T10-T11 HNP causing cord compression. Pt now s/p T10 to T11 laminectomy, T10 to T11 discectomy, POD #1.     -Pt seen and evaluated post operatively  -pain control as needed, avoid over sedation  -Medical care per NSICU  -LOVELY to suction  -decadron 4mg Q6hrs  -ancef Q8 until drain D/C'd   -maintain MAPs >85

## 2021-11-29 NOTE — PROGRESS NOTE ADULT - SUBJECTIVE AND OBJECTIVE BOX
HPI:   81 year old Male with HTN, HLD, DM2, CAD s/p CABG, chronic back pain due to spinal stenosis, admitted w/ C/O inability to ambulate since September 20th, found with T10-T11 HNP causing cord compression.     Interval Events:  Now POD #1 from T10 to T11 laminectomy, T10 to T11 discectomy.     PHYSICAL EXAM:  GENERAL: NAD, well-groomed  HEAD:  AT/NC  DRAINS: epidural drain to bulb suction. Serosanguinous drainage noted.  NECK: Dressing clean, dry, intact  WOUND: Dressing clean dry intact  MENTAL STATUS: AAO x3; Awake; Opens eyes spontaneously; Appropriately conversant without aphasia; following simple commands  CRANIAL NERVES: DAMIAN. EOMI without nystagmus. Facial sensation intact V1-3 distribution b/l. Face symmetric w/ normal eye closure and smile, tongue midline. Hearing grossly intact   MOTOR: strength 5/5 b/l upper extremities  Uppers     Delt (C5/6)     Bicep (C5/6)     Wrist Extend (C6)     Tricep (C7)     HG (C8/T1)  R                     5/5                 5/5                         5/5                           5/5                   5/5  L                      5/5                 5/5                         5/5                           5/5                   5/5  Lowers      HF(L1/L2)     KE (L3)     DF (L4)     EHL (L5)     PF (S1)      R                    5/5         5/5           5/5           5/5            5/5  L                     4+/5      4+/5          4+/5         4+/5           4+/5  SENSATION: grossly intact to light touch all extremities  COORDINATION: left heel to shin ataxic; no upper extremity dysmetria  CHEST/LUNG: +breath sounds bilaterally; no rales, rhonchi, wheezing, appreciated   HEART: +S1/+S2; Regular rate and rhythm; no murmurs, rubs appreciated  ABDOMEN: Soft, nontender, nondistended; bowel sounds present   EXTREMITIES: no clubbing, cyanosis, or edema  SKIN: Warm, dry;    Vitals:  T(C): 36.3 (29 Nov 2021 00:00), Max: 36.6 (28 Nov 2021 20:00)  T(F): 97.4 (29 Nov 2021 00:00), Max: 97.9 (28 Nov 2021 20:00)  HR: 54 (29 Nov 2021 03:00) (48 - 72)  BP: 132/58 (29 Nov 2021 03:00) (95/39 - 137/44)  BP(mean): 76 (29 Nov 2021 03:00) (53 - 81)  RR: 16 (29 Nov 2021 03:00) (11 - 19)  SpO2: 96% (29 Nov 2021 03:00) (94% - 99%)    I&O:  28 Nov 2021 07:01  -  29 Nov 2021 03:32  --------------------------------------------------------  IN: 1376.2 mL / OUT: 1480 mL / NET: -103.8 mL    Labs:              11.3   10.26 )-----------( 322      ( 28 Nov 2021 12:38 )             32.6     136  |  96<L>  |  19.9  ----------------------------<  178<H>  3.8   |  24.0  |  0.44<L>    Ca    8.8      28 Nov 2021 12:38  Phos  3.6     11-28  Mg     1.6     11-28    TPro  6.5<L>  /  Alb  4.0  /  TBili  0.4  /  DBili  x   /  AST  22  /  ALT  24  /  AlkPhos  57  11-28  LIVER FUNCTIONS - ( 28 Nov 2021 12:38 )  Alb: 4.0 g/dL / Pro: 6.5 g/dL / ALK PHOS: 57 U/L / ALT: 24 U/L / AST: 22 U/L / GGT: x         PT/INR - ( 28 Nov 2021 12:38 )   PT: 13.0 sec;   INR: 1.13 ratio    PTT - ( 28 Nov 2021 12:38 )  PTT:26.4 sec

## 2021-11-29 NOTE — CHART NOTE - NSCHARTNOTEFT_GEN_A_CORE
81 year old Male with HTN, HLD, DM2, CAD s/p CABG, chronic back pain due to spinal stenosis, admitted w/ C/O inability to ambulate since September 20th, found with T10-T11 HNP causing cord compression.     Interval Events:  Admitted 11/26 for paraparesis and cord compression     OR 11/28  T10 to T11 laminectomy, T10 to T11 discectomy.     PHYSICAL EXAM:  GENERAL: NAD, well-groomed  HEAD:  AT/NC  DRAINS: epidural drain to bulb suction. Serosanguinous drainage noted.  WOUND: Dressing clean dry intact  MENTAL STATUS: AAO x3; Awake; Opens eyes spontaneously; Appropriately conversant without aphasia; following simple commands  MOTOR: strength 5/5 b/l upper extremities  Uppers     Delt (C5/6)     Bicep (C5/6)     Wrist Extend (C6)     Tricep (C7)     HG (C8/T1)  R                     5/5                 5/5                         5/5                           5/5                   5/5  L                      5/5                 5/5                         5/5                           5/5                   5/5  Lowers      HF(L1/L2)     KE (L3)     DF (L4)     EHL (L5)     PF (S1)      R                    5/5         5/5           5/5           5/5            5/5  L                     4+/5      4+/5          4+/5         4+/5           4+/5  SENSATION: grossly intact to light touch all extremities  COORDINATION: left heel to shin ataxic; no upper extremity dysmetria  CHEST/LUNG: +breath sounds bilaterally  HEART: +S1/+S2; Regular rate and rhythm; no murmurs  ABDOMEN: Soft, nontender, nondistended; bowel sounds present   EXTREMITIES: no clubbing, cyanosis, or edema  SKIN: Warm, dry    Vitals:  ICU Vital Signs Last 24 Hrs  T(C): 36.2 (29 Nov 2021 08:00), Max: 36.6 (28 Nov 2021 20:00)  T(F): 97.1 (29 Nov 2021 08:00), Max: 97.9 (28 Nov 2021 20:00)  HR: 60 (29 Nov 2021 14:00) (48 - 70)  BP: 131/51 (29 Nov 2021 14:00) (95/39 - 137/44)  BP(mean): 71 (29 Nov 2021 14:00) (53 - 110)  ABP: 104/81 (29 Nov 2021 11:00) (104/81 - 153/68)  ABP(mean): 91 (29 Nov 2021 11:00) (68 - 99)  RR: 19 (29 Nov 2021 14:00) (11 - 19)  SpO2: 95% (29 Nov 2021 14:00) (94% - 99%)                          10.5   12.30 )-----------( 328      ( 29 Nov 2021 04:56 )             30.7   11-29    136  |  100  |  15.4  ----------------------------<  227<H>  4.1   |  22.0  |  0.45<L>    Ca    7.6<L>      29 Nov 2021 04:56  Phos  2.7     11-29  Mg     1.8     11-29    TPro  5.6<L>  /  Alb  3.5  /  TBili  0.3<L>  /  DBili  x   /  AST  32  /  ALT  26  /  AlkPhos  53  11-29        Assessment and Plan:   · Assessment	  81 year old Male with PMHX of HTN, HLD, DM2, CAD s/p CABG, chronic back pain due to spinal stenosis, admitted w/ C/O inability to ambulate since September 20th, found with T10-T11 HNP causing cord compression. Pt now s/p T10 to T11 laminectomy, T10 to T11 discectomy, POD #1.     - downgrade to SDU   - neurosurgery to follow   - continue drain and abx 81 year old Male with HTN, HLD, DM2, CAD s/p CABG, chronic back pain due to spinal stenosis, admitted w/ C/O inability to ambulate since September 20th, found with T10-T11 HNP causing cord compression.     Interval Events:  Admitted 11/26 for paraparesis and cord compression     OR 11/28  T10 to T11 laminectomy, T10 to T11 discectomy.     PHYSICAL EXAM:  GENERAL: NAD, well-groomed  HEAD:  AT/NC  DRAINS: epidural drain to bulb suction. Serosanguinous drainage noted.  WOUND: Dressing clean dry intact  MENTAL STATUS: AAO x3; Awake; Opens eyes spontaneously; Appropriately conversant without aphasia; following simple commands  MOTOR: strength 5/5 b/l upper extremities  Uppers     Delt (C5/6)     Bicep (C5/6)     Wrist Extend (C6)     Tricep (C7)     HG (C8/T1)  R                     5/5                 5/5                         5/5                           5/5                   5/5  L                      5/5                 5/5                         5/5                           5/5                   5/5  Lowers      HF(L1/L2)     KE (L3)     DF (L4)     EHL (L5)     PF (S1)      R                    5/5         5/5           5/5           5/5            5/5  L                     4+/5      4+/5          4+/5         4+/5           4+/5  SENSATION: grossly intact to light touch all extremities  COORDINATION: left heel to shin ataxic; no upper extremity dysmetria  CHEST/LUNG: +breath sounds bilaterally  HEART: +S1/+S2; Regular rate and rhythm; no murmurs  ABDOMEN: Soft, nontender, nondistended; bowel sounds present   EXTREMITIES: no clubbing, cyanosis, or edema  SKIN: Warm, dry    Vitals:  ICU Vital Signs Last 24 Hrs  T(C): 36.2 (29 Nov 2021 08:00), Max: 36.6 (28 Nov 2021 20:00)  T(F): 97.1 (29 Nov 2021 08:00), Max: 97.9 (28 Nov 2021 20:00)  HR: 60 (29 Nov 2021 14:00) (48 - 70)  BP: 131/51 (29 Nov 2021 14:00) (95/39 - 137/44)  BP(mean): 71 (29 Nov 2021 14:00) (53 - 110)  ABP: 104/81 (29 Nov 2021 11:00) (104/81 - 153/68)  ABP(mean): 91 (29 Nov 2021 11:00) (68 - 99)  RR: 19 (29 Nov 2021 14:00) (11 - 19)  SpO2: 95% (29 Nov 2021 14:00) (94% - 99%)                          10.5   12.30 )-----------( 328      ( 29 Nov 2021 04:56 )             30.7   11-29    136  |  100  |  15.4  ----------------------------<  227<H>  4.1   |  22.0  |  0.45<L>    Ca    7.6<L>      29 Nov 2021 04:56  Phos  2.7     11-29  Mg     1.8     11-29    TPro  5.6<L>  /  Alb  3.5  /  TBili  0.3<L>  /  DBili  x   /  AST  32  /  ALT  26  /  AlkPhos  53  11-29        Assessment and Plan:   · Assessment	  81 year old Male with PMHX of HTN, HLD, DM2, CAD s/p CABG, chronic back pain due to spinal stenosis, admitted w/ C/O inability to ambulate since September 20th, found with T10-T11 HNP causing cord compression. Pt now s/p T10 to T11 laminectomy, T10 to T11 discectomy, POD #1.     - downgrade to SDU   - neurosurgery to follow   - continue drain and abx  --------------------------------------------------------    Attending's statement:     80 yo M s/p T10- T11 laminectomy diskectomy for disc herniation and cord compression.  PMH of HTN, HLD, DM2, CAD s/p PCIs and 3V- CABG, RBBB, chronic back pain due to spinal stenosis.  TTE 12/26/2020 acceptable, with preserved LV EF.    Plan  -stable to be downgraded to SDU  -Continue decadron4 q6, consider taper per NSGy  -MAP goals - liberalized to normotension  -rest as above      Time spent 35 min (non-crit),   included review of relevant history, clinical examination, review of data and images, discussion of treatment with the multidisciplinary team and any consultants involved in this patient’s care as well as family discussion.

## 2021-11-30 LAB
ANION GAP SERPL CALC-SCNC: 12 MMOL/L — SIGNIFICANT CHANGE UP (ref 5–17)
BUN SERPL-MCNC: 18.4 MG/DL — SIGNIFICANT CHANGE UP (ref 8–20)
CALCIUM SERPL-MCNC: 7.9 MG/DL — LOW (ref 8.6–10.2)
CHLORIDE SERPL-SCNC: 102 MMOL/L — SIGNIFICANT CHANGE UP (ref 98–107)
CO2 SERPL-SCNC: 24 MMOL/L — SIGNIFICANT CHANGE UP (ref 22–29)
CREAT SERPL-MCNC: 0.54 MG/DL — SIGNIFICANT CHANGE UP (ref 0.5–1.3)
GLUCOSE BLDC GLUCOMTR-MCNC: 167 MG/DL — HIGH (ref 70–99)
GLUCOSE BLDC GLUCOMTR-MCNC: 176 MG/DL — HIGH (ref 70–99)
GLUCOSE BLDC GLUCOMTR-MCNC: 191 MG/DL — HIGH (ref 70–99)
GLUCOSE BLDC GLUCOMTR-MCNC: 221 MG/DL — HIGH (ref 70–99)
GLUCOSE SERPL-MCNC: 168 MG/DL — HIGH (ref 70–99)
HCT VFR BLD CALC: 29.8 % — LOW (ref 39–50)
HGB BLD-MCNC: 10 G/DL — LOW (ref 13–17)
MAGNESIUM SERPL-MCNC: 1.9 MG/DL — SIGNIFICANT CHANGE UP (ref 1.6–2.6)
MCHC RBC-ENTMCNC: 31.7 PG — SIGNIFICANT CHANGE UP (ref 27–34)
MCHC RBC-ENTMCNC: 33.6 GM/DL — SIGNIFICANT CHANGE UP (ref 32–36)
MCV RBC AUTO: 94.6 FL — SIGNIFICANT CHANGE UP (ref 80–100)
PHOSPHATE SERPL-MCNC: 2.7 MG/DL — SIGNIFICANT CHANGE UP (ref 2.4–4.7)
PLATELET # BLD AUTO: 251 K/UL — SIGNIFICANT CHANGE UP (ref 150–400)
POTASSIUM SERPL-MCNC: 4.3 MMOL/L — SIGNIFICANT CHANGE UP (ref 3.5–5.3)
POTASSIUM SERPL-SCNC: 4.3 MMOL/L — SIGNIFICANT CHANGE UP (ref 3.5–5.3)
RBC # BLD: 3.15 M/UL — LOW (ref 4.2–5.8)
RBC # FLD: 14.1 % — SIGNIFICANT CHANGE UP (ref 10.3–14.5)
SODIUM SERPL-SCNC: 138 MMOL/L — SIGNIFICANT CHANGE UP (ref 135–145)
WBC # BLD: 9.3 K/UL — SIGNIFICANT CHANGE UP (ref 3.8–10.5)
WBC # FLD AUTO: 9.3 K/UL — SIGNIFICANT CHANGE UP (ref 3.8–10.5)

## 2021-11-30 RX ADMIN — PANTOPRAZOLE SODIUM 40 MILLIGRAM(S): 20 TABLET, DELAYED RELEASE ORAL at 05:31

## 2021-11-30 RX ADMIN — GABAPENTIN 300 MILLIGRAM(S): 400 CAPSULE ORAL at 05:31

## 2021-11-30 RX ADMIN — GABAPENTIN 300 MILLIGRAM(S): 400 CAPSULE ORAL at 21:24

## 2021-11-30 RX ADMIN — Medication 4 MILLIGRAM(S): at 12:38

## 2021-11-30 RX ADMIN — Medication 100 MILLIGRAM(S): at 05:32

## 2021-11-30 RX ADMIN — DULOXETINE HYDROCHLORIDE 20 MILLIGRAM(S): 30 CAPSULE, DELAYED RELEASE ORAL at 12:33

## 2021-11-30 RX ADMIN — POLYETHYLENE GLYCOL 3350 17 GRAM(S): 17 POWDER, FOR SOLUTION ORAL at 12:38

## 2021-11-30 RX ADMIN — OXYCODONE HYDROCHLORIDE 10 MILLIGRAM(S): 5 TABLET ORAL at 15:42

## 2021-11-30 RX ADMIN — Medication 4 MILLIGRAM(S): at 17:29

## 2021-11-30 RX ADMIN — Medication 100 MILLIGRAM(S): at 12:38

## 2021-11-30 RX ADMIN — Medication 4 MILLIGRAM(S): at 05:32

## 2021-11-30 RX ADMIN — Medication 1: at 21:45

## 2021-11-30 RX ADMIN — Medication 100 MILLIGRAM(S): at 21:25

## 2021-11-30 RX ADMIN — Medication 2: at 17:29

## 2021-11-30 RX ADMIN — Medication 1: at 09:13

## 2021-11-30 RX ADMIN — OXYCODONE HYDROCHLORIDE 10 MILLIGRAM(S): 5 TABLET ORAL at 17:32

## 2021-11-30 RX ADMIN — ATORVASTATIN CALCIUM 80 MILLIGRAM(S): 80 TABLET, FILM COATED ORAL at 21:24

## 2021-11-30 RX ADMIN — GABAPENTIN 300 MILLIGRAM(S): 400 CAPSULE ORAL at 12:38

## 2021-11-30 RX ADMIN — SODIUM CHLORIDE 75 MILLILITER(S): 9 INJECTION INTRAMUSCULAR; INTRAVENOUS; SUBCUTANEOUS at 21:25

## 2021-11-30 RX ADMIN — Medication 1: at 12:32

## 2021-11-30 NOTE — PHYSICAL THERAPY INITIAL EVALUATION ADULT - PERTINENT HX OF CURRENT PROBLEM, REHAB EVAL
Pt is an 82 y/o male who presented with inability to ambulate over the course of a few months. (+) thoracic cord compression.

## 2021-11-30 NOTE — OCCUPATIONAL THERAPY INITIAL EVALUATION ADULT - NS ASR OT EQUIP NEEDS DISCH
ongoing assessment pending progress.  Pt already has the following equipment:  hospital bed, mechanical lift, 3 wheelchairs, RW, commode, shower seat, grab bars in tub.

## 2021-11-30 NOTE — PHYSICAL THERAPY INITIAL EVALUATION ADULT - PLANNED THERAPY INTERVENTIONS, PT EVAL
balance training/bed mobility training/gait training/neuromuscular re-education/postural re-education/transfer training

## 2021-11-30 NOTE — PHYSICAL THERAPY INITIAL EVALUATION ADULT - ACTIVE RANGE OF MOTION EXAMINATION, REHAB EVAL
uncoordinated movement/bilateral upper extremity Active ROM was WFL (within functional limits)/bilateral  lower extremity Active ROM was WFL (within functional limits)

## 2021-11-30 NOTE — OCCUPATIONAL THERAPY INITIAL EVALUATION ADULT - LEVEL OF INDEPENDENCE: DRESS LOWER BODY, OT EVAL
Pt attempts to don socks but needs assist.  Pt donned both socks with with increased time and effort from semifowler position./maximum assist (25% patients effort)

## 2021-11-30 NOTE — PHYSICAL THERAPY INITIAL EVALUATION ADULT - ADDITIONAL COMMENTS
Pt states over the past few months he has needed more and more assistance. Spouse and daughter lift pt OOB to w/c or commode. They have a ramp to enter and no stairs inside. Daughter does not live there but lives down the street. Someone is always with the pt.

## 2021-11-30 NOTE — OCCUPATIONAL THERAPY INITIAL EVALUATION ADULT - ADDITIONAL COMMENTS
Prior to September 20, 2021, pt was modified independent using SAC for mobility.  He was modified independent with all self care.  He woke up 9/20 and was suddenly unable to support himself.  His family has been assisting with all transfers and self care since then.  He has not stood up since that day - transfers only.  Pt has a hospital bed, willian lift, 3 wheelchairs, RW, bedside commode, shower chair and grab bars in tub.

## 2021-11-30 NOTE — OCCUPATIONAL THERAPY INITIAL EVALUATION ADULT - LEVEL OF INDEPENDENCE: DRESS UPPER BODY, OT EVAL
Patient admitted in active labor at term.  Epidural placed.   performed due to arrest of descent at complete/-1 station.  18-perkins catheter to be d/c at this time, nurse in room. Pt reports post-op pain, nausea, just received zofran  18-pt s/p primary LTCS. Pain controlled, ambulated, tolerating po, self-voiding  2018  Patient has met all goals for discharge. Will have lactation see patient prior to discharge.    
moderate assist (50% patients effort)

## 2021-11-30 NOTE — OCCUPATIONAL THERAPY INITIAL EVALUATION ADULT - RANGE OF MOTION EXAMINATION, UPPER EXTREMITY
history of left shoulder injury but WFL actively./bilateral UE Active ROM was WFL  (within functional limits)

## 2021-11-30 NOTE — PROGRESS NOTE ADULT - SUBJECTIVE AND OBJECTIVE BOX
INTERVAL HPI/OVERNIGHT EVENTS:  81 year old Male with PMHX of HTN, HLD, DM2, CAD s/p CABG, chronic back pain due to spinal stenosis, admitted w/ C/O inability to ambulate since September 20th, found with T10-T11 HNP causing cord compression. Pt now s/p T10 to T11 laminectomy, T10 to T11 discectomy, POD #2.       Vital Signs Last 24 Hrs  T(C): 36.7 (30 Nov 2021 19:59), Max: 36.7 (30 Nov 2021 19:59)  T(F): 98 (30 Nov 2021 19:59), Max: 98 (30 Nov 2021 19:59)  HR: 58 (30 Nov 2021 19:59) (48 - 74)  BP: 139/66 (30 Nov 2021 19:59) (128/62 - 152/74)  BP(mean): 84 (30 Nov 2021 08:00) (84 - 90)  RR: 18 (30 Nov 2021 19:59) (16 - 18)  SpO2: 98% (30 Nov 2021 19:59) (97% - 98%)        LABS:                        10.0   9.30  )-----------( 251      ( 30 Nov 2021 05:47 )             29.8     11-30    138  |  102  |  18.4  ----------------------------<  168<H>  4.3   |  24.0  |  0.54    Ca    7.9<L>      30 Nov 2021 05:47  Phos  2.7     11-30  Mg     1.9     11-30    TPro  5.6<L>  /  Alb  3.5  /  TBili  0.3<L>  /  DBili  x   /  AST  32  /  ALT  26  /  AlkPhos  53  11-29 11-29 @ 07:01  -  11-30 @ 07:00  --------------------------------------------------------  IN: 1558.1 mL / OUT: 1745 mL / NET: -186.9 mL    11-30 @ 07:01 - 11-30 @ 21:07  --------------------------------------------------------  IN: 1540 mL / OUT: 880 mL / NET: 660 mL

## 2021-11-30 NOTE — OCCUPATIONAL THERAPY INITIAL EVALUATION ADULT - PERTINENT HX OF CURRENT PROBLEM, REHAB EVAL
chronic back pain due to spinal stenosis, admitted w/ C/O inability to ambulate since September 20th, found with T10-T11 HNP causing cord compression.   Pt now POD #1 from T10 to T11 laminectomy, T10 to T11 discectomy.

## 2021-11-30 NOTE — PROGRESS NOTE ADULT - ASSESSMENT
81 year old Male with PMHX of HTN, HLD, DM2, CAD s/p CABG, chronic back pain due to spinal stenosis, admitted w/ C/O inability to ambulate since September 20th, found with T10-T11 HNP causing cord compression. Pt now s/p T10 to T11 laminectomy, T10 to T11 discectomy, POD #2.     -Pt seen and evaluated post operatively  -pain control as needed, avoid over sedation  -LOVELY to suction  -decadron 4mg Q6hrs  -ancef Q8 until drain D/C'd   -continue to follow

## 2021-12-01 LAB
GLUCOSE BLDC GLUCOMTR-MCNC: 166 MG/DL — HIGH (ref 70–99)
GLUCOSE BLDC GLUCOMTR-MCNC: 169 MG/DL — HIGH (ref 70–99)
GLUCOSE BLDC GLUCOMTR-MCNC: 175 MG/DL — HIGH (ref 70–99)
GLUCOSE BLDC GLUCOMTR-MCNC: 186 MG/DL — HIGH (ref 70–99)

## 2021-12-01 RX ORDER — DEXAMETHASONE 0.5 MG/5ML
4 ELIXIR ORAL EVERY 12 HOURS
Refills: 0 | Status: DISCONTINUED | OUTPATIENT
Start: 2021-12-04 | End: 2021-12-03

## 2021-12-01 RX ORDER — DEXAMETHASONE 0.5 MG/5ML
ELIXIR ORAL
Refills: 0 | Status: DISCONTINUED | OUTPATIENT
Start: 2021-12-01 | End: 2021-12-03

## 2021-12-01 RX ORDER — DEXAMETHASONE 0.5 MG/5ML
2 ELIXIR ORAL ONCE
Refills: 0 | Status: CANCELLED | OUTPATIENT
Start: 2021-12-08 | End: 2021-12-03

## 2021-12-01 RX ORDER — DEXAMETHASONE 0.5 MG/5ML
2 ELIXIR ORAL EVERY 12 HOURS
Refills: 0 | Status: CANCELLED | OUTPATIENT
Start: 2021-12-06 | End: 2021-12-03

## 2021-12-01 RX ORDER — DEXAMETHASONE 0.5 MG/5ML
4 ELIXIR ORAL EVERY 8 HOURS
Refills: 0 | Status: COMPLETED | OUTPATIENT
Start: 2021-12-01 | End: 2021-12-03

## 2021-12-01 RX ADMIN — DULOXETINE HYDROCHLORIDE 20 MILLIGRAM(S): 30 CAPSULE, DELAYED RELEASE ORAL at 12:35

## 2021-12-01 RX ADMIN — OXYCODONE HYDROCHLORIDE 10 MILLIGRAM(S): 5 TABLET ORAL at 21:44

## 2021-12-01 RX ADMIN — GABAPENTIN 300 MILLIGRAM(S): 400 CAPSULE ORAL at 21:44

## 2021-12-01 RX ADMIN — GABAPENTIN 300 MILLIGRAM(S): 400 CAPSULE ORAL at 06:07

## 2021-12-01 RX ADMIN — OXYCODONE HYDROCHLORIDE 10 MILLIGRAM(S): 5 TABLET ORAL at 22:30

## 2021-12-01 RX ADMIN — Medication 1: at 17:47

## 2021-12-01 RX ADMIN — Medication 1: at 21:42

## 2021-12-01 RX ADMIN — Medication 1: at 12:38

## 2021-12-01 RX ADMIN — Medication 4 MILLIGRAM(S): at 12:35

## 2021-12-01 RX ADMIN — Medication 1: at 08:34

## 2021-12-01 RX ADMIN — Medication 4 MILLIGRAM(S): at 06:07

## 2021-12-01 RX ADMIN — PANTOPRAZOLE SODIUM 40 MILLIGRAM(S): 20 TABLET, DELAYED RELEASE ORAL at 06:09

## 2021-12-01 RX ADMIN — ATORVASTATIN CALCIUM 80 MILLIGRAM(S): 80 TABLET, FILM COATED ORAL at 21:44

## 2021-12-01 RX ADMIN — GABAPENTIN 300 MILLIGRAM(S): 400 CAPSULE ORAL at 14:21

## 2021-12-01 RX ADMIN — Medication 4 MILLIGRAM(S): at 21:43

## 2021-12-01 RX ADMIN — Medication 100 MILLIGRAM(S): at 06:07

## 2021-12-01 RX ADMIN — Medication 4 MILLIGRAM(S): at 01:38

## 2021-12-01 NOTE — PROGRESS NOTE ADULT - ASSESSMENT
81y Male PMH HTN, HLD, DM2, CAD s/p CABG, chronic back pain with history of laminectomy and fusion, presented to ED due to inability to ambulate, found with T10-T11 disc herniation with cord compression, now s/p T10-T11 laminectomy and discectomy POD#3.  - Doing well postoperatively with improved strength  - Epidural LOVELY drain d/lucero today at bedside. 2 staples used to close exit site. Patient tolerated well    PLAN:  - D/w Dr. Sanches  - Ok for Q4 neuro checks, downgrade to telemetry  - Taper decadron to off over 1 week, ordered  - Pain control PRN- Tylenol/Oxy PRN  - Continue gabapentin 300 TID  - Tolerating CCB diet. On senna/miralax/protonix  - IVF d/lucero   - ISS premeal/ QHS. -221, weaning steroids now, will monitor  - Antibiotics d/lucero with drain d/c  - Hold chemical DVT ppx for now, likely start 12/2  - Labs in AM for f/u  - PT/OT following, AR candidate  - PMR ordered today

## 2021-12-01 NOTE — PROVIDER CONTACT NOTE (OTHER) - BACKGROUND
lowered patient onto the floor. this PT called for assistance, multiple floor staff members came to assist, willian pad was placed underneath patient & patient was safely lifted back into bed.

## 2021-12-02 ENCOUNTER — NON-APPOINTMENT (OUTPATIENT)
Age: 81
End: 2021-12-02

## 2021-12-02 LAB
ANION GAP SERPL CALC-SCNC: 12 MMOL/L — SIGNIFICANT CHANGE UP (ref 5–17)
BUN SERPL-MCNC: 17.7 MG/DL — SIGNIFICANT CHANGE UP (ref 8–20)
CALCIUM SERPL-MCNC: 8.2 MG/DL — LOW (ref 8.6–10.2)
CHLORIDE SERPL-SCNC: 97 MMOL/L — LOW (ref 98–107)
CO2 SERPL-SCNC: 26 MMOL/L — SIGNIFICANT CHANGE UP (ref 22–29)
CREAT SERPL-MCNC: 0.5 MG/DL — SIGNIFICANT CHANGE UP (ref 0.5–1.3)
GLUCOSE BLDC GLUCOMTR-MCNC: 157 MG/DL — HIGH (ref 70–99)
GLUCOSE BLDC GLUCOMTR-MCNC: 215 MG/DL — HIGH (ref 70–99)
GLUCOSE BLDC GLUCOMTR-MCNC: 219 MG/DL — HIGH (ref 70–99)
GLUCOSE BLDC GLUCOMTR-MCNC: 223 MG/DL — HIGH (ref 70–99)
GLUCOSE SERPL-MCNC: 153 MG/DL — HIGH (ref 70–99)
HCT VFR BLD CALC: 34.2 % — LOW (ref 39–50)
HGB BLD-MCNC: 11.5 G/DL — LOW (ref 13–17)
MAGNESIUM SERPL-MCNC: 1.9 MG/DL — SIGNIFICANT CHANGE UP (ref 1.6–2.6)
MCHC RBC-ENTMCNC: 31.9 PG — SIGNIFICANT CHANGE UP (ref 27–34)
MCHC RBC-ENTMCNC: 33.6 GM/DL — SIGNIFICANT CHANGE UP (ref 32–36)
MCV RBC AUTO: 94.7 FL — SIGNIFICANT CHANGE UP (ref 80–100)
PHOSPHATE SERPL-MCNC: 3.1 MG/DL — SIGNIFICANT CHANGE UP (ref 2.4–4.7)
PLATELET # BLD AUTO: 240 K/UL — SIGNIFICANT CHANGE UP (ref 150–400)
POTASSIUM SERPL-MCNC: 4.4 MMOL/L — SIGNIFICANT CHANGE UP (ref 3.5–5.3)
POTASSIUM SERPL-SCNC: 4.4 MMOL/L — SIGNIFICANT CHANGE UP (ref 3.5–5.3)
RBC # BLD: 3.61 M/UL — LOW (ref 4.2–5.8)
RBC # FLD: 13.5 % — SIGNIFICANT CHANGE UP (ref 10.3–14.5)
SODIUM SERPL-SCNC: 135 MMOL/L — SIGNIFICANT CHANGE UP (ref 135–145)
WBC # BLD: 10.13 K/UL — SIGNIFICANT CHANGE UP (ref 3.8–10.5)
WBC # FLD AUTO: 10.13 K/UL — SIGNIFICANT CHANGE UP (ref 3.8–10.5)

## 2021-12-02 PROCEDURE — 99222 1ST HOSP IP/OBS MODERATE 55: CPT

## 2021-12-02 RX ORDER — ENOXAPARIN SODIUM 100 MG/ML
40 INJECTION SUBCUTANEOUS AT BEDTIME
Refills: 0 | Status: DISCONTINUED | OUTPATIENT
Start: 2021-12-02 | End: 2021-12-03

## 2021-12-02 RX ADMIN — Medication 4 MILLIGRAM(S): at 21:18

## 2021-12-02 RX ADMIN — Medication 4 MILLIGRAM(S): at 13:26

## 2021-12-02 RX ADMIN — ATORVASTATIN CALCIUM 80 MILLIGRAM(S): 80 TABLET, FILM COATED ORAL at 21:19

## 2021-12-02 RX ADMIN — ENOXAPARIN SODIUM 40 MILLIGRAM(S): 100 INJECTION SUBCUTANEOUS at 21:21

## 2021-12-02 RX ADMIN — Medication 2: at 12:07

## 2021-12-02 RX ADMIN — Medication 2: at 21:17

## 2021-12-02 RX ADMIN — GABAPENTIN 300 MILLIGRAM(S): 400 CAPSULE ORAL at 06:08

## 2021-12-02 RX ADMIN — Medication 2: at 17:24

## 2021-12-02 RX ADMIN — Medication 4 MILLIGRAM(S): at 06:08

## 2021-12-02 RX ADMIN — PANTOPRAZOLE SODIUM 40 MILLIGRAM(S): 20 TABLET, DELAYED RELEASE ORAL at 06:12

## 2021-12-02 RX ADMIN — DULOXETINE HYDROCHLORIDE 20 MILLIGRAM(S): 30 CAPSULE, DELAYED RELEASE ORAL at 11:53

## 2021-12-02 RX ADMIN — GABAPENTIN 300 MILLIGRAM(S): 400 CAPSULE ORAL at 13:26

## 2021-12-02 RX ADMIN — Medication 1: at 08:24

## 2021-12-02 RX ADMIN — GABAPENTIN 300 MILLIGRAM(S): 400 CAPSULE ORAL at 21:18

## 2021-12-02 NOTE — PROGRESS NOTE ADULT - SUBJECTIVE AND OBJECTIVE BOX
INTERVAL HPI/OVERNIGHT EVENTS:  81y Male PMH HTN, HLD, DM2, CAD s/p CABG, chronic back pain with history of laminectomy and fusion, presented to ED due to inability to ambulate, found with T10-T11 disc herniation with cord compression, now s/p T10-T11 laminectomy and discectomy POD#4.   Patient seen and examined this afternoon. Patient states he's very pleasant that he came to hospital and results from Dr. Sanches.     Vital Signs Last 24 Hrs  T(C): 36.4 (02 Dec 2021 16:30), Max: 36.7 (02 Dec 2021 01:00)  T(F): 97.5 (02 Dec 2021 16:30), Max: 98.1 (02 Dec 2021 01:00)  HR: 106 (02 Dec 2021 16:30) (46 - 106)  BP: 141/98 (02 Dec 2021 16:30) (141/98 - 179/69)  BP(mean): --  RR: 18 (02 Dec 2021 16:30) (17 - 20)  SpO2: 96% (02 Dec 2021 16:30) (94% - 98%)    PHYSICAL EXAM:  GEN: NAD, well groomed  HEAD: Atraumatic normocephalic  MENTAL STATUS: Awake, alert, oriented x 3. Appropriately conversant. Following commands  CRANIAL NERVES: EOMI. Face symmetric. Hearing intact. Speech clear  MOTOR: b/l UE 5/5. b/l HF 5/5, LLE 4-/5 otherwise 5/5; RLE 5/5  SENSATION: Grossly intact to light touch  PULM: Nonlabored breathing, no respiratory distress      LABS:             11.5   10.13 )-----------( 240      ( 02 Dec 2021 08:00 )             34.2     12-02    135  |  97<L>  |  17.7  ----------------------------<  153<H>  4.4   |  26.0  |  0.50    Ca    8.2<L>      02 Dec 2021 08:00  Phos  3.1     12-02  Mg     1.9     12-02 12-01 @ 07:01  -  12-02 @ 07:00  --------------------------------------------------------  IN: 490 mL / OUT: 2100 mL / NET: -1610 mL        RADIOLOGY & ADDITIONAL TESTS:  CT Lumbar Spine No Cont (11.27.21 @ 00:35)   IMPRESSION:  Degenerative changes. Previous L2-L5 laminectomies with interposition graft and metallic fusion in good position. No periprosthetic lucency to suggest loosening or infection. Sclerosis T12, L1 and L2. Hyperostosis in the ventral epidural space L1-2. Disc herniation on the left at T10-11 seen on MRI not well identified on CT.   INTERVAL HPI/OVERNIGHT EVENTS:  81y Male PMH HTN, HLD, DM2, CAD s/p CABG, chronic back pain with history of laminectomy and fusion, presented to ED due to inability to ambulate, found with T10-T11 disc herniation with cord compression, now s/p T10-T11 laminectomy and discectomy POD#4.   Patient seen and examined this afternoon. Patient states he's very pleasant that he came to hospital and results from Dr. Sanches.     Vital Signs Last 24 Hrs  T(C): 36.4 (02 Dec 2021 16:30), Max: 36.7 (02 Dec 2021 01:00)  T(F): 97.5 (02 Dec 2021 16:30), Max: 98.1 (02 Dec 2021 01:00)  HR: 106 (02 Dec 2021 16:30) (46 - 106)  BP: 141/98 (02 Dec 2021 16:30) (141/98 - 179/69)  BP(mean): --  RR: 18 (02 Dec 2021 16:30) (17 - 20)  SpO2: 96% (02 Dec 2021 16:30) (94% - 98%)    PHYSICAL EXAM:  GEN: NAD, well groomed  HEAD: Atraumatic normocephalic  MENTAL STATUS: Awake, alert, oriented x 3. Appropriately conversant. Following commands  CRANIAL NERVES: EOMI. Face symmetric. Hearing intact. Speech clear  MOTOR: b/l UE 5/5. b/l HF 5/5, LLE 4/5 otherwise 5/5; RLE 5/5  SENSATION: Grossly intact to light touch  PULM: Nonlabored breathing, no respiratory distress      LABS:             11.5   10.13 )-----------( 240      ( 02 Dec 2021 08:00 )             34.2     12-02    135  |  97<L>  |  17.7  ----------------------------<  153<H>  4.4   |  26.0  |  0.50    Ca    8.2<L>      02 Dec 2021 08:00  Phos  3.1     12-02  Mg     1.9     12-02 12-01 @ 07:01  -  12-02 @ 07:00  --------------------------------------------------------  IN: 490 mL / OUT: 2100 mL / NET: -1610 mL        RADIOLOGY & ADDITIONAL TESTS:  CT Lumbar Spine No Cont (11.27.21 @ 00:35)   IMPRESSION:  Degenerative changes. Previous L2-L5 laminectomies with interposition graft and metallic fusion in good position. No periprosthetic lucency to suggest loosening or infection. Sclerosis T12, L1 and L2. Hyperostosis in the ventral epidural space L1-2. Disc herniation on the left at T10-11 seen on MRI not well identified on CT.

## 2021-12-02 NOTE — PROGRESS NOTE ADULT - ASSESSMENT
81M PMH HTN, HLD, DM2, CAD s/p CABG, chronic back pain with history of laminectomy and fusion, presented to ED due to inability to ambulate, found with T10-T11 disc herniation with cord compression, now s/p T10-T11 laminectomy and discectomy POD#4        Plan  - Q4 neuro checks  - Taper decadron to off over 1 week, ordered  - Pain control PRN- Tylenol/Oxy PRN  - Gabapentin 300 TID  - Tolerating CCB diet. On Senna/Miralax/Protonix  - ISS premeal/ QHS. -221, weaning steroids now, will monitor  - Antibiotics d/lucero with drain d/c  - Lovenox 40   - PT/OT following, AR candidate  - PMR ordered today  - Daughter recommending [facility] placement  - To further discuss in AM, medically optimizing for discharge   - D/w Dr. Sanches

## 2021-12-03 ENCOUNTER — TRANSCRIPTION ENCOUNTER (OUTPATIENT)
Age: 81
End: 2021-12-03

## 2021-12-03 VITALS — DIASTOLIC BLOOD PRESSURE: 63 MMHG | SYSTOLIC BLOOD PRESSURE: 148 MMHG

## 2021-12-03 DIAGNOSIS — I10 ESSENTIAL (PRIMARY) HYPERTENSION: ICD-10-CM

## 2021-12-03 DIAGNOSIS — I25.10 ATHEROSCLEROTIC HEART DISEASE OF NATIVE CORONARY ARTERY WITHOUT ANGINA PECTORIS: ICD-10-CM

## 2021-12-03 DIAGNOSIS — M51.24 OTHER INTERVERTEBRAL DISC DISPLACEMENT, THORACIC REGION: ICD-10-CM

## 2021-12-03 DIAGNOSIS — E78.5 HYPERLIPIDEMIA, UNSPECIFIED: ICD-10-CM

## 2021-12-03 DIAGNOSIS — E11.9 TYPE 2 DIABETES MELLITUS WITHOUT COMPLICATIONS: ICD-10-CM

## 2021-12-03 LAB
GLUCOSE BLDC GLUCOMTR-MCNC: 136 MG/DL — HIGH (ref 70–99)
GLUCOSE BLDC GLUCOMTR-MCNC: 211 MG/DL — HIGH (ref 70–99)
SARS-COV-2 RNA SPEC QL NAA+PROBE: SIGNIFICANT CHANGE UP

## 2021-12-03 PROCEDURE — 99232 SBSQ HOSP IP/OBS MODERATE 35: CPT

## 2021-12-03 PROCEDURE — 93306 TTE W/DOPPLER COMPLETE: CPT | Mod: 26

## 2021-12-03 PROCEDURE — 99233 SBSQ HOSP IP/OBS HIGH 50: CPT

## 2021-12-03 PROCEDURE — 99238 HOSP IP/OBS DSCHRG MGMT 30/<: CPT

## 2021-12-03 RX ORDER — AMLODIPINE BESYLATE 2.5 MG/1
1 TABLET ORAL
Qty: 30 | Refills: 0
Start: 2021-12-03 | End: 2022-01-01

## 2021-12-03 RX ORDER — ASPIRIN/CALCIUM CARB/MAGNESIUM 324 MG
81 TABLET ORAL DAILY
Refills: 0 | Status: DISCONTINUED | OUTPATIENT
Start: 2021-12-03 | End: 2021-12-03

## 2021-12-03 RX ORDER — POLYETHYLENE GLYCOL 3350 17 G/17G
17 POWDER, FOR SOLUTION ORAL DAILY
Refills: 0 | Status: DISCONTINUED | OUTPATIENT
Start: 2021-12-03 | End: 2021-12-03

## 2021-12-03 RX ORDER — POLYETHYLENE GLYCOL 3350 17 G/17G
17 POWDER, FOR SOLUTION ORAL
Qty: 0 | Refills: 0 | DISCHARGE
Start: 2021-12-03

## 2021-12-03 RX ORDER — DEXAMETHASONE 0.5 MG/5ML
2 ELIXIR ORAL
Qty: 20 | Refills: 0
Start: 2021-12-03

## 2021-12-03 RX ORDER — ATENOLOL 25 MG/1
50 TABLET ORAL DAILY
Refills: 0 | Status: DISCONTINUED | OUTPATIENT
Start: 2021-12-03 | End: 2021-12-03

## 2021-12-03 RX ORDER — OXYCODONE HYDROCHLORIDE 5 MG/1
1 TABLET ORAL
Qty: 20 | Refills: 0
Start: 2021-12-03 | End: 2021-12-07

## 2021-12-03 RX ORDER — LISINOPRIL 2.5 MG/1
20 TABLET ORAL DAILY
Refills: 0 | Status: DISCONTINUED | OUTPATIENT
Start: 2021-12-03 | End: 2021-12-03

## 2021-12-03 RX ADMIN — GABAPENTIN 300 MILLIGRAM(S): 400 CAPSULE ORAL at 05:34

## 2021-12-03 RX ADMIN — GABAPENTIN 300 MILLIGRAM(S): 400 CAPSULE ORAL at 14:05

## 2021-12-03 RX ADMIN — Medication 4 MILLIGRAM(S): at 14:07

## 2021-12-03 RX ADMIN — ATENOLOL 50 MILLIGRAM(S): 25 TABLET ORAL at 14:05

## 2021-12-03 RX ADMIN — LISINOPRIL 20 MILLIGRAM(S): 2.5 TABLET ORAL at 14:05

## 2021-12-03 RX ADMIN — Medication 4 MILLIGRAM(S): at 05:35

## 2021-12-03 RX ADMIN — DULOXETINE HYDROCHLORIDE 20 MILLIGRAM(S): 30 CAPSULE, DELAYED RELEASE ORAL at 11:40

## 2021-12-03 RX ADMIN — Medication 81 MILLIGRAM(S): at 14:05

## 2021-12-03 RX ADMIN — Medication 2: at 11:42

## 2021-12-03 RX ADMIN — PANTOPRAZOLE SODIUM 40 MILLIGRAM(S): 20 TABLET, DELAYED RELEASE ORAL at 05:35

## 2021-12-03 NOTE — DISCHARGE NOTE PROVIDER - NSDCFUADDINST_GEN_ALL_CORE_FT
out of bed w assist/ fall precaution  out of bed w assist/ fall precaution       echo cardiogram:    1. Technically difficult study.   2. Endocardial visualization was enhanced with intravenous echo contrast.   3. Normal left ventricular internal cavity size.   4. Normal global left ventricular systolic function.   5. Left ventricular ejection fraction, by visual estimation, is 60 to 65%.   6. Severely enlarged left atrium.   7. The right atrium is normal in size.   8. 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   9. Mild aortic regurgitation.  10. Mild thickening and calcification of the anterior and posterior mitral valve leaflets.  11. Mild to moderate mitral annular calcification.  12. Mild mitral valve regurgitation.    MD Lori Electronically signed on 12/3/2021 at 2:48:57 PM

## 2021-12-03 NOTE — DISCHARGE NOTE PROVIDER - HOSPITAL COURSE
81y Male PMH HTN, HLD, DM2, CAD s/p CABG, chronic back pain with history of laminectomy and fusion, presented to ED due to inability to ambulate, found with T10-T11 disc herniation with cord compression, now s/p T10-T11 laminectomy and discectomy POD#5. doing well and LEs strength improving.   pt has been seen by PT/OT and PMnR and recommended ARChriss dubose requested d/c to [facility].   Patient seen and examined this afternoon.   Patient is cooperative and glad that he came to hospital and results from Dr. Sanches.   pt has been noted to be hypertensive post-op as per recommendation to allow for spinal cord perfusion, today resumed home meds. Echo done today - per d/w Dr Ashley, pt is medically stable for discharge.  per d/w Dr Sanches - pt may resume ASA 81 today.     MEDICATIONS  (STANDING):  acetaminophen   IVPB .. 1000 milliGRAM(s) IV Intermittent once  aspirin  chewable 81 milliGRAM(s) Oral daily  ATENolol  Tablet 50 milliGRAM(s) Oral daily  atorvastatin 80 milliGRAM(s) Oral at bedtime  dexAMETHasone     Tablet   Oral   dextrose 40% Gel 15 Gram(s) Oral once  dextrose 5%. 1000 milliLiter(s) (50 mL/Hr) IV Continuous <Continuous>  dextrose 5%. 1000 milliLiter(s) (100 mL/Hr) IV Continuous <Continuous>  dextrose 50% Injectable 25 Gram(s) IV Push once  dextrose 50% Injectable 12.5 Gram(s) IV Push once  dextrose 50% Injectable 25 Gram(s) IV Push once  DULoxetine 20 milliGRAM(s) Oral daily  enoxaparin Injectable 40 milliGRAM(s) SubCutaneous at bedtime  gabapentin 300 milliGRAM(s) Oral three times a day  glucagon  Injectable 1 milliGRAM(s) IntraMuscular once  insulin lispro (ADMELOG) corrective regimen sliding scale   SubCutaneous Before meals and at bedtime  lisinopril 20 milliGRAM(s) Oral daily  pantoprazole    Tablet 40 milliGRAM(s) Oral before breakfast    MEDICATIONS  (PRN):  acetaminophen     Tablet .. 650 milliGRAM(s) Oral every 6 hours PRN Temp greater or equal to 38C (100.4F), Mild Pain (1 - 3)  melatonin 3 milliGRAM(s) Oral at bedtime PRN Sleep  oxyCODONE    IR 5 milliGRAM(s) Oral every 6 hours PRN Moderate Pain (4 - 6)  oxyCODONE    IR 10 milliGRAM(s) Oral every 6 hours PRN Severe Pain (7 - 10)  polyethylene glycol 3350 17 Gram(s) Oral daily PRN Constipation  senna 2 Tablet(s) Oral at bedtime PRN Constipation      Vital Signs Last 24 Hrs  T(C): 36.2 (03 Dec 2021 07:14), Max: 36.7 (02 Dec 2021 22:00)  T(F): 97.1 (03 Dec 2021 07:14), Max: 98.1 (02 Dec 2021 22:00)  HR: 78 (03 Dec 2021 07:14) (50 - 106)  BP: 148/63 (03 Dec 2021 12:19) (141/98 - 172/73)  BP(mean): --  RR: 18 (03 Dec 2021 07:14) (17 - 18)  SpO2: 98% (03 Dec 2021 07:14) (96% - 98%)    GEN: NAD, well groomed, cooperative   HEAD: Atraumatic normocephalic  MENTAL STATUS: Awake, alert, oriented x 3. Appropriately conversant. Following commands  CRANIAL NERVES: EOMI. Face symmetric. Hearing intact when spoken loudly - he states he does not want to wear hearing aids. Speech clear  MOTOR: b/l UE 5/5. b/l HF 5/5, LLE +4/5 otherwise 5/5; RLE 5/5 except RDF 4/5 from prior injury   SENSATION: Grossly intact to light touch  PULM: Nonlabored breathing, no respiratory distress 81y Male PMH HTN, HLD, DM2, CAD s/p CABG, chronic back pain with history of laminectomy and fusion, presented to ED due to inability to ambulate, found with T10-T11 disc herniation with cord compression, now s/p T10-T11 laminectomy and discectomy POD#5. doing well and LEs strength improving.   pt has been seen by PT/OT and PMnR and recommended ARChriss dubose requested d/c to [facility].   Patient seen and examined this afternoon.   Patient is cooperative and glad that he came to hospital and results from Dr. Sanches.   pt has been noted to be hypertensive post-op as per recommendation to allow for spinal cord perfusion, today resumed home meds. Echo done today - per d/w Dr Ashley, pt is medically stable for discharge.  per d/w Dr Sanches - pt may resume ASA 81 today.     MEDICATIONS  (STANDING):  acetaminophen   IVPB .. 1000 milliGRAM(s) IV Intermittent once  aspirin  chewable 81 milliGRAM(s) Oral daily  ATENolol  Tablet 50 milliGRAM(s) Oral daily  atorvastatin 80 milliGRAM(s) Oral at bedtime  dexAMETHasone     Tablet   Oral   dextrose 40% Gel 15 Gram(s) Oral once  dextrose 5%. 1000 milliLiter(s) (50 mL/Hr) IV Continuous <Continuous>  dextrose 5%. 1000 milliLiter(s) (100 mL/Hr) IV Continuous <Continuous>  dextrose 50% Injectable 25 Gram(s) IV Push once  dextrose 50% Injectable 12.5 Gram(s) IV Push once  dextrose 50% Injectable 25 Gram(s) IV Push once  DULoxetine 20 milliGRAM(s) Oral daily  enoxaparin Injectable 40 milliGRAM(s) SubCutaneous at bedtime  gabapentin 300 milliGRAM(s) Oral three times a day  glucagon  Injectable 1 milliGRAM(s) IntraMuscular once  insulin lispro (ADMELOG) corrective regimen sliding scale   SubCutaneous Before meals and at bedtime  lisinopril 20 milliGRAM(s) Oral daily  pantoprazole    Tablet 40 milliGRAM(s) Oral before breakfast    MEDICATIONS  (PRN):  acetaminophen     Tablet .. 650 milliGRAM(s) Oral every 6 hours PRN Temp greater or equal to 38C (100.4F), Mild Pain (1 - 3)  melatonin 3 milliGRAM(s) Oral at bedtime PRN Sleep  oxyCODONE    IR 5 milliGRAM(s) Oral every 6 hours PRN Moderate Pain (4 - 6)  oxyCODONE    IR 10 milliGRAM(s) Oral every 6 hours PRN Severe Pain (7 - 10)  polyethylene glycol 3350 17 Gram(s) Oral daily PRN Constipation  senna 2 Tablet(s) Oral at bedtime PRN Constipation      Vital Signs Last 24 Hrs  T(C): 36.2 (03 Dec 2021 07:14), Max: 36.7 (02 Dec 2021 22:00)  T(F): 97.1 (03 Dec 2021 07:14), Max: 98.1 (02 Dec 2021 22:00)  HR: 78 (03 Dec 2021 07:14) (50 - 106)  BP: 148/63 (03 Dec 2021 12:19) (141/98 - 172/73)  BP(mean): --  RR: 18 (03 Dec 2021 07:14) (17 - 18)  SpO2: 98% (03 Dec 2021 07:14) (96% - 98%)    GEN: NAD, well groomed, cooperative   HEAD: Atraumatic normocephalic  MENTAL STATUS: Awake, alert, oriented x 3. Appropriately conversant. Following commands  CRANIAL NERVES: EOMI. Face symmetric. Hearing intact when spoken loudly - he states he does not want to wear hearing aids. Speech clear  MOTOR: b/l UE 5/5. b/l HF 5/5, LLE +4/5 otherwise 5/5; RLE 5/5 except RDF 4/5 from prior injury   SENSATION: Grossly intact to light touch  PULM: Nonlabored breathing, no respiratory distress  SKIN: no rashes or lesions, small abrasions noted to LEs shins. posterior midline incision is C/D/I w staples and drain exit site is secured w staples. incision cleansed w chlorohexadine and air-dried, sterile island dressing applied. 81y Male PMH HTN, HLD, DM2, CAD s/p CABG, chronic back pain with history of laminectomy and fusion, presented to ED due to inability to ambulate, found with T10-T11 disc herniation with cord compression, now s/p T10-T11 laminectomy and discectomy POD#5. doing well and LEs strength improving.   pt has been seen by PT/OT and PMnR and recommended ARChriss dubose requested d/c to [facility].   Patient seen and examined this afternoon.   Patient is cooperative and glad that he came to hospital and results from Dr. Sanches.   pt has been noted to be hypertensive post-op as per recommendation to allow for spinal cord perfusion, today resumed home meds. Echo done today - per d/w Dr Ashley, pt is medically stable for discharge.  per d/w Dr Sanches - pt may resume ASA 81 today.     MEDICATIONS  (STANDING):  acetaminophen   IVPB .. 1000 milliGRAM(s) IV Intermittent once  aspirin  chewable 81 milliGRAM(s) Oral daily  ATENolol  Tablet 50 milliGRAM(s) Oral daily  atorvastatin 80 milliGRAM(s) Oral at bedtime  dexAMETHasone     Tablet   Oral   dextrose 40% Gel 15 Gram(s) Oral once  dextrose 5%. 1000 milliLiter(s) (50 mL/Hr) IV Continuous <Continuous>  dextrose 5%. 1000 milliLiter(s) (100 mL/Hr) IV Continuous <Continuous>  dextrose 50% Injectable 25 Gram(s) IV Push once  dextrose 50% Injectable 12.5 Gram(s) IV Push once  dextrose 50% Injectable 25 Gram(s) IV Push once  DULoxetine 20 milliGRAM(s) Oral daily  enoxaparin Injectable 40 milliGRAM(s) SubCutaneous at bedtime  gabapentin 300 milliGRAM(s) Oral three times a day  glucagon  Injectable 1 milliGRAM(s) IntraMuscular once  insulin lispro (ADMELOG) corrective regimen sliding scale   SubCutaneous Before meals and at bedtime  lisinopril 20 milliGRAM(s) Oral daily  pantoprazole    Tablet 40 milliGRAM(s) Oral before breakfast    MEDICATIONS  (PRN):  acetaminophen     Tablet .. 650 milliGRAM(s) Oral every 6 hours PRN Temp greater or equal to 38C (100.4F), Mild Pain (1 - 3)  melatonin 3 milliGRAM(s) Oral at bedtime PRN Sleep  oxyCODONE    IR 5 milliGRAM(s) Oral every 6 hours PRN Moderate Pain (4 - 6)  oxyCODONE    IR 10 milliGRAM(s) Oral every 6 hours PRN Severe Pain (7 - 10)  polyethylene glycol 3350 17 Gram(s) Oral daily PRN Constipation  senna 2 Tablet(s) Oral at bedtime PRN Constipation      Vital Signs Last 24 Hrs  T(C): 36.2 (03 Dec 2021 07:14), Max: 36.7 (02 Dec 2021 22:00)  T(F): 97.1 (03 Dec 2021 07:14), Max: 98.1 (02 Dec 2021 22:00)  HR: 78 (03 Dec 2021 07:14) (50 - 106)  BP: 148/63 (03 Dec 2021 12:19) (141/98 - 172/73)  BP(mean): --  RR: 18 (03 Dec 2021 07:14) (17 - 18)  SpO2: 98% (03 Dec 2021 07:14) (96% - 98%)    GEN: NAD, well groomed, cooperative   HEAD: Atraumatic normocephalic  MENTAL STATUS: Awake, alert, oriented x 3. Appropriately conversant. Following commands  CRANIAL NERVES: EOMI. Face symmetric. Hearing intact when spoken loudly - he states he does not want to wear hearing aids. Speech clear  MOTOR: b/l UE 5/5. b/l HF 5/5, LLE +4/5 otherwise 5/5; RLE 5/5 except RDF 4/5 from prior injury   SENSATION: Grossly intact to light touch  PULM: Nonlabored breathing, no respiratory distress  SKIN: no rashes or lesions, small abrasions noted to LEs shins. posterior midline incision is C/D/I w staples and drain exit site is secured w staples. incision cleansed w chlorohexadine and air-dried, sterile island dressing applied.     < from: MR Cervical Spine No Cont (11.27.21 @ 19:33) >    IMPRESSION:  1. Increased T2 signal is seen in anterior C6-C7 endplates.  Finding is consistent with type 1 Modic changes.  2. Trace prevertebral fluid is seen at C5-C6 level near the Modic changes described above. This finding is too subtle for definitive characterization.  Mild sprain of anterior longitudinal ligament can have this imaging finding.  Please note that early infectious pathology although unlikely cannot be excluded through this imaging study. Please correlate with clinical presentation.  3. 10 x 10 mm soft tissue density is seen in left posterior dens with significant mass effect on the canal space and upper cervical cord with moderate flattening of the cord on the left side. Finding likely represents a  large asymmetric pannus formation.  4. Chronic degenerative disc disease is seen throughout cervical spine as described above with areas of moderate canal stenosis      Final report:    Degenerative changes of the cervical spine with moderate spinal stenosis C3-4 and C4-5. 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 Bilateral neural foraminal stenosis C4. Left neural foraminal stenosis and left lateral recess stenosis C4-5 due to disc osteophyte complex.  --- End of Report ---  < end of copied text >      < from: MR Thoracic Spine No Cont (11.26.21 @ 20:18) >  IMPRESSION:  Degenerative changes at T4/T5, T7/T8, T10/T11, and T12/L1 as detailed above, most severe at T10/T11.  At T10/T11, there is a very large left paracentral disc herniation measuring approximately 0.8 cm AP x 1.5 cm TR, resulting in severe compression upon the thoracic cord with rightward deviation of the cord and severe central spinal canal stenosis.  This was discussed with Dr. Euceda of the ED.  --- End of Report ---  < end of copied text >    < from: CT Thoracic/ Lumbar Spine No Cont (11.27.21 @ 00:34) >    IMPRESSION:  Degenerative changes. Previous L2-L5 laminectomies with interposition graft and metallic fusion in good position. No periprosthetic lucency to suggest loosening or infection. Sclerosis T12, L1 and L2. Hyperostosis in the ventral epidural space L1-2. Disc herniation on the left at T10-11 seen on MRI not well identified on CT.  --- End of Report ---  < end of copied text >

## 2021-12-03 NOTE — DISCHARGE NOTE PROVIDER - CARE PROVIDER_API CALL
Se Sanches; PhD)  Neurosurgery  [address] [place] [address]  Phone: [phone]  Fax: [phone]  Follow Up Time: 2 weeks    PCP,   gray follow up w your PCP after discharge to review medications  Phone: (   )    -  Fax: (   )    -  Follow Up Time: 2 weeks

## 2021-12-03 NOTE — PROGRESS NOTE ADULT - PROVIDER SPECIALTY LIST ADULT
Cardiology
Internal Medicine
Neurosurgery
Rehab Medicine
Neurosurgery

## 2021-12-03 NOTE — CONSULT NOTE ADULT - CONSULT REASON
LE weakness/cord compression
Spinal cord injury
preop
T10- T11 laminectomy discectomy for disc herniation and cord compression
HTN

## 2021-12-03 NOTE — DISCHARGE NOTE PROVIDER - NSDCCPTREATMENT_GEN_ALL_CORE_FT
PRINCIPAL PROCEDURE  Procedure: Posterior thoracic laminectomy  Findings and Treatment:        PRINCIPAL PROCEDURE  Procedure: Posterior thoracic laminectomy  Findings and Treatment:       SECONDARY PROCEDURE  Procedure: Echocardiography, limited  Findings and Treatment:

## 2021-12-03 NOTE — DISCHARGE NOTE NURSING/CASE MANAGEMENT/SOCIAL WORK - NSDCPEFALRISK_GEN_ALL_CORE
For information on Fall & Injury Prevention, visit: [email or web address] OR  [email or web address] OR  https://www.cdc.gov/steadi/patient.html

## 2021-12-03 NOTE — PROGRESS NOTE ADULT - SUBJECTIVE AND OBJECTIVE BOX
He reports his strength continues to improve.  He reports he has control over his bowel and bladder.      REVIEW OF SYSTEMS  Constitutional - No fever,  No fatigue  HEENT - No vertigo, No neck pain  Neurological - No headaches, No memory loss, + loss of strength, No numbness, No tremors  Skin - No rashes, No lesions   Musculoskeletal - No joint pain, No joint swelling, No muscle pain  Psychiatric - No depression, No anxiety    FUNCTIONAL PROGRESS    Bed Mobility: Supine to Sit:     · Level of [place]	minimum assist (75% patients effort)  · Physical Assist/Nonphysical Assist	1 person assist    Bed Mobility Analysis:     · Bed Mobility Limitations	decreased ability to use legs for bridging/pushing  · Impairments Contributing to Impaired Bed Mobility	impaired balance; pain; decreased strength; impaired motor control    Transfer: Bed to Chair:    Bed to Chair Transfer:    Transfer Skill: Bed to Chair   · Level of [place]	moderate assist (50% patients effort); squat pivot transfer  · Physical Assist/Nonphysical Assist	1 person + 1 person to manage equipment    Transfer: Chair to Bed:     · Level of [place]	assess    Bed to Chair Safety Analysis:     · Impairments Contributing to Impaired Transfers	impaired balance; impaired motor control; pain; decreased strength; decreased ROM  · Transfer Safety Concerns Noted	decreased weight-shifting ability; squat pivot    Transfer: Sit to Stand:     · Level of [place]	assess - pt has not stood since September 20th.  Squat pivot assessed due to time constraints.        VITALS  T(C): 36.2 (12-03-21 @ 07:14), Max: 36.7 (12-02-21 @ 22:00)  HR: 78 (12-03-21 @ 07:14) (50 - 106)  BP: 151/78 (12-03-21 @ 07:14) (141/98 - 172/73)  RR: 18 (12-03-21 @ 07:14) (17 - 18)  SpO2: 98% (12-03-21 @ 07:14) (96% - 98%)  Wt(kg): --    MEDICATIONS   acetaminophen     Tablet .. 650 milliGRAM(s) every 6 hours PRN  acetaminophen   IVPB .. 1000 milliGRAM(s) once  ATENolol  Tablet 50 milliGRAM(s) daily  atorvastatin 80 milliGRAM(s) at bedtime  dexAMETHasone     Tablet     dexAMETHasone     Tablet 4 milliGRAM(s) every 8 hours  dextrose 40% Gel 15 Gram(s) once  dextrose 5%. 1000 milliLiter(s) <Continuous>  dextrose 5%. 1000 milliLiter(s) <Continuous>  dextrose 50% Injectable 25 Gram(s) once  dextrose 50% Injectable 12.5 Gram(s) once  dextrose 50% Injectable 25 Gram(s) once  DULoxetine 20 milliGRAM(s) daily  enoxaparin Injectable 40 milliGRAM(s) at bedtime  gabapentin 300 milliGRAM(s) three times a day  glucagon  Injectable 1 milliGRAM(s) once  insulin lispro (ADMELOG) corrective regimen sliding scale   Before meals and at bedtime  lisinopril 20 milliGRAM(s) daily  melatonin 3 milliGRAM(s) at bedtime PRN  oxyCODONE    IR 5 milliGRAM(s) every 6 hours PRN  oxyCODONE    IR 10 milliGRAM(s) every 6 hours PRN  pantoprazole    Tablet 40 milliGRAM(s) before breakfast  polyethylene glycol 3350 17 Gram(s) daily PRN  senna 2 Tablet(s) at bedtime PRN      RECENT LABS/IMAGING - Reviewed                        11.5   10.13 )-----------( 240      ( 02 Dec 2021 08:00 )             34.2     12-02    135  |  97<L>  |  17.7  ----------------------------<  153<H>  4.4   |  26.0  |  0.50    Ca    8.2<L>      02 Dec 2021 08:00  Phos  3.1     12-02  Mg     1.9     12-02        ----------------------------------------------------------------------------------------  PHYSICAL EXAM  Constitutional - NAD, Comfortable  HEENT - NCAT, EOMI  Neck - Supple, No limited ROM  Chest - Breathing comfortably, No wheezing  Cardiovascular - No cyanosis   Abdomen - Soft   Extremities - No C/C/E, No calf tenderness   Neurologic Exam -                    Cognitive - Awake, Alert, AAO to self, place, date, year, situation     Communication - Fluent, No dysarthria     Cranial Nerves - CN 2-12 intact     Motor - 5/5 in bilateral UE, LLE 4/5, RLE 4/5 throughout        Sensory - Intact to LT     Reflexes - No clonus, -Babinski   Psychiatric - Mood stable, Affect WNL  ----------------------------------------------------------------------------------------  ASSESSMENT/PLAN  81yMale with functional deficits after T10-T11 disc herniation with cord compression, now s/p T10-T11 laminectomy  T10-T11 disc herniation with cord compression, now s/p T10-T11 laminectomy - Decadron taper   Pain - Tylenol, gabapentin 300mg TID, oxycodone  Bowel/Program - monitor for bowel movements, Miralax, senna, consider bisacodyl suppository if no bowel movement  DVT PPX - SCDs, Lovenox   Rehab -  Continue PT/OT.  Recommend ACUTE inpatient rehabilitation for the functional deficits consisting of 3 hours of therapy/day & 24 hour RN/daily PMR physician for comorbid medical management. Patient will be able to tolerate 3 hours a day.    Will continue to follow. Functional progress will determine ongoing rehab dispo recommendations, which may change.    Continue bedside therapy as well as OOB throughout the day with mobilization by staff to maintain cardiopulmonary function and prevention of secondary complications related to debility.

## 2021-12-03 NOTE — DISCHARGE NOTE PROVIDER - PROVIDER TOKENS
PROVIDER:[TOKEN:[[id number]],FOLLOWUP:[2 weeks]],FREE:[LAST:[PCP],PHONE:[(   )    -],FAX:[(   )    -],ADDRESS:[pls follow up w your PCP after discharge to review medications],FOLLOWUP:[2 weeks]]

## 2021-12-03 NOTE — DISCHARGE NOTE PROVIDER - NSDCCPCAREPLAN_GEN_ALL_CORE_FT
PRINCIPAL DISCHARGE DIAGNOSIS  Diagnosis: S/P laminectomy  Assessment and Plan of Treatment: 2* cord compression, T10-T12 laminectomy      SECONDARY DISCHARGE DIAGNOSES  Diagnosis: Hypertension  Assessment and Plan of Treatment:     Diagnosis: H/O coronary artery bypass surgery  Assessment and Plan of Treatment:     Diagnosis: History of percutaneous angioplasty  Assessment and Plan of Treatment:

## 2021-12-03 NOTE — DISCHARGE NOTE PROVIDER - NSDCMRMEDTOKEN_GEN_ALL_CORE_FT
acetaminophen 325 mg oral tablet: 2 tab(s) orally every 8 hours  amLODIPine 5 mg oral tablet: 1 tab(s) orally once a day  Hold for SBP&lt; 120mmHg  aspirin 81 mg oral tablet, chewable: 1 tab(s) orally once a day MDD:1 tab  atenolol 50 mg oral tablet: 1 tab(s) orally once a day  atorvastatin 80 mg oral tablet: 1 tab(s) orally once a day (at bedtime)  dexamethasone: 12/3 4mg q8 hr   12/4 4mg q12hr x 2 days   12/6 2mg q12hr x 2 days   12/8 2mg q24hr x 2 days and off   DULoxetine 20 mg oral delayed release capsule: 1 cap(s) orally once a day MDD:1 cap  gabapentin 300 mg oral capsule: 1 cap(s) orally 3 times a day MDD:3 tabs  lisinopril 20 mg oral tablet: 1 tab(s) orally once a day  melatonin 3 mg oral tablet: 1 tab(s) orally once a day (at bedtime)  metFORMIN 1000 mg oral tablet: 1 tab(s) orally 2 times a day  oxyCODONE 5 mg oral tablet: 1 tab(s) orally every 6 hours, As Needed -Moderate Pain (4 - 6) - 2 tabs for for severe pain MDD:8  pantoprazole 40 mg oral delayed release tablet: 1 tab(s) orally once a day (before a meal)  polyethylene glycol 3350 oral powder for reconstitution: 17 gram(s) orally once a day, As needed, Constipation  senna oral tablet: 2 tab(s) orally once a day (at bedtime), As needed, Constipation

## 2021-12-03 NOTE — CONSULT NOTE ADULT - ASSESSMENT
80 y/o male with PMH of HTN, HLD, DM-2, CAD s/p CABG, chronic back pain due to spinal stenosis, came to the ED complaining of inability to ambulate. Patient found to have T10-T11 disc herniation with cord compression, s/p laminectomy and discectomy POD #5.  Medicine consulted for HTN.
ASSESSMENT:  81 year old Male with PMHX of HTN, HLD, DM2, CAD s/p CABG, chronic back pain due to spinal stenosis, came to the ED complaining of inability to ambulate, started in 9/2021 but in last week has worsened to unable to lift b/l LE found with T10/ T 11 disc herniation with thoracic cord compression now POD 0 for T10- T11 laminectomy diskectomy for disc herniation and cord compression.     Plan  Neuro:  -Admit to NSICU  -Neuro checks hourly  -Continue decadron4 q6  -PRN Pain control: avoid oversedation  - No additional imaging tonight    CV:  -MAP goal >80 per neurosurgery team  -Pending TTE  -Antihypertensives d/c'd at this time for MAP goal >80  -pending LDL    Respiratory:  -Supplemental NC PRN hypoxia  -PRN Duoneb for wheezing/ bronchospasm  -Incentive spirometry hourly, pulmonary toileting    GI:  -ADAT  -BM regimen : miralax/ senna PRN  -PRN zofran for nausea/ vomiting    :  -Remove rodriguez catheter 11/29 AM  -Monitor Scr  -Strict I +O    Heme:  -Hold AC/ AP   -SCDS    ID:  -Monitor for leukocytosis  -Monitor for fever    Endo:  -A1C 5.8  -pending TSH  -ISS while on decadron    Lines& Skin:  -LOVELY to half suction per neurosurgery team  
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 Sanches

## 2021-12-03 NOTE — CONSULT NOTE ADULT - SUBJECTIVE AND OBJECTIVE BOX
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 Cell Hemoglobin Concentration : 33.6 gm/dL  Auto Neutrophil # : x  Auto Lymphocyte # : x  Auto Monocyte # : x  Auto Eosinophil # : x  Auto Basophil # : x  Auto Neutrophil % : x  Auto Lymphocyte % : x  Auto Monocyte % : x  Auto Eosinophil % : x  Auto Basophil % : x    12-02    135  |  97<L>  |  17.7  ----------------------------<  153<H>  4.4   |  26.0  |  0.50    Ca    8.2<L>      02 Dec 2021 08:00  Phos  3.1     12-02  Mg     1.9     12-02          ALLERGIES  No Known Allergies      MEDICATIONS   acetaminophen     Tablet .. 650 milliGRAM(s) Oral every 6 hours PRN  acetaminophen   IVPB .. 1000 milliGRAM(s) IV Intermittent once  atorvastatin 80 milliGRAM(s) Oral at bedtime  dexAMETHasone     Tablet 4 milliGRAM(s) Oral every 8 hours  dexAMETHasone     Tablet   Oral   dextrose 40% Gel 15 Gram(s) Oral once  dextrose 5%. 1000 milliLiter(s) IV Continuous <Continuous>  dextrose 5%. 1000 milliLiter(s) IV Continuous <Continuous>  dextrose 50% Injectable 25 Gram(s) IV Push once  dextrose 50% Injectable 12.5 Gram(s) IV Push once  dextrose 50% Injectable 25 Gram(s) IV Push once  DULoxetine 20 milliGRAM(s) Oral daily  gabapentin 300 milliGRAM(s) Oral three times a day  glucagon  Injectable 1 milliGRAM(s) IntraMuscular once  insulin lispro (ADMELOG) corrective regimen sliding scale   SubCutaneous Before meals and at bedtime  melatonin 3 milliGRAM(s) Oral at bedtime PRN  oxyCODONE    IR 5 milliGRAM(s) Oral every 6 hours PRN  oxyCODONE    IR 10 milliGRAM(s) Oral every 6 hours PRN  pantoprazole    Tablet 40 milliGRAM(s) Oral before breakfast  polyethylene glycol 3350 17 Gram(s) Oral daily  senna 2 Tablet(s) Oral at bedtime PRN      ----------------------------------------------------------------------------------------  PHYSICAL EXAM  Constitutional - NAD, Comfortable  HEENT - NCAT, EOMI  Neck - Supple, No limited ROM  Chest - Breathing comfortably, No wheezing  Cardiovascular - No cyanosis   Abdomen - Soft   Extremities - No C/C/E, No calf tenderness   Neurologic Exam -                    Cognitive - Awake, Alert, AAO to self, place, date, year, situation     Communication - Fluent, No dysarthria     Cranial Nerves - CN 2-12 intact     Motor - 5/5 in bilateral UE, LLE 4/5, RLE 4/5 throughout        Sensory - Intact to LT     Reflexes - No clonus, -Babinski   Psychiatric - Mood stable, Affect WNL  ----------------------------------------------------------------------------------------  ASSESSMENT/PLAN  81yMale with functional deficits after T10-T11 disc herniation with cord compression, now s/p T10-T11 laminectomy  T10-T11 disc herniation with cord compression, now s/p T10-T11 laminectomy - Decadron taper   Pain - Tylenol, gabapentin, oxycodone  At risk for Neurogenic bladder-monitor PVRs for urinary retention  At Risk for neurogenic bowel - monitor for bowel movements, consider bisacodyl suppository if no bowel movement  DVT PPX - SCDs, pending initiation of chemoprophylaxis per neurosurgery  Rehab -  Continue PT/OT.  Recommend ACUTE inpatient rehabilitation for the functional deficits consisting of 3 hours of therapy/day & 24 hour RN/daily PMR physician for comorbid medical management. Patient will be able to tolerate 3 hours a day.    Will continue to follow. Functional progress will determine ongoing rehab dispo recommendations, which may change.    Continue bedside therapy as well as OOB throughout the day with mobilization by staff to maintain cardiopulmonary function and prevention of secondary complications related to debility.

## 2021-12-03 NOTE — DISCHARGE NOTE NURSING/CASE MANAGEMENT/SOCIAL WORK - PATIENT PORTAL LINK FT
You can access the FollowMyHealth Patient Portal offered by [facility] by registering at the following website: [email or web address]. By joining [facility]’s FollowMyHealth portal, you will also be able to view your health information using other applications (apps) compatible with our system.

## 2021-12-06 ENCOUNTER — APPOINTMENT (OUTPATIENT)
Dept: NEUROLOGY | Facility: CLINIC | Age: 81
End: 2021-12-06

## 2021-12-21 ENCOUNTER — RX RENEWAL (OUTPATIENT)
Age: 81
End: 2021-12-21

## 2021-12-22 PROCEDURE — 84443 ASSAY THYROID STIM HORMONE: CPT

## 2021-12-22 PROCEDURE — 96374 THER/PROPH/DIAG INJ IV PUSH: CPT

## 2021-12-22 PROCEDURE — G1004: CPT

## 2021-12-22 PROCEDURE — 36415 COLL VENOUS BLD VENIPUNCTURE: CPT

## 2021-12-22 PROCEDURE — 84100 ASSAY OF PHOSPHORUS: CPT

## 2021-12-22 PROCEDURE — 83036 HEMOGLOBIN GLYCOSYLATED A1C: CPT

## 2021-12-22 PROCEDURE — 85610 PROTHROMBIN TIME: CPT

## 2021-12-22 PROCEDURE — 72146 MRI CHEST SPINE W/O DYE: CPT | Mod: MG

## 2021-12-22 PROCEDURE — P9100: CPT

## 2021-12-22 PROCEDURE — C1889: CPT

## 2021-12-22 PROCEDURE — 85730 THROMBOPLASTIN TIME PARTIAL: CPT

## 2021-12-22 PROCEDURE — 72128 CT CHEST SPINE W/O DYE: CPT | Mod: MG

## 2021-12-22 PROCEDURE — 72148 MRI LUMBAR SPINE W/O DYE: CPT | Mod: ME

## 2021-12-22 PROCEDURE — 99285 EMERGENCY DEPT VISIT HI MDM: CPT | Mod: 25

## 2021-12-22 PROCEDURE — 83735 ASSAY OF MAGNESIUM: CPT

## 2021-12-22 PROCEDURE — 85652 RBC SED RATE AUTOMATED: CPT

## 2021-12-22 PROCEDURE — 85025 COMPLETE CBC W/AUTO DIFF WBC: CPT

## 2021-12-22 PROCEDURE — 0225U NFCT DS DNA&RNA 21 SARSCOV2: CPT

## 2021-12-22 PROCEDURE — 82962 GLUCOSE BLOOD TEST: CPT

## 2021-12-22 PROCEDURE — 86769 SARS-COV-2 COVID-19 ANTIBODY: CPT

## 2021-12-22 PROCEDURE — C1769: CPT

## 2021-12-22 PROCEDURE — 85027 COMPLETE CBC AUTOMATED: CPT

## 2021-12-22 PROCEDURE — 72141 MRI NECK SPINE W/O DYE: CPT

## 2021-12-22 PROCEDURE — 36430 TRANSFUSION BLD/BLD COMPNT: CPT

## 2021-12-22 PROCEDURE — U0003: CPT

## 2021-12-22 PROCEDURE — 93005 ELECTROCARDIOGRAM TRACING: CPT

## 2021-12-22 PROCEDURE — 86901 BLOOD TYPING SEROLOGIC RH(D): CPT

## 2021-12-22 PROCEDURE — 80061 LIPID PANEL: CPT

## 2021-12-22 PROCEDURE — P9037: CPT

## 2021-12-22 PROCEDURE — 86850 RBC ANTIBODY SCREEN: CPT

## 2021-12-22 PROCEDURE — 76000 FLUOROSCOPY <1 HR PHYS/QHP: CPT

## 2021-12-22 PROCEDURE — 72131 CT LUMBAR SPINE W/O DYE: CPT | Mod: ME

## 2021-12-22 PROCEDURE — U0005: CPT

## 2021-12-22 PROCEDURE — C8929: CPT

## 2021-12-22 PROCEDURE — 97167 OT EVAL HIGH COMPLEX 60 MIN: CPT

## 2021-12-22 PROCEDURE — 86140 C-REACTIVE PROTEIN: CPT

## 2021-12-22 PROCEDURE — 80053 COMPREHEN METABOLIC PANEL: CPT

## 2021-12-22 PROCEDURE — 80048 BASIC METABOLIC PNL TOTAL CA: CPT

## 2021-12-22 PROCEDURE — 86900 BLOOD TYPING SEROLOGIC ABO: CPT

## 2021-12-22 PROCEDURE — 88304 TISSUE EXAM BY PATHOLOGIST: CPT

## 2022-01-13 ENCOUNTER — APPOINTMENT (OUTPATIENT)
Dept: NEUROSURGERY | Facility: CLINIC | Age: 82
End: 2022-01-13

## 2022-01-20 ENCOUNTER — APPOINTMENT (OUTPATIENT)
Dept: NEUROSURGERY | Facility: CLINIC | Age: 82
End: 2022-01-20
Payer: MEDICARE

## 2022-01-20 VITALS
HEART RATE: 63 BPM | WEIGHT: 160 LBS | SYSTOLIC BLOOD PRESSURE: 145 MMHG | OXYGEN SATURATION: 97 % | TEMPERATURE: 98.2 F | BODY MASS INDEX: 22.4 KG/M2 | HEIGHT: 71 IN | DIASTOLIC BLOOD PRESSURE: 83 MMHG

## 2022-01-20 DIAGNOSIS — M48.061 SPINAL STENOSIS, LUMBAR REGION WITHOUT NEUROGENIC CLAUDICATION: ICD-10-CM

## 2022-01-20 PROCEDURE — 99024 POSTOP FOLLOW-UP VISIT: CPT

## 2022-01-20 NOTE — PHYSICAL EXAM
[General Appearance - Alert] : alert [General Appearance - In No Acute Distress] : in no acute distress [Healing Well] : healing well [Erythema] : erythematous [Oriented To Time, Place, And Person] : oriented to person, place, and time [Impaired Insight] : insight and judgment were intact [5] : C5 Biceps 5/5 [3] : S1 ankle flexors 3/5 [FreeTextEntry8] : stands with 1-2 people assist in rehab [Extraocular Movements] : extraocular movements were intact [Outer Ear] : the ears and nose were normal in appearance [Neck Appearance] : the appearance of the neck was normal [] : no respiratory distress [Respiration, Rhythm And Depth] : normal respiratory rhythm and effort [Exaggerated Use Of Accessory Muscles For Inspiration] : no accessory muscle use [Heart Rate And Rhythm] : heart rate was normal and rhythm regular [Abnormal Walk] : normal gait [Involuntary Movements] : no involuntary movements were seen [Skin Color & Pigmentation] : normal skin color and pigmentation [Skin Turgor] : normal skin turgor

## 2022-01-20 NOTE — HISTORY OF PRESENT ILLNESS
[FreeTextEntry1] : Mr. Kalpesh Tamez who is 81 years old presents to the office for a follow up. He is a patient of Dr. Sanches who underwent T10-T11 disc herniation, compression of the spinal cord, severe paraparesis on 11/28/2021. \par The patient was admitted on 11/26/21 due to the inability to walk since the end of September. The wife states she was caring for him until his hospitalization to [facility].Evaluation revealed significant stenosis at the level of T10- T11 with severe compression of the cord. The stenosis was due to disc herniation and hypertrophied flavum ligament. \par He had bilateral lower extremity weakness on initial exam. After surgery, patient and wife state his lower extremity motor function was unchanged, but he was relieved of lower back pain. \par  PMH HTN, HLD, DM2, CAD s/p CABG, chronic back pain with history of laminectomy and fusion.\par Patient remains on ASA. \par He was discharged to [facility] on 12/3/2021 and remains there for care. \par 	 \par Today, he presents by ambulette with his wife. He is awake, alert and conversant. He sits in a wheelchair. He denies back pain. Thoracic incision has healed, but is erythemic, blanching due to pressure. \par I advised patient to turn q 2 hours while in bed or in w/c or a roll to keep off back. \par Motor function 3/5 bilateral lower extremities. No numbness/tingling. \par \par Plan: Keep off of back\par FU with Dr. Sanches \par \par

## 2022-01-20 NOTE — REVIEW OF SYSTEMS
[Feeling Poorly] : feeling poorly [Feeling Tired] : feeling tired [Depression] : depression [Leg Weakness] : leg weakness [Inability to Walk] : inability to walk [Frequent Falls] : frequent falls [Negative] : Genitourinary

## 2022-05-03 ENCOUNTER — APPOINTMENT (OUTPATIENT)
Dept: NEUROSURGERY | Facility: CLINIC | Age: 82
End: 2022-05-03

## 2022-06-02 ENCOUNTER — INPATIENT (INPATIENT)
Facility: HOSPITAL | Age: 82
LOS: 4 days | Discharge: NURSING, REHAB OR LONG-TERM CARE | DRG: 74 | End: 2022-06-07
Attending: HOSPITALIST | Admitting: HOSPITALIST
Payer: MEDICARE

## 2022-06-02 VITALS
SYSTOLIC BLOOD PRESSURE: 153 MMHG | DIASTOLIC BLOOD PRESSURE: 81 MMHG | HEIGHT: 71 IN | HEART RATE: 56 BPM | OXYGEN SATURATION: 98 % | TEMPERATURE: 98 F | WEIGHT: 169.98 LBS | RESPIRATION RATE: 18 BRPM

## 2022-06-02 DIAGNOSIS — Z95.1 PRESENCE OF AORTOCORONARY BYPASS GRAFT: Chronic | ICD-10-CM

## 2022-06-02 LAB
ALBUMIN SERPL ELPH-MCNC: 3.7 G/DL — SIGNIFICANT CHANGE UP (ref 3.3–5.2)
ALP SERPL-CCNC: 76 U/L — SIGNIFICANT CHANGE UP (ref 40–120)
ALT FLD-CCNC: 15 U/L — SIGNIFICANT CHANGE UP
ANION GAP SERPL CALC-SCNC: 15 MMOL/L — SIGNIFICANT CHANGE UP (ref 5–17)
AST SERPL-CCNC: 14 U/L — SIGNIFICANT CHANGE UP
BASOPHILS # BLD AUTO: 0.02 K/UL — SIGNIFICANT CHANGE UP (ref 0–0.2)
BASOPHILS NFR BLD AUTO: 0.3 % — SIGNIFICANT CHANGE UP (ref 0–2)
BILIRUB SERPL-MCNC: 0.4 MG/DL — SIGNIFICANT CHANGE UP (ref 0.4–2)
BUN SERPL-MCNC: 14.4 MG/DL — SIGNIFICANT CHANGE UP (ref 8–20)
CALCIUM SERPL-MCNC: 8.5 MG/DL — LOW (ref 8.6–10.2)
CHLORIDE SERPL-SCNC: 99 MMOL/L — SIGNIFICANT CHANGE UP (ref 98–107)
CO2 SERPL-SCNC: 25 MMOL/L — SIGNIFICANT CHANGE UP (ref 22–29)
CREAT SERPL-MCNC: 0.53 MG/DL — SIGNIFICANT CHANGE UP (ref 0.5–1.3)
EGFR: 101 ML/MIN/1.73M2 — SIGNIFICANT CHANGE UP
EOSINOPHIL # BLD AUTO: 0.05 K/UL — SIGNIFICANT CHANGE UP (ref 0–0.5)
EOSINOPHIL NFR BLD AUTO: 0.7 % — SIGNIFICANT CHANGE UP (ref 0–6)
GLUCOSE SERPL-MCNC: 96 MG/DL — SIGNIFICANT CHANGE UP (ref 70–99)
HCT VFR BLD CALC: 38.3 % — LOW (ref 39–50)
HGB BLD-MCNC: 12.7 G/DL — LOW (ref 13–17)
IMM GRANULOCYTES NFR BLD AUTO: 0.3 % — SIGNIFICANT CHANGE UP (ref 0–1.5)
LYMPHOCYTES # BLD AUTO: 1.18 K/UL — SIGNIFICANT CHANGE UP (ref 1–3.3)
LYMPHOCYTES # BLD AUTO: 17.2 % — SIGNIFICANT CHANGE UP (ref 13–44)
MCHC RBC-ENTMCNC: 29.5 PG — SIGNIFICANT CHANGE UP (ref 27–34)
MCHC RBC-ENTMCNC: 33.2 GM/DL — SIGNIFICANT CHANGE UP (ref 32–36)
MCV RBC AUTO: 89.1 FL — SIGNIFICANT CHANGE UP (ref 80–100)
MONOCYTES # BLD AUTO: 0.61 K/UL — SIGNIFICANT CHANGE UP (ref 0–0.9)
MONOCYTES NFR BLD AUTO: 8.9 % — SIGNIFICANT CHANGE UP (ref 2–14)
NEUTROPHILS # BLD AUTO: 4.98 K/UL — SIGNIFICANT CHANGE UP (ref 1.8–7.4)
NEUTROPHILS NFR BLD AUTO: 72.6 % — SIGNIFICANT CHANGE UP (ref 43–77)
PLATELET # BLD AUTO: 254 K/UL — SIGNIFICANT CHANGE UP (ref 150–400)
POTASSIUM SERPL-MCNC: 4 MMOL/L — SIGNIFICANT CHANGE UP (ref 3.5–5.3)
POTASSIUM SERPL-SCNC: 4 MMOL/L — SIGNIFICANT CHANGE UP (ref 3.5–5.3)
PROT SERPL-MCNC: 6.6 G/DL — SIGNIFICANT CHANGE UP (ref 6.6–8.7)
RBC # BLD: 4.3 M/UL — SIGNIFICANT CHANGE UP (ref 4.2–5.8)
RBC # FLD: 15.1 % — HIGH (ref 10.3–14.5)
SARS-COV-2 RNA SPEC QL NAA+PROBE: SIGNIFICANT CHANGE UP
SODIUM SERPL-SCNC: 139 MMOL/L — SIGNIFICANT CHANGE UP (ref 135–145)
WBC # BLD: 6.86 K/UL — SIGNIFICANT CHANGE UP (ref 3.8–10.5)
WBC # FLD AUTO: 6.86 K/UL — SIGNIFICANT CHANGE UP (ref 3.8–10.5)

## 2022-06-02 PROCEDURE — 99220: CPT | Mod: FS,GC

## 2022-06-02 PROCEDURE — 72125 CT NECK SPINE W/O DYE: CPT | Mod: 26,MA

## 2022-06-02 RX ORDER — METFORMIN HYDROCHLORIDE 850 MG/1
1000 TABLET ORAL
Refills: 0 | Status: DISCONTINUED | OUTPATIENT
Start: 2022-06-02 | End: 2022-06-07

## 2022-06-02 RX ORDER — GABAPENTIN 400 MG/1
300 CAPSULE ORAL ONCE
Refills: 0 | Status: COMPLETED | OUTPATIENT
Start: 2022-06-02 | End: 2022-06-02

## 2022-06-02 RX ORDER — DULOXETINE HYDROCHLORIDE 30 MG/1
20 CAPSULE, DELAYED RELEASE ORAL DAILY
Refills: 0 | Status: DISCONTINUED | OUTPATIENT
Start: 2022-06-02 | End: 2022-06-07

## 2022-06-02 RX ORDER — ASPIRIN/CALCIUM CARB/MAGNESIUM 324 MG
81 TABLET ORAL DAILY
Refills: 0 | Status: DISCONTINUED | OUTPATIENT
Start: 2022-06-02 | End: 2022-06-03

## 2022-06-02 RX ORDER — AMLODIPINE BESYLATE 2.5 MG/1
5 TABLET ORAL DAILY
Refills: 0 | Status: DISCONTINUED | OUTPATIENT
Start: 2022-06-02 | End: 2022-06-07

## 2022-06-02 RX ORDER — GABAPENTIN 400 MG/1
300 CAPSULE ORAL THREE TIMES A DAY
Refills: 0 | Status: DISCONTINUED | OUTPATIENT
Start: 2022-06-02 | End: 2022-06-07

## 2022-06-02 RX ORDER — OXYCODONE AND ACETAMINOPHEN 5; 325 MG/1; MG/1
1 TABLET ORAL EVERY 6 HOURS
Refills: 0 | Status: DISCONTINUED | OUTPATIENT
Start: 2022-06-02 | End: 2022-06-07

## 2022-06-02 RX ORDER — LIDOCAINE 4 G/100G
1 CREAM TOPICAL ONCE
Refills: 0 | Status: COMPLETED | OUTPATIENT
Start: 2022-06-02 | End: 2022-06-02

## 2022-06-02 RX ORDER — LIDOCAINE 4 G/100G
1 CREAM TOPICAL DAILY
Refills: 0 | Status: DISCONTINUED | OUTPATIENT
Start: 2022-06-02 | End: 2022-06-07

## 2022-06-02 RX ORDER — LISINOPRIL 2.5 MG/1
20 TABLET ORAL DAILY
Refills: 0 | Status: DISCONTINUED | OUTPATIENT
Start: 2022-06-02 | End: 2022-06-07

## 2022-06-02 RX ORDER — KETOROLAC TROMETHAMINE 30 MG/ML
15 SYRINGE (ML) INJECTION ONCE
Refills: 0 | Status: DISCONTINUED | OUTPATIENT
Start: 2022-06-02 | End: 2022-06-02

## 2022-06-02 RX ORDER — PANTOPRAZOLE SODIUM 20 MG/1
40 TABLET, DELAYED RELEASE ORAL
Refills: 0 | Status: DISCONTINUED | OUTPATIENT
Start: 2022-06-02 | End: 2022-06-07

## 2022-06-02 RX ORDER — ATENOLOL 25 MG/1
50 TABLET ORAL DAILY
Refills: 0 | Status: DISCONTINUED | OUTPATIENT
Start: 2022-06-02 | End: 2022-06-07

## 2022-06-02 RX ORDER — METHOCARBAMOL 500 MG/1
1500 TABLET, FILM COATED ORAL EVERY 6 HOURS
Refills: 0 | Status: DISCONTINUED | OUTPATIENT
Start: 2022-06-02 | End: 2022-06-03

## 2022-06-02 RX ADMIN — GABAPENTIN 300 MILLIGRAM(S): 400 CAPSULE ORAL at 23:29

## 2022-06-02 RX ADMIN — AMLODIPINE BESYLATE 5 MILLIGRAM(S): 2.5 TABLET ORAL at 21:12

## 2022-06-02 RX ADMIN — Medication 81 MILLIGRAM(S): at 21:13

## 2022-06-02 RX ADMIN — LIDOCAINE 1 PATCH: 4 CREAM TOPICAL at 17:56

## 2022-06-02 RX ADMIN — GABAPENTIN 300 MILLIGRAM(S): 400 CAPSULE ORAL at 17:56

## 2022-06-02 RX ADMIN — Medication 15 MILLIGRAM(S): at 17:56

## 2022-06-02 RX ADMIN — METHOCARBAMOL 1500 MILLIGRAM(S): 500 TABLET, FILM COATED ORAL at 21:13

## 2022-06-02 NOTE — ED CDU PROVIDER INITIAL DAY NOTE - OBJECTIVE STATEMENT
81y non-ambulatory Male PMH HTN, HLD, DM2, CAD s/p CABG, chronic back pain with history of laminectomy and fusion, presented to ED c/o worsening neck pain. described as dull throbbing pain, worse with movement, non radiating. he denies recent injury/trauma. also admits to chronic low back pain unchanged from baseline. and intermittent LE numbness.  follow closely with Dr. anne/che/shayla. labs WNL, pan controlled in ED. Placed into observation for MR, neurosurg, pan control. Neurosurg will evaluate in AM after MR results. CT neck added on in the meanwhile. pt denies headache, n/v/d, fever/chills, saddle anesthesia, incontinence.

## 2022-06-02 NOTE — ED PROVIDER NOTE - CLINICAL SUMMARY MEDICAL DECISION MAKING FREE TEXT BOX
81yM with pmh htn, hld, DMII, CAD s/p stents x7 and CABG, spinal stenosis, chronic back pain, s/p thoracic decompressive laminectomy presenting with back pain. 81yM with pmh htn, hld, DMII, CAD s/p stents x7 and CABG, spinal stenosis, chronic back pain, s/p thoracic decompressive laminectomy presenting with back pain. Patient unable to care for self and wife with injury. Patient is agreeable to go obs for possible placement. PT consult placed. Asked neurosurgery to speak to patient sometime today or tomorrow per patient's request. MRI ordered to re-evaluate spinal disease.

## 2022-06-02 NOTE — ED CDU PROVIDER INITIAL DAY NOTE - NS ED ATTENDING STATEMENT MOD
This was a shared visit with the ROSCOE. I reviewed and verified the documentation and independently performed the documented:

## 2022-06-02 NOTE — ED ADULT TRIAGE NOTE - CHIEF COMPLAINT QUOTE
BIBA from home c/o non traumatic  neck and back pain .  Hx of spinal fusion . As per EMS pt was bed bound for two week. Pt denies any injury. As per EMS pt family requesting psych eval due to depression

## 2022-06-02 NOTE — ED ADULT NURSE NOTE - CAS TRG GEN SKIN CONDITION
Marital Status:---single   Name of baby's father-Viktor and contact #---[phone]  Patient occupation-  at [employer] ([employer]) and contact #:---[phone]      Menstrual History  Menarche (ys)---12  Interval (days)---28  Length (days)---4-5    Genetic History  Age 35+(female)--n/a   Age 50+ (male)--n/a   Cerebral Palsy--no  Cleft lip/palate--no  Congenital Anomalies--no  Congenital Heart Disease--no  Consanguinity--no  Cystic fibrosis--no  Down's Syndrome--no  Hemophilia--no  [place]'s Chorea--no  Mental Retardation--patients brother- he also has [place]is syndrome  Muscular Dystrophy--no  Neural Tube Defect--no  Sickle Cell Disease/Trait--no  Yusuf Sachs Disease--no  Test for Fragile X--no  Thalassemia A or B--no  History of Twins--maternal cousins     History since LMP  Vaginal Bleeding--no  Abdominal/Epigastric Pain--mild cramping   Headache/dizziness--no  Changes in vision--no  Hyperemesis--no  Urinary complaints--no  Febrile episodes--no  Rash with viral illness--no  Physical trauma or surgery--no  Exposure to (HIV, CMV, HSV, Syphillis)--no  Exposure to Rubella, Varicella, PKU, Encephalitis--no  Exposure to occupational chemicals--no  Exposure to radiation--no  Exposure to Toxoplasmosis--no  Exposure to Tuberculosis--no        
Pt seen today for OB1. Discussed optional First Trimester Screen with pt. Pt will call if she chooses to do this. Pt given For Your Well Being sheets, Great Expectations Book, and What to Expect When You're Expecting Book. Pt instructed to call office with any questions or concerns. Sent to lab for blood work. Urine sent with pt. OB Annual scheduled.     Patient counseled and educated on Tdap vaccination during pregnancy. Patient would like to proceed with this. Injection scheduled for 8/16/18 at 9:30 am.   
Warm

## 2022-06-02 NOTE — ED ADULT NURSE NOTE - OBJECTIVE STATEMENT
Assumed care at 1950, received report from Wendy, patient A&Ox4, complains of back and neck pain. Patient's respirations are even and unlabored, VSS, no distress noted. Patient states at home he has been bed bound for 2 weeks due to the pain and has a history of spinal fusion.

## 2022-06-02 NOTE — ED CDU PROVIDER INITIAL DAY NOTE - ATTENDING APP SHARED VISIT CONTRIBUTION OF CARE
Pt with neck pain, prior surgery, intermittent numbness, placed into observation for MR total spine, neurosurg, pan control. Neurosurg will evaluate in AM after MR results.

## 2022-06-02 NOTE — ED PROVIDER NOTE - OBJECTIVE STATEMENT
81yM with pmh htn, hld, DMII, CAD s/p stents x7 and CABG, spinal stenosis, chronic back pain, s/p thoracic decompressive laminectomy 81yM with pmh htn, hld, DMII, CAD s/p stents x7 and CABG, spinal stenosis, chronic back pain, s/p thoracic decompressive laminectomy presenting with back pain. Patient reports he underwent surgery with Dr. Sanches in Fall 2021. He then went to rehab he was not really able to return to walking. Since returning home patient has been mostly bedbound with transitions to chair at home. Recently his wife fell and broke her arm so she has not been able to assist in moving him and he has been in bed for the past 2 weeks. He lives with only his wife who is his primary caregiver. Today pt is complaining of worsening cervical spine pain and paraspinal upper back pain. Reports lower leg strength is at baseline but does endorses paresthesia to lower ankle b/l.  Patient is requesting to speak to neurosurgeon. 81 year old M with pmh htn, hld, DMII, CAD s/p stents x7 and CABG, spinal stenosis, chronic back pain, s/p thoracic decompressive laminectomy presenting with back pain. Patient reports he underwent surgery with Dr. Sanches in Fall 2021. He then went to rehab he was not really able to return to walking. Since returning home patient has been mostly bedbound with transitions to chair at home. Recently his wife fell and broke her arm so she has not been able to assist in moving him and he has been in bed for the past 2 weeks. He lives with only his wife who is his primary caregiver. Today pt is complaining of worsening cervical spine pain and paraspinal upper back pain. Reports lower leg strength is at baseline but does endorses paresthesia to lower ankle b/l.  Patient is requesting to speak to neurosurgeon.

## 2022-06-02 NOTE — ED CDU PROVIDER INITIAL DAY NOTE - MEDICAL DECISION MAKING DETAILS
81y non-ambulatory Male PMH HTN, HLD, DM2, CAD s/p CABG, chronic back pain with history of laminectomy and fusion, presented to ED c/o worsening neck pain  - MR cervical/thoracic/lumbar   - pain control, robaxin/percocet/lidocaine patches   - Neurosurg consult   - daily meds   - neuro checks Q4

## 2022-06-02 NOTE — ED CDU PROVIDER INITIAL DAY NOTE - PHYSICAL EXAMINATION
pt well appearing in NAD, mild cervical spinal tenderness, FROM   NO midline thoracic/lumbar spinal tenderness 4/5 strength to LE's, no edema

## 2022-06-02 NOTE — ED ADULT NURSE NOTE - NSIMPLEMENTINTERV_GEN_ALL_ED
Implemented All Fall Risk Interventions:  [place] to call system. Call bell, personal items and telephone within reach. Instruct patient to call for assistance. Room bathroom lighting operational. Non-slip footwear when patient is off stretcher. Physically safe environment: no spills, clutter or unnecessary equipment. Stretcher in lowest position, wheels locked, appropriate side rails in place. Provide visual cue, wrist band, yellow gown, etc. Monitor gait and stability. Monitor for mental status changes and reorient to person, place, and time. Review medications for side effects contributing to fall risk. Reinforce activity limits and safety measures with patient and family.

## 2022-06-02 NOTE — ED PROVIDER NOTE - PHYSICAL EXAMINATION
Gen: no acute distress  Head: normocephalic, atraumatic  EENT: EOMI; +paraspinal TTP b/l  Lung: no increased work of breathing  CV: 2+ radial pulses b/l  Abd: soft, non-tender, non-distended, no rebound tenderness or guarding; no CVA tenderness  MSK: No edema, no visible deformities, full range of motion in all 4 extremities; neg straight leg raise   Neuro: No focal neurologic deficits

## 2022-06-03 DIAGNOSIS — M54.12 RADICULOPATHY, CERVICAL REGION: ICD-10-CM

## 2022-06-03 LAB
GLUCOSE BLDC GLUCOMTR-MCNC: 121 MG/DL — HIGH (ref 70–99)
GLUCOSE BLDC GLUCOMTR-MCNC: 144 MG/DL — HIGH (ref 70–99)
GLUCOSE BLDC GLUCOMTR-MCNC: 203 MG/DL — HIGH (ref 70–99)

## 2022-06-03 PROCEDURE — 72148 MRI LUMBAR SPINE W/O DYE: CPT | Mod: 26,MA

## 2022-06-03 PROCEDURE — 99222 1ST HOSP IP/OBS MODERATE 55: CPT

## 2022-06-03 PROCEDURE — 99217: CPT

## 2022-06-03 PROCEDURE — 72141 MRI NECK SPINE W/O DYE: CPT | Mod: 26,MA

## 2022-06-03 PROCEDURE — 72146 MRI CHEST SPINE W/O DYE: CPT | Mod: 26,MA

## 2022-06-03 RX ORDER — METHOCARBAMOL 500 MG/1
750 TABLET, FILM COATED ORAL EVERY 8 HOURS
Refills: 0 | Status: DISCONTINUED | OUTPATIENT
Start: 2022-06-03 | End: 2022-06-07

## 2022-06-03 RX ORDER — ASPIRIN/CALCIUM CARB/MAGNESIUM 324 MG
81 TABLET ORAL DAILY
Refills: 0 | Status: DISCONTINUED | OUTPATIENT
Start: 2022-06-03 | End: 2022-06-07

## 2022-06-03 RX ADMIN — ATENOLOL 50 MILLIGRAM(S): 25 TABLET ORAL at 06:06

## 2022-06-03 RX ADMIN — GABAPENTIN 300 MILLIGRAM(S): 400 CAPSULE ORAL at 06:06

## 2022-06-03 RX ADMIN — LISINOPRIL 20 MILLIGRAM(S): 2.5 TABLET ORAL at 06:05

## 2022-06-03 RX ADMIN — OXYCODONE AND ACETAMINOPHEN 1 TABLET(S): 5; 325 TABLET ORAL at 12:20

## 2022-06-03 RX ADMIN — GABAPENTIN 300 MILLIGRAM(S): 400 CAPSULE ORAL at 13:43

## 2022-06-03 RX ADMIN — LIDOCAINE 1 PATCH: 4 CREAM TOPICAL at 12:26

## 2022-06-03 RX ADMIN — GABAPENTIN 300 MILLIGRAM(S): 400 CAPSULE ORAL at 22:15

## 2022-06-03 RX ADMIN — LIDOCAINE 1 PATCH: 4 CREAM TOPICAL at 09:34

## 2022-06-03 RX ADMIN — METFORMIN HYDROCHLORIDE 1000 MILLIGRAM(S): 850 TABLET ORAL at 06:06

## 2022-06-03 RX ADMIN — DULOXETINE HYDROCHLORIDE 20 MILLIGRAM(S): 30 CAPSULE, DELAYED RELEASE ORAL at 12:26

## 2022-06-03 RX ADMIN — OXYCODONE AND ACETAMINOPHEN 1 TABLET(S): 5; 325 TABLET ORAL at 09:34

## 2022-06-03 RX ADMIN — METHOCARBAMOL 1500 MILLIGRAM(S): 500 TABLET, FILM COATED ORAL at 06:06

## 2022-06-03 RX ADMIN — Medication 81 MILLIGRAM(S): at 12:26

## 2022-06-03 RX ADMIN — PANTOPRAZOLE SODIUM 40 MILLIGRAM(S): 20 TABLET, DELAYED RELEASE ORAL at 06:06

## 2022-06-03 RX ADMIN — METFORMIN HYDROCHLORIDE 1000 MILLIGRAM(S): 850 TABLET ORAL at 17:34

## 2022-06-03 RX ADMIN — LIDOCAINE 1 PATCH: 4 CREAM TOPICAL at 06:05

## 2022-06-03 NOTE — H&P ADULT - MUSCULOSKELETAL
Outpatient Speech Language Pathology   Peds Swallow Treatment Note  [facility]     Patient Name: Reed Watkins  : 2001  MRN: [id number]  Today's Date: 6/3/[id number]   05/31/2019 Latoya Ziegler MS CCC-SLP GN 1 Filed                                        Time Calculation:   SLP Start Time: 1500  SLP Stop Time:  1615  SLP Time Calculation (min): 75 min                   Latoya Ziegler, MS CCC-SLP  6/3/2019   detailed exam neck/ROM intact/decreased ROM due to pain details…

## 2022-06-03 NOTE — ED ADULT NURSE REASSESSMENT NOTE - NS ED NURSE REASSESS COMMENT FT1
Assumed care at 1945, patient A&Ox4, respirations even and unlabored, VSS, no distress noted.
Assumed care of patient at approx 23:30. Patient AxOx4, laying in bed. Patient in no acute distress. Patient pending MRI and PT consult. Patient reassured of treatment plan. Safety maintained. Call bell usage reinforced and placed within reach.
Gave report to Brandie in OBS, pt A&Ox4, respirations even and unlabored, VSS, no distress noted. Patient pending MRI and consult.
c/o neck pain, a&ox4. NAD. as per pt " im feeling better after the meds" VSS.
Neuro at baseline, intact. Sensation intact. full ROM+ to all extremities, given percocet po with good relief in pain. Pending MRI.
Neuro intact.
Assumed care of the patient at 0730. Verbal report received from Brandie TREVIZO ED. Patient A&Ox4. No s/s of acute distress. Patient with persistent pain to neck radiating down to lower back and b/l feet numbness. Neuro intact, sensation intact, good strength to all extremities. VSS. Voiding in urinal without difficulty. Patient pending neurosx consult and MRI testing. Bedrest. Patient in understanding of plan of care. Patient with no further questions for the RN. Resting in comfort. Call bell within reach and encouraged to use when assistance needed. Will continue to monitor.
Assumed care of pt at 1930 from JOSELINE Angulo. Charting as noted.  pt resting comfortably in bed, no acute distress noted at this time safety maintained. Pt encouraged to report any needs or changes to staff.

## 2022-06-03 NOTE — ED CDU PROVIDER SUBSEQUENT DAY NOTE - ATTENDING APP SHARED VISIT CONTRIBUTION OF CARE
82yo male with PMH HTN, hyperlipidemia, MD2, Coronary Artery Disease, chronic back pain, nonambulatory at baseline p/w worsening neck pain and b/l LE numbness/tingling. Seen by NSG this AM. Pending MRI. Mone Pena DO

## 2022-06-03 NOTE — PROVIDER CONTACT NOTE (OTHER) - ASSESSMENT
Pt with 0/10 c/o pain before and after rx.   Pt is functionally at baseline level of function, and not in need of skilled PT, will no longer follow . Pt left supine in bed in no apparent distress and call bell within reach.

## 2022-06-03 NOTE — ED CDU PROVIDER DISPOSITION NOTE - CLINICAL COURSE
Lab Results   Component Value Date    HGBA1C 6 0 03/19/2021       Recent Labs     05/12/21  1651 05/13/21  0339 05/13/21  1224 05/13/21 2027   POCGLU 102 193* 262* 244*       Blood Sugar Average: Last 72 hrs:  (P) 242 [id number]     · On jardiance and insulin at home  · Home hold regimen in favor of SSI while NPO 81 year old M with pmh htn, hld, DMII, CAD s/p stents x7 and CABG, spinal stenosis, chronic back pain, s/p 11/2021 decompressive laminectomy presenting with back pain. Patient reports he underwent surgery with Dr. Sanches in Fall 2021. He then went to rehab he was not really able to return to walking. plan was initially get the MR C/T/ L spine neurosurgery will f.u and placement for the pt . as per CM pt need / anticipate to stay on obs until 6/6/2022 to get the [facility] rehab . spoke with  DR quintero who recommended the admission due to lent of the stay . I have called and made the neurosurgery update

## 2022-06-03 NOTE — ED ADULT NURSE REASSESSMENT NOTE - NURSING NEURO LEVEL OF CONSCIOUSNESS
No
alert and awake/follows commands
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
## 2022-06-03 NOTE — ED ADULT NURSE REASSESSMENT NOTE - NIH STROKE SCALE: 1B. LOC QUESTIONS, QM
Thank you for choosing [facility] for your  care today  If you have any questions about your ultrasound or care, please do not hesitate to contact us or your primary obstetrician 
(0) Answers both questions correctly

## 2022-06-03 NOTE — ED ADULT NURSE REASSESSMENT NOTE - SYMPTOMS
neck pain radiating down to lower back/pain:
neck pain radiating down to lower back, b/l feet numbness/pain:
neck pain radiating down to lower back/pain:

## 2022-06-03 NOTE — ED ADULT NURSE REASSESSMENT NOTE - COMFORT CARE
meal provided/plan of care explained/po fluids offered/repositioned
meal provided/plan of care explained/po fluids offered/repositioned/wait time explained
meal provided/plan of care explained/po fluids offered/repositioned/wait time explained

## 2022-06-03 NOTE — CHART NOTE - NSCHARTNOTEFT_GEN_A_CORE
SOCIAL WORK NOTE:  UNRULY AND OTHER DOCUMENTED CLINICALS UPLOADED INTO ACM AND CIRCULATED TO SEVERAL SNF FACILITES. [facility]. PATIENT MADE AWARE REGARDING BARRIERS TO PLACEMENT FROM ED ON A FRIDAY AND BED LIMITATIONS WITH COVID.  PATIENT EXPRESSED UNDERSTANDING OF SAME. SW FOLLOWING FOR [facility] PLACEMENT.

## 2022-06-03 NOTE — PHYSICAL THERAPY INITIAL EVALUATION ADULT - LEVEL OF INDEPENDENCE: SIT/STAND, REHAB EVAL
Transportation was already arranged by [employer] (). [employer] at [phone] received trip Edger Gideon #[id number] from [employer]. Called Sharkey and gave Edger Gideon [phone] [facility] was scheduled for 12:00 noon. No motor control of R LE/unable to perform

## 2022-06-03 NOTE — PATIENT PROFILE ADULT - FALL HARM RISK - HARM RISK INTERVENTIONS

## 2022-06-03 NOTE — H&P ADULT - HISTORY OF PRESENT ILLNESS
81 y.o.Male with hx of HTN, HLD, DM2, CAD s/p CABG, chronic back pain with multiple sx and history of laminectomy and fusion, currently presented to ED c/o worsening neck pain. described as dull throbbing pain 8/10, worse with movement, with radiation down his back and to b/l shoulder blades. He also reports no trauma or injury. He states that after his back sx he went to rehab and then came home on 3/11/22, since then he has been using a wheelchair, bed bound and mostly transitioning and transfer with help from his wife, who is his primary care taker. His wife unfortunately sustained a fall and broke her arm and is now unable to care for the patient. Pt also admits to chronic low back pain unchanged from baseline. and intermittent LE numbness. Pt  follows closely with Dr. Sanches/ Tiarra/ Renee. Pt evaluated in the ed, placed in ED observation and pain mx optimized and pan scan completed. Neurosx consulted for further recommendations and pt evaluated by PT and recommended to go to [facility]. Pt was accepted to multiple facilities but patients daughter works at [employer] and family was adamant about having patient be discharged there. The facility will be unable to accept the patient until monday. ED and sw requested admission for placement as the patient cannot leave until monday. Currently patient reports pain is controlled, Patient denies chest pain, SOB, abd pain, N/V, fever, chills, dysuria or any other complaints. All remainder ROS negative.

## 2022-06-03 NOTE — PHYSICAL THERAPY INITIAL EVALUATION ADULT - ADDITIONAL COMMENTS
Pt lives in a 1 story house with 3 steps to enter.  PTA, pt w/c and bedbound.  Transfers via [employer].  Wife cares for pt.
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
## 2022-06-03 NOTE — CHART NOTE - NSCHARTNOTEFT_GEN_A_CORE
KIMBERLEY Note: Per ED observation team, pt is anticipated to be ready for d/c tomorrow, pending MRI, plan is for [facility]. SW met w/ pt and pt's wife Prashant ([phone])  at bedside to review d/c plan for [facility], pt was declined by [facility] choice [facility], however has bed offers for DANICA at [facility] and [place] (offered by Mannie on ACM however writer called facility to confirm, facility reporting no beds available at this time). Wife at bedside vehemently refusing any other facility but [facility], stating her daughter is a nurse there, pt has a bed and they are expecting him. SW explained that per their response in our electronic referral system, [facility] is declining and stating they do not have any beds. Pt's wife called her daughter Shruti on speaker phone, Shruti also insisting that pt only go to [facility] at this time as she is a nurse there, stated she spoke to admissions and they will take him. SW explained that we will need to have a confirmation on our end pt has a bed at that facility, if not they will have to decide between bed offers at [place], [facility] or taking pt home over the weekend if pt is medically stable as pt cannot wait in ED for bed if pt has offers at other facilities. Pt's family still adamantly refusing any other plan but pt going to [facility] for D[facility], Shruti stated pt will be going to [facility] on Monday and she will have  call this writer with confirmation.    Shortly after KIMBERLEY received call from Anni Buck, [phone]  of [facility]. Anni stating they accidentally declined pt's referral however they can offer pt a bed for Monday. KIMBERLEY explained pt is in the ER not admitted, asked if pt could come over the weekend however per Anni they do not accept new pt's over a weekend, he can come first thing in the morning on Monday.     Obs team aware, SW notified family, also made family aware they may be billed for prolonged hospital stay as pt has bed offers at other facilities, family reporting understanding of such, SW following

## 2022-06-04 LAB
GLUCOSE BLDC GLUCOMTR-MCNC: 116 MG/DL — HIGH (ref 70–99)
GLUCOSE BLDC GLUCOMTR-MCNC: 121 MG/DL — HIGH (ref 70–99)
GLUCOSE BLDC GLUCOMTR-MCNC: 182 MG/DL — HIGH (ref 70–99)

## 2022-06-04 PROCEDURE — 99233 SBSQ HOSP IP/OBS HIGH 50: CPT

## 2022-06-04 PROCEDURE — 99223 1ST HOSP IP/OBS HIGH 75: CPT

## 2022-06-04 RX ADMIN — DULOXETINE HYDROCHLORIDE 20 MILLIGRAM(S): 30 CAPSULE, DELAYED RELEASE ORAL at 15:49

## 2022-06-04 RX ADMIN — LIDOCAINE 1 PATCH: 4 CREAM TOPICAL at 19:00

## 2022-06-04 RX ADMIN — GABAPENTIN 300 MILLIGRAM(S): 400 CAPSULE ORAL at 15:49

## 2022-06-04 RX ADMIN — LIDOCAINE 1 PATCH: 4 CREAM TOPICAL at 00:26

## 2022-06-04 RX ADMIN — AMLODIPINE BESYLATE 5 MILLIGRAM(S): 2.5 TABLET ORAL at 06:16

## 2022-06-04 RX ADMIN — LIDOCAINE 1 PATCH: 4 CREAM TOPICAL at 15:50

## 2022-06-04 RX ADMIN — METFORMIN HYDROCHLORIDE 1000 MILLIGRAM(S): 850 TABLET ORAL at 17:34

## 2022-06-04 RX ADMIN — METFORMIN HYDROCHLORIDE 1000 MILLIGRAM(S): 850 TABLET ORAL at 06:18

## 2022-06-04 RX ADMIN — METHOCARBAMOL 750 MILLIGRAM(S): 500 TABLET, FILM COATED ORAL at 15:49

## 2022-06-04 RX ADMIN — Medication 81 MILLIGRAM(S): at 15:49

## 2022-06-04 RX ADMIN — GABAPENTIN 300 MILLIGRAM(S): 400 CAPSULE ORAL at 06:17

## 2022-06-04 RX ADMIN — ATENOLOL 50 MILLIGRAM(S): 25 TABLET ORAL at 06:17

## 2022-06-04 RX ADMIN — LISINOPRIL 20 MILLIGRAM(S): 2.5 TABLET ORAL at 06:17

## 2022-06-04 NOTE — PROGRESS NOTE ADULT - ASSESSMENT
81 y.o.Male with hx of HTN, HLD, DM2, CAD s/p CABG, chronic back pain with multiple sx and history of laminectomy and fusion, currently presented to ED c/o worsening acute on chronic neck pain with radiation to b/l shoulder bladed and down the upper and mid back.     #Acute on chronic upper/ mid back pain   - hx of multiple back sx    - most recent  T10-T11 disc herniation with cord compression, s/p T10-T11 laminectomy and discectomy on 12/21  - pain control  - robaxin  - neurosurgery consult appreciated    #Peripheral neuropathy  - gabapentin    #HTN- Essential   - monitor blood pressure   - norvasc, lisinopril, atenolol    #DM2   - monitor fingersticks  - sliding scale  - metformin     #Depression   - duloxetine    #DVT Prophylaxis  - venodynes

## 2022-06-04 NOTE — CONSULT NOTE ADULT - ASSESSMENT
Assessment:  81 y.o.Male with hx of HTN, HLD, DM2, CAD s/p CABG, chronic back pain with multiple sx and history of laminectomy and fusion, currently presented to ED c/o worsening neck pain. described as dull throbbing pain 8/10, worse with movement, with radiation down his back and to b/l shoulder blades. He also reports no trauma or injury. He states that after his back sx he went to rehab and then came home on 3/11/22, since then he has been using a wheelchair, bed bound and mostly transitioning and transfer with help from his wife, who is his primary care taker. His wife unfortunately sustained a fall and broke her arm and is now unable to care for the patient. Pt also admits to chronic low back pain unchanged from baseline. and intermittent LE numbness. Pt  follows closely with Dr. Sanches/ Tiarra/ Renee. Pt evaluated in the ed, placed in ED observation and pain mx optimized and pan scan completed. Neurosx consulted for further recommendations and pt evaluated by PT and recommended to go to [facility]. Pt was accepted to multiple facilities but patients daughter works at [employer] and family was adamant about having patient be discharged there. The facility will be unable to accept the patient until monday. ED and sw requested admission for placement as the patient cannot leave until monday. Currently patient reports pain is controlled, Patient denies chest pain, SOB, abd pain, N/V, fever, chills, dysuria or any other complaints. All remainder ROS negative.       Plan  - pain control  - PT eval  - plan after rounds & MRI review     Assessment:  81 y.o.Male with hx of HTN, HLD, DM2, CAD s/p CABG, chronic back pain with multiple sx and history of laminectomy and fusion, currently presented to ED c/o worsening neck pain. described as dull throbbing pain 8/10, worse with movement, with radiation down his back and to b/l shoulder blades. He also reports no trauma or injury. He states that after his back sx he went to rehab and then came home on 3/11/22, since then he has been using a wheelchair, bed bound and mostly transitioning and transfer with help from his wife, who is his primary care taker. His wife unfortunately sustained a fall and broke her arm and is now unable to care for the patient. Pt also admits to chronic low back pain unchanged from baseline. and intermittent LE numbness. Pt  follows closely with Dr. Sanches/ Tiarra/ Renee. Pt evaluated in the ed, placed in ED observation and pain mx optimized and pan scan completed. Neurosx consulted for further recommendations and pt evaluated by PT and recommended to go to [facility]. Pt was accepted to multiple facilities but patients daughter works at [employer] and family was adamant about having patient be discharged there. The facility will be unable to accept the patient until monday. ED and sw requested admission for placement as the patient cannot leave until monday. Currently patient reports pain is controlled, Patient denies chest pain, SOB, abd pain, N/V, fever, chills, dysuria or any other complaints. All remainder ROS negative.       Plan  - pain control  - PT eval/[facility]  - imaging reviewed, no NSG intervention  - conservative management, rehab

## 2022-06-04 NOTE — CONSULT NOTE ADULT - NS ATTEND AMEND GEN_ALL_CORE FT
NSGY Attg:    see above    patient seen and examined    agree with exam as above  5/5  no Basurto's    MRIs reviewed  cervical spine with chronic degenerative changes with cord signal change at C3-4    agree with plan as above  plan for dispo to rehab  can f/u with Dr. Sanches as outpatient for cervical degenerative disease

## 2022-06-05 LAB
GLUCOSE BLDC GLUCOMTR-MCNC: 108 MG/DL — HIGH (ref 70–99)
GLUCOSE BLDC GLUCOMTR-MCNC: 110 MG/DL — HIGH (ref 70–99)
GLUCOSE BLDC GLUCOMTR-MCNC: 117 MG/DL — HIGH (ref 70–99)
GLUCOSE BLDC GLUCOMTR-MCNC: 146 MG/DL — HIGH (ref 70–99)
GLUCOSE BLDC GLUCOMTR-MCNC: 99 MG/DL — SIGNIFICANT CHANGE UP (ref 70–99)

## 2022-06-05 PROCEDURE — 99232 SBSQ HOSP IP/OBS MODERATE 35: CPT

## 2022-06-05 RX ADMIN — LIDOCAINE 1 PATCH: 4 CREAM TOPICAL at 13:33

## 2022-06-05 RX ADMIN — GABAPENTIN 300 MILLIGRAM(S): 400 CAPSULE ORAL at 00:53

## 2022-06-05 RX ADMIN — DULOXETINE HYDROCHLORIDE 20 MILLIGRAM(S): 30 CAPSULE, DELAYED RELEASE ORAL at 13:33

## 2022-06-05 RX ADMIN — GABAPENTIN 300 MILLIGRAM(S): 400 CAPSULE ORAL at 22:09

## 2022-06-05 RX ADMIN — Medication 81 MILLIGRAM(S): at 13:33

## 2022-06-05 RX ADMIN — LIDOCAINE 1 PATCH: 4 CREAM TOPICAL at 03:50

## 2022-06-05 RX ADMIN — LIDOCAINE 1 PATCH: 4 CREAM TOPICAL at 18:49

## 2022-06-05 RX ADMIN — GABAPENTIN 300 MILLIGRAM(S): 400 CAPSULE ORAL at 13:42

## 2022-06-05 RX ADMIN — METFORMIN HYDROCHLORIDE 1000 MILLIGRAM(S): 850 TABLET ORAL at 18:52

## 2022-06-05 RX ADMIN — METFORMIN HYDROCHLORIDE 1000 MILLIGRAM(S): 850 TABLET ORAL at 09:22

## 2022-06-05 RX ADMIN — PANTOPRAZOLE SODIUM 40 MILLIGRAM(S): 20 TABLET, DELAYED RELEASE ORAL at 08:05

## 2022-06-05 RX ADMIN — GABAPENTIN 300 MILLIGRAM(S): 400 CAPSULE ORAL at 08:05

## 2022-06-05 NOTE — PROGRESS NOTE ADULT - ASSESSMENT
81 y.o.Male with hx of HTN, HLD, DM2, CAD s/p CABG, chronic back pain with multiple sx and history of laminectomy and fusion, currently presented to ED c/o worsening acute on chronic neck pain with radiation to b/l shoulder bladed and down the upper and mid back.     #Acute on chronic upper/ mid back pain   - hx of multiple back sx    - most recent  T10-T11 disc herniation with cord compression, s/p T10-T11 laminectomy and discectomy on 12/21  - pain control  - robaxin  - neurosurgery consult appreciated    #Peripheral neuropathy  - gabapentin    #HTN- Essential   - monitor blood pressure   - norvasc, lisinopril, atenolol    #DM2   - monitor fingersticks  - sliding scale  - metformin     #Depression   - duloxetine    #DVT Prophylaxis  - venodynes    spoke to patient wife Shu [phone]

## 2022-06-06 ENCOUNTER — TRANSCRIPTION ENCOUNTER (OUTPATIENT)
Age: 82
End: 2022-06-06

## 2022-06-06 LAB
GLUCOSE BLDC GLUCOMTR-MCNC: 104 MG/DL — HIGH (ref 70–99)
GLUCOSE BLDC GLUCOMTR-MCNC: 107 MG/DL — HIGH (ref 70–99)
GLUCOSE BLDC GLUCOMTR-MCNC: 154 MG/DL — HIGH (ref 70–99)
SARS-COV-2 RNA SPEC QL NAA+PROBE: SIGNIFICANT CHANGE UP

## 2022-06-06 PROCEDURE — 99232 SBSQ HOSP IP/OBS MODERATE 35: CPT

## 2022-06-06 RX ORDER — OXYCODONE AND ACETAMINOPHEN 5; 325 MG/1; MG/1
1 TABLET ORAL
Qty: 0 | Refills: 0 | DISCHARGE
Start: 2022-06-06

## 2022-06-06 RX ORDER — LIDOCAINE 4 G/100G
1 CREAM TOPICAL
Qty: 0 | Refills: 0 | DISCHARGE
Start: 2022-06-06

## 2022-06-06 RX ORDER — METHOCARBAMOL 500 MG/1
1 TABLET, FILM COATED ORAL
Qty: 0 | Refills: 0 | DISCHARGE
Start: 2022-06-06

## 2022-06-06 RX ADMIN — LIDOCAINE 1 PATCH: 4 CREAM TOPICAL at 12:37

## 2022-06-06 RX ADMIN — GABAPENTIN 300 MILLIGRAM(S): 400 CAPSULE ORAL at 16:16

## 2022-06-06 RX ADMIN — PANTOPRAZOLE SODIUM 40 MILLIGRAM(S): 20 TABLET, DELAYED RELEASE ORAL at 05:40

## 2022-06-06 RX ADMIN — LIDOCAINE 1 PATCH: 4 CREAM TOPICAL at 19:52

## 2022-06-06 RX ADMIN — LIDOCAINE 1 PATCH: 4 CREAM TOPICAL at 00:00

## 2022-06-06 RX ADMIN — Medication 81 MILLIGRAM(S): at 12:37

## 2022-06-06 RX ADMIN — METFORMIN HYDROCHLORIDE 1000 MILLIGRAM(S): 850 TABLET ORAL at 19:14

## 2022-06-06 RX ADMIN — ATENOLOL 50 MILLIGRAM(S): 25 TABLET ORAL at 05:39

## 2022-06-06 RX ADMIN — LISINOPRIL 20 MILLIGRAM(S): 2.5 TABLET ORAL at 05:38

## 2022-06-06 RX ADMIN — GABAPENTIN 300 MILLIGRAM(S): 400 CAPSULE ORAL at 05:38

## 2022-06-06 RX ADMIN — AMLODIPINE BESYLATE 5 MILLIGRAM(S): 2.5 TABLET ORAL at 05:39

## 2022-06-06 RX ADMIN — DULOXETINE HYDROCHLORIDE 20 MILLIGRAM(S): 30 CAPSULE, DELAYED RELEASE ORAL at 12:37

## 2022-06-06 RX ADMIN — METFORMIN HYDROCHLORIDE 1000 MILLIGRAM(S): 850 TABLET ORAL at 05:38

## 2022-06-06 NOTE — DISCHARGE NOTE PROVIDER - NSDCMRMEDTOKEN_GEN_ALL_CORE_FT
amLODIPine 5 mg oral tablet: 1 tab(s) orally once a day  Hold for SBP&lt; 120mmHg  aspirin 81 mg oral tablet, chewable: 1 tab(s) orally once a day MDD:1 tab  atenolol 50 mg oral tablet: 1 tab(s) orally once a day  atorvastatin 80 mg oral tablet: 1 tab(s) orally once a day (at bedtime)  DULoxetine 20 mg oral delayed release capsule: 1 cap(s) orally once a day MDD:1 cap  gabapentin 300 mg oral capsule: 1 cap(s) orally 3 times a day MDD:3 tabs  lidocaine 4% topical film: Apply topically to affected area every 24 hours  lisinopril 20 mg oral tablet: 1 tab(s) orally once a day  metFORMIN 1000 mg oral tablet: 1 tab(s) orally 2 times a day  methocarbamol 750 mg oral tablet: 1 tab(s) orally every 8 hours, As needed, Muscle Spasm  oxycodone-acetaminophen 5 mg-325 mg oral tablet: 1 tab(s) orally every 6 hours, As needed, Mild Pain (1 - 3)  pantoprazole 40 mg oral delayed release tablet: 1 tab(s) orally once a day (before a meal)

## 2022-06-06 NOTE — DISCHARGE NOTE PROVIDER - NSDCCPCAREPLAN_GEN_ALL_CORE_FT
PRINCIPAL DISCHARGE DIAGNOSIS  Diagnosis: Cervical radiculopathy  Assessment and Plan of Treatment: - take medications as rx   - follow up with pcp and neurosurgery

## 2022-06-06 NOTE — DISCHARGE NOTE PROVIDER - PROVIDER TOKENS
PROVIDER:[TOKEN:[[id number]]],FREE:[LAST:[Primary Care Doctor],PHONE:[(   )    -],FAX:[(   )    -]]

## 2022-06-06 NOTE — DISCHARGE NOTE PROVIDER - HOSPITAL COURSE
81 y.o.Male with hx of HTN, HLD, DM2, CAD s/p CABG, chronic back pain with multiple sx and history of laminectomy and fusion, currently presented to ED c/o worsening neck pain. seen by neurosurgery while admitted with chronic pain. follow up outpatient with neurosurgery. now being discharged in stable condition to [facility].

## 2022-06-06 NOTE — DISCHARGE NOTE PROVIDER - CARE PROVIDER_API CALL
Se Sanches; PhD)  Neurosurgery  [address] [place] [address]  Phone: [phone]  Fax: [phone]  Follow Up Time:     Primary Care Doctor,   Phone: (   )    -  Fax: (   )    -  Follow Up Time:

## 2022-06-06 NOTE — PROGRESS NOTE ADULT - ASSESSMENT
81 y.o.Male with hx of HTN, HLD, DM2, CAD s/p CABG, chronic back pain with multiple sx and history of laminectomy and fusion, currently presented to ED c/o worsening acute on chronic neck pain with radiation to b/l shoulder bladed and down the upper and mid back.     #Acute on chronic upper/ mid back pain   - hx of multiple back sx    - most recent  T10-T11 disc herniation with cord compression, s/p T10-T11 laminectomy and discectomy on 12/21  - pain control  - robaxin  - neurosurgery consult appreciated    #Peripheral neuropathy  - gabapentin    #HTN- Essential   - monitor blood pressure   - norvasc, lisinopril, atenolol    #DM2   - monitor fingersticks  - sliding scale  - metformin     #Depression   - duloxetine    #DVT Prophylaxis  - venodynes    spoke to patient wife Shu [phone]   81 y.o.Male with hx of HTN, HLD, DM2, CAD s/p CABG, chronic back pain with multiple sx and history of laminectomy and fusion, currently presented to ED c/o worsening acute on chronic neck pain with radiation to b/l shoulder bladed and down the upper and mid back.     #Acute on chronic upper/ mid back pain   - hx of multiple back sx    - most recent  T10-T11 disc herniation with cord compression, s/p T10-T11 laminectomy and discectomy on 12/21  - pain control  - robaxin  - neurosurgery consult appreciated    #Peripheral neuropathy  - gabapentin    #HTN- Essential   - monitor blood pressure   - norvasc, lisinopril, atenolol    #DM2   - monitor fingersticks  - sliding scale  - metformin     #Depression   - duloxetine    #DVT Prophylaxis  - venodynes    attempted to call patient wife Shu [phone] no answer

## 2022-06-06 NOTE — DISCHARGE NOTE NURSING/CASE MANAGEMENT/SOCIAL WORK - NSDCPEFALRISK_GEN_ALL_CORE
For information on Fall & Injury Prevention, visit: [email or web address] OR  [email or web address] OR  https://www.cdc.gov/steadi/patient.html

## 2022-06-06 NOTE — DISCHARGE NOTE NURSING/CASE MANAGEMENT/SOCIAL WORK - PATIENT PORTAL LINK FT
You can access the FollowMyHealth Patient Portal offered by [facility] by registering at the following website: [email or web address]. By joining [facility]’s FollowMyHealth portal, you will also be able to view your health information using other applications (apps) compatible with our system.

## 2022-06-06 NOTE — DISCHARGE NOTE PROVIDER - ATTENDING DISCHARGE PHYSICAL EXAMINATION:
PHYSICAL EXAM:  General: NAD, A/O x 3  ENT: MMM, no thrush  Neck: Supple, No JVD  Lungs: Clear to auscultation bilaterally, good air entry, non-labored breathing  Cardio: +s1/s2  Abdomen: Soft, Nontender, Nondistended; Bowel sounds present  Extremities: No calf tenderness, No pitting edema

## 2022-06-07 VITALS
RESPIRATION RATE: 18 BRPM | OXYGEN SATURATION: 96 % | TEMPERATURE: 98 F | SYSTOLIC BLOOD PRESSURE: 152 MMHG | DIASTOLIC BLOOD PRESSURE: 80 MMHG | HEART RATE: 55 BPM

## 2022-06-07 PROCEDURE — 82962 GLUCOSE BLOOD TEST: CPT

## 2022-06-07 PROCEDURE — 72148 MRI LUMBAR SPINE W/O DYE: CPT | Mod: MA

## 2022-06-07 PROCEDURE — 99239 HOSP IP/OBS DSCHRG MGMT >30: CPT

## 2022-06-07 PROCEDURE — U0003: CPT

## 2022-06-07 PROCEDURE — 72141 MRI NECK SPINE W/O DYE: CPT | Mod: MA

## 2022-06-07 PROCEDURE — 72146 MRI CHEST SPINE W/O DYE: CPT | Mod: MA

## 2022-06-07 PROCEDURE — 96374 THER/PROPH/DIAG INJ IV PUSH: CPT

## 2022-06-07 PROCEDURE — G0378: CPT

## 2022-06-07 PROCEDURE — 99285 EMERGENCY DEPT VISIT HI MDM: CPT

## 2022-06-07 PROCEDURE — 36415 COLL VENOUS BLD VENIPUNCTURE: CPT

## 2022-06-07 PROCEDURE — 80053 COMPREHEN METABOLIC PANEL: CPT

## 2022-06-07 PROCEDURE — 85025 COMPLETE CBC W/AUTO DIFF WBC: CPT

## 2022-06-07 PROCEDURE — 72125 CT NECK SPINE W/O DYE: CPT | Mod: MA

## 2022-06-07 PROCEDURE — U0005: CPT

## 2022-06-07 RX ADMIN — AMLODIPINE BESYLATE 5 MILLIGRAM(S): 2.5 TABLET ORAL at 05:58

## 2022-06-07 RX ADMIN — METFORMIN HYDROCHLORIDE 1000 MILLIGRAM(S): 850 TABLET ORAL at 05:55

## 2022-06-07 RX ADMIN — PANTOPRAZOLE SODIUM 40 MILLIGRAM(S): 20 TABLET, DELAYED RELEASE ORAL at 05:55

## 2022-06-07 RX ADMIN — GABAPENTIN 300 MILLIGRAM(S): 400 CAPSULE ORAL at 05:58

## 2022-06-07 RX ADMIN — ATENOLOL 50 MILLIGRAM(S): 25 TABLET ORAL at 05:56

## 2022-06-07 RX ADMIN — LIDOCAINE 1 PATCH: 4 CREAM TOPICAL at 00:10

## 2022-06-07 RX ADMIN — LISINOPRIL 20 MILLIGRAM(S): 2.5 TABLET ORAL at 05:56

## 2022-06-07 NOTE — PROGRESS NOTE ADULT - ASSESSMENT
81 y.o.Male with hx of HTN, HLD, DM2, CAD s/p CABG, chronic back pain with multiple sx and history of laminectomy and fusion, currently presented to ED c/o worsening acute on chronic neck pain with radiation to b/l shoulder bladed and down the upper and mid back.     #Acute on chronic upper/ mid back pain   - hx of multiple back sx    - most recent  T10-T11 disc herniation with cord compression, s/p T10-T11 laminectomy and discectomy on 12/21  - pain control  - robaxin  - neurosurgery consult appreciated    #Peripheral neuropathy  - gabapentin    #HTN- Essential   - monitor blood pressure   - norvasc, lisinopril, atenolol    #DM2   - monitor fingersticks  - sliding scale  - metformin     #Depression   - duloxetine    #DVT Prophylaxis  - venodynes    spoke to patient wife Shu [phone] all questions answered

## 2022-06-07 NOTE — PROGRESS NOTE ADULT - REASON FOR ADMISSION
Uncontrolled neck pain   Worsening back pain

## 2022-06-07 NOTE — PROGRESS NOTE ADULT - SUBJECTIVE AND OBJECTIVE BOX
Patient is a 81y old  Male who presents with a chief complaint of Uncontrolled neck pain   Worsening back pain (05 Jun 2022 10:21)    Patient seen and examined at bedside.     ALLERGIES:  No Known Allergies    MEDICATIONS  (STANDING):  amLODIPine   Tablet 5 milliGRAM(s) Oral daily  aspirin enteric coated 81 milliGRAM(s) Oral daily  ATENolol  Tablet 50 milliGRAM(s) Oral daily  DULoxetine 20 milliGRAM(s) Oral daily  gabapentin 300 milliGRAM(s) Oral three times a day  lidocaine   4% Patch 1 Patch Transdermal daily  lisinopril 20 milliGRAM(s) Oral daily  metFORMIN 1000 milliGRAM(s) Oral two times a day  pantoprazole    Tablet 40 milliGRAM(s) Oral before breakfast    MEDICATIONS  (PRN):  methocarbamol 750 milliGRAM(s) Oral every 8 hours PRN Muscle Spasm  oxycodone    5 mG/acetaminophen 325 mG 1 Tablet(s) Oral every 6 hours PRN Mild Pain (1 - 3)    Vital Signs Last 24 Hrs  T(F): 97.5 (06 Jun 2022 10:26), Max: 98 (05 Jun 2022 19:55)  HR: 54 (06 Jun 2022 10:26) (54 - 60)  BP: 115/63 (06 Jun 2022 10:26) (109/70 - 148/69)  RR: 14 (06 Jun 2022 10:26) (14 - 18)  SpO2: 97% (06 Jun 2022 10:26) (94% - 97%)  I&O's Summary    PHYSICAL EXAM:  General: NAD, A/O x 3  ENT: MMM, no thrush  Neck: Supple, No JVD  Lungs: Clear to auscultation bilaterally, good air entry, non-labored breathing  Cardio: +s1/s2  Abdomen: Soft, Nontender, Nondistended; Bowel sounds present  Extremities: No calf tenderness, No pitting edema      LABS:    Glucose  POCT Blood Glucose.: 104 mg/dL (06 Jun 2022 06:59)  POCT Blood Glucose.: 108 mg/dL (05 Jun 2022 22:02)  POCT Blood Glucose.: 146 mg/dL (05 Jun 2022 17:55)  POCT Blood Glucose.: 117 mg/dL (05 Jun 2022 11:46)    COVID-19 PCR: NotDetec (06-02-22 @ 17:59)    RADIOLOGY & ADDITIONAL TESTS:  - no new tests  
Patient is a 81y old  Male who presents with a chief complaint of Uncontrolled neck pain   Worsening back pain (06 Jun 2022 11:36)    Patient seen and examined at bedside.     ALLERGIES:  No Known Allergies    MEDICATIONS  (STANDING):  amLODIPine   Tablet 5 milliGRAM(s) Oral daily  aspirin enteric coated 81 milliGRAM(s) Oral daily  ATENolol  Tablet 50 milliGRAM(s) Oral daily  DULoxetine 20 milliGRAM(s) Oral daily  gabapentin 300 milliGRAM(s) Oral three times a day  lidocaine   4% Patch 1 Patch Transdermal daily  lisinopril 20 milliGRAM(s) Oral daily  metFORMIN 1000 milliGRAM(s) Oral two times a day  pantoprazole    Tablet 40 milliGRAM(s) Oral before breakfast    MEDICATIONS  (PRN):  methocarbamol 750 milliGRAM(s) Oral every 8 hours PRN Muscle Spasm  oxycodone    5 mG/acetaminophen 325 mG 1 Tablet(s) Oral every 6 hours PRN Mild Pain (1 - 3)    Vital Signs Last 24 Hrs  T(F): 97.9 (07 Jun 2022 04:56), Max: 98 (06 Jun 2022 20:16)  HR: 60 (07 Jun 2022 05:53) (52 - 65)  BP: 148/81 (07 Jun 2022 04:56) (115/63 - 161/78)  RR: 18 (07 Jun 2022 04:56) (14 - 18)  SpO2: 96% (07 Jun 2022 04:56) (92% - 97%)  I&O's Summary    06 Jun 2022 07:01  -  07 Jun 2022 07:00  --------------------------------------------------------  IN: 0 mL / OUT: 900 mL / NET: -900 mL      PHYSICAL EXAM:  General: NAD, A/O x 3  ENT: MMM, no thrush  Neck: Supple, No JVD  Lungs: Clear to auscultation bilaterally, good air entry, non-labored breathing  Cardio: +s1/s2  Abdomen: Soft, Nontender, Nondistended; Bowel sounds present  Extremities: No calf tenderness, No pitting edema      LABS:    Glucose  POCT Blood Glucose.: 154 mg/dL (06 Jun 2022 19:13)  POCT Blood Glucose.: 107 mg/dL (06 Jun 2022 11:41)    COVID-19 PCR: NotDetec (06-06-22 @ 11:45)  COVID-19 PCR: NotDetec (06-02-22 @ 17:59)    RADIOLOGY & ADDITIONAL TESTS:  - no new tests  
Patient is a 81y old  Male who presents with a chief complaint of Uncontrolled neck pain   Worsening back pain (04 Jun 2022 12:14)    Patient seen and examined at bedside.     ALLERGIES:  No Known Allergies    MEDICATIONS  (STANDING):  amLODIPine   Tablet 5 milliGRAM(s) Oral daily  aspirin enteric coated 81 milliGRAM(s) Oral daily  ATENolol  Tablet 50 milliGRAM(s) Oral daily  DULoxetine 20 milliGRAM(s) Oral daily  gabapentin 300 milliGRAM(s) Oral three times a day  lidocaine   4% Patch 1 Patch Transdermal daily  lisinopril 20 milliGRAM(s) Oral daily  metFORMIN 1000 milliGRAM(s) Oral two times a day  pantoprazole    Tablet 40 milliGRAM(s) Oral before breakfast    MEDICATIONS  (PRN):  methocarbamol 750 milliGRAM(s) Oral every 8 hours PRN Muscle Spasm  oxycodone    5 mG/acetaminophen 325 mG 1 Tablet(s) Oral every 6 hours PRN Mild Pain (1 - 3)    Vital Signs Last 24 Hrs  T(F): 98.2 (05 Jun 2022 05:37), Max: 98.3 (04 Jun 2022 23:46)  HR: 58 (05 Jun 2022 05:37) (57 - 63)  BP: 156/76 (05 Jun 2022 05:37) (130/69 - 156/76)  RR: 18 (05 Jun 2022 05:37) (18 - 18)  SpO2: 95% (05 Jun 2022 05:37) (93% - 95%)  I&O's Summary    04 Jun 2022 07:01  -  05 Jun 2022 07:00  --------------------------------------------------------  IN: 0 mL / OUT: 500 mL / NET: -500 mL      PHYSICAL EXAM:  General: NAD, A/O x 3  ENT: MMM, no thrush  Neck: Supple, No JVD  Lungs: Clear to auscultation bilaterally, good air entry, non-labored breathing  Cardio: +s1/s2  Abdomen: Soft, Nontender, Nondistended; Bowel sounds present  Extremities: No calf tenderness, No pitting edema    LABS:                        12.7   6.86  )-----------( 254      ( 02 Jun 2022 17:59 )             38.3     06-02    139  |  99  |  14.4  ----------------------------<  96  4.0   |  25.0  |  0.53    Ca    8.5      02 Jun 2022 17:59    TPro  6.6  /  Alb  3.7  /  TBili  0.4  /  DBili  x   /  AST  14  /  ALT  15  /  AlkPhos  76  06-02    Glucose  POCT Blood Glucose.: 110 mg/dL (05 Jun 2022 09:03)  POCT Blood Glucose.: 99 mg/dL (05 Jun 2022 00:55)  POCT Blood Glucose.: 116 mg/dL (04 Jun 2022 17:31)  POCT Blood Glucose.: 182 mg/dL (04 Jun 2022 11:25)    COVID-19 PCR: NotDetec (06-02-22 @ 17:59)    RADIOLOGY & ADDITIONAL TESTS:  - no new tests  
Patient is a 81y old  Male who presents with a chief complaint of Uncontrolled neck pain   Worsening back pain (04 Jun 2022 09:50)    Patient seen and examined at bedside.     ALLERGIES:  No Known Allergies    MEDICATIONS  (STANDING):  amLODIPine   Tablet 5 milliGRAM(s) Oral daily  aspirin enteric coated 81 milliGRAM(s) Oral daily  ATENolol  Tablet 50 milliGRAM(s) Oral daily  DULoxetine 20 milliGRAM(s) Oral daily  gabapentin 300 milliGRAM(s) Oral three times a day  lidocaine   4% Patch 1 Patch Transdermal daily  lisinopril 20 milliGRAM(s) Oral daily  metFORMIN 1000 milliGRAM(s) Oral two times a day  pantoprazole    Tablet 40 milliGRAM(s) Oral before breakfast    MEDICATIONS  (PRN):  methocarbamol 750 milliGRAM(s) Oral every 8 hours PRN Muscle Spasm  oxycodone    5 mG/acetaminophen 325 mG 1 Tablet(s) Oral every 6 hours PRN Mild Pain (1 - 3)    Vital Signs Last 24 Hrs  T(F): 97.7 (04 Jun 2022 08:47), Max: 98 (03 Jun 2022 19:36)  HR: 66 (04 Jun 2022 08:47) (54 - 66)  BP: 144/74 (04 Jun 2022 08:47) (128/71 - 144/74)  RR: 18 (04 Jun 2022 08:47) (18 - 18)  SpO2: 91% (04 Jun 2022 08:47) (91% - 94%)  I&O's Summary    PHYSICAL EXAM:  General: NAD, A/O x 3  ENT: MMM, no thrush  Neck: Supple, No JVD  Lungs: Clear to auscultation bilaterally, good air entry, non-labored breathing  Cardio: +s1/s2  Abdomen: Soft, Nontender, Nondistended; Bowel sounds present  Extremities: No calf tenderness, No pitting edema    LABS:                        12.7   6.86  )-----------( 254      ( 02 Jun 2022 17:59 )             38.3     06-02    139  |  99  |  14.4  ----------------------------<  96  4.0   |  25.0  |  0.53    Ca    8.5      02 Jun 2022 17:59    TPro  6.6  /  Alb  3.7  /  TBili  0.4  /  DBili  x   /  AST  14  /  ALT  15  /  AlkPhos  76  06-02    Glucose  POCT Blood Glucose.: 182 mg/dL (04 Jun 2022 11:25)  POCT Blood Glucose.: 121 mg/dL (04 Jun 2022 06:21)  POCT Blood Glucose.: 121 mg/dL (03 Jun 2022 22:08)  POCT Blood Glucose.: 144 mg/dL (03 Jun 2022 16:33)    COVID-19 PCR: NotDetec (06-02-22 @ 17:59)    RADIOLOGY & ADDITIONAL TESTS:  - no new tests

## 2022-06-23 PROBLEM — G62.9 POLYNEUROPATHY, UNSPECIFIED: Chronic | Status: ACTIVE | Noted: 2022-06-04

## 2022-06-23 PROBLEM — M79.7 FIBROMYALGIA: Chronic | Status: ACTIVE | Noted: 2022-06-04

## 2022-06-23 PROBLEM — Z86.73 PERSONAL HISTORY OF TRANSIENT ISCHEMIC ATTACK (TIA), AND CEREBRAL INFARCTION WITHOUT RESIDUAL DEFICITS: Chronic | Status: ACTIVE | Noted: 2022-06-04

## 2022-07-05 ENCOUNTER — APPOINTMENT (OUTPATIENT)
Dept: NEUROSURGERY | Facility: CLINIC | Age: 82
End: 2022-07-05

## 2022-07-05 VITALS
OXYGEN SATURATION: 96 % | SYSTOLIC BLOOD PRESSURE: 113 MMHG | TEMPERATURE: 98.3 F | DIASTOLIC BLOOD PRESSURE: 65 MMHG | BODY MASS INDEX: 23.38 KG/M2 | HEART RATE: 59 BPM | WEIGHT: 167 LBS | HEIGHT: 71 IN

## 2022-07-05 DIAGNOSIS — M54.2 CERVICALGIA: ICD-10-CM

## 2022-07-05 PROCEDURE — 99213 OFFICE O/P EST LOW 20 MIN: CPT

## 2022-07-05 RX ORDER — MAGNESIUM HYDROXIDE 400 MG/5ML
400 SUSPENSION ORAL
Refills: 0 | Status: ACTIVE | COMMUNITY

## 2022-07-05 RX ORDER — BISACODYL 10 MG/1
10 SUPPOSITORY RECTAL
Refills: 0 | Status: ACTIVE | COMMUNITY

## 2022-07-05 RX ORDER — ACETAMINOPHEN 325 MG/1
325 TABLET, FILM COATED ORAL
Refills: 0 | Status: ACTIVE | COMMUNITY

## 2022-07-05 NOTE — PHYSICAL EXAM
[General Appearance - Alert] : alert [General Appearance - In No Acute Distress] : in no acute distress [General Appearance - Well Nourished] : well nourished [Oriented To Time, Place, And Person] : oriented to person, place, and time [Impaired Insight] : insight and judgment were intact [Affect] : the affect was normal [Motor Strength] : muscle strength was normal in all four extremities [] : no respiratory distress [Involuntary Movements] : no involuntary movements were seen [FreeTextEntry8] : wheelchair bound

## 2022-07-05 NOTE — ASSESSMENT
[FreeTextEntry1] : Patient with above history and imaging. Slight clonus on left side. Unable to stand or walk. C3, C4, C5 laminectomy offered. Patient is aware that surgery may or may not help his ability to walk. He would like to proceed with surgery. risk and benefits discussed with patient and wife, both verbalized understanding.\par \par Plan:\par OR booking\par PCP clearance- Patient to stop aspirin 1 week before surgery\par PST\par Patient knows to call the office if there are any new or worsening symptoms. \par All questions and concerns answered and addressed in detail to patient's complete satisfaction. Patient verbalized understanding and agreed to plan.\par \par
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
## 2022-07-05 NOTE — DATA REVIEWED
[de-identified] :  EXAM: MR SPINE LUMBAR\par ACC: [id number] EXAM: MR SPINE THORACIC\par ACC: [id number] EXAM: MR SPINE CERVICAL\par \par PROCEDURE DATE: 06/03/2022\par \par \par \par INTERPRETATION: MR CERVICAL SPINE WITHOUT CONTRAST\par MR THORACIC SPINE WITHOUT CONTRAST\par MR LUMBAR SPINE WITHOUT CONTRAST\par \par CLINICAL HISTORY: Back pain. Bilateral lower leg numbness.\par \par TECHNIQUE: Multiphasic sagittal non-contrast magnetic resonance imaging of the cervical, thoracic, and lumbar spine was performed. Axial sequences were also obtained.\par \par COMPARISON: MR cervical spine 11/27/2021, MR thoracic spine 11/26/2021 and MR lumbar spine 11/26/2021\par \par FINDINGS:\par \par Cervical Spine: Normal cervical lordosis is maintained. The vertebral body heights and alignment are maintained. There is no focal STIR hyperintense marrow replacing lesion.\par At C2-C3 there is minimal disc bulge and ligamentum flavum thickening which contribute to mild central canal narrowing. No underlying cord signal abnormality. There is facet hypertrophy which contribute to mild bilateral neural foraminal narrowing.\par At C3-C4 there is diffuse disc osteophyte complex and ligamentum flavum thickening which contribute to severe central canal stenosis and cord compression. There is underlying punctate cervical cord T2 signal hyperintensity consistent with myelomalacia. There is facet hypertrophy and uncovertebral spurring which contribute to moderate bilateral neural foraminal stenosis.\par At C4-C5 there is a diffuse disc osteophyte complex and ligamentum flavum thickening which contribute to moderate to severe central canal stenosis. There is no underlying cord signal abnormality. There is facet hypertrophy and uncovertebral spurring which contribute to mild to moderate right and moderate left neural foraminal stenosis.\par At C5-C6 there is a mild disc bulge which indents the ventral thecal sac and ligamentum flavum thickening which contribute to overall mild central canal stenosis. There is facet hypertrophy and uncovertebral spurring which contribute to moderate bilateral neural foraminal stenosis.\par At C6-C7 there is a mild disc bulge which indents the ventral thecal sac and contributes to mild central canal narrowing. There is facet hypertrophy which contributes to severe bilateral neural foraminal stenosis.\par \par Thoracic Spine:\par Normal thoracic kyphosis is maintained. The vertebral body heights and alignment are maintained. There is no significant disc bulge or herniation. There is no significant thoracic spinal canal stenosis. There is no significant thoracic spinal cord signal abnormality.\par At T4-T5 there is a central disc herniation which indents the ventral thecal sac. No significant associated central canal stenosis.\par At T9-T10 there is ligamentum flavum thickening and mild disc bulge which indents predominantly the dorsal thecal sac and contributes to mild to moderate central canal stenosis.\par There has been prior laminectomy with posterior decompression at T10. At T10-T11 there is a residual versus recurrent left paracentral/foraminal disc herniation which contributes to severe left neural foraminal stenosis. There is no significant central canal stenosis at T10-T11 which is markedly improved since the prior study. There is underlying punctate thoracic cord T2 signal hyperintensity which likely represents myelomalacia.\par \par Lumbar Spine: Normal lumbar lordosis is maintained. The vertebral body heights are stable. Alignment are maintained. No focal STIR hyperintense marrow replacing lesion. Persistent hyperintense signal on T1-weighted images within the T12-L2 vertebral bodies which correspond with sclerotic changes on the corresponding CT from 11/27/2021.\par \par There has been prior posterior decompression laminectomy at L2-L5. There is posterior spinal fusion at L2-S1. There are bilateral vertical rods secured with transpedicular screws at these levels.. There has been discectomy and disc spacer placement at L2-S1. There is limited evaluation secondary to hardware associated artifact.\par \par Severe disc space narrowing at T12-L1 and L1-L2.\par \par At T12-L1 there is diffuse disc osteophyte complex and ligamentum flavum thickening which contribute to moderate to severe central canal stenosis which is stable.\par At L1-L2 there is diffuse disc osteophyte complex which contributes to moderate central canal stenosis which appears mildly worse since the prior study.\par There is no significant central canal stenosis from L2 to S1.\par \par IMPRESSION:\par MR cervical spine: Degenerative changes as described. High-grade multilevel central canal and neural foraminal stenosis without significant interval change.\par \par MR thoracic spine: There has been posterior decompression at T10 since the prior MRI from 11/26/2021. There is hyperintense cord signal at T10-T11 which likely represents myelomalacia. No high-grade central canal stenosis throughout the thoracic spine.\par \par MR lumbar spine: Postoperative and degenerative changes as described. At T12-L1 there is moderate to severe central canal stenosis which is stable. At L1-L2 there is moderate central canal stenosis which appears mildly worse since the prior study.

## 2022-07-12 ENCOUNTER — NON-APPOINTMENT (OUTPATIENT)
Age: 82
End: 2022-07-12

## 2022-07-18 ENCOUNTER — APPOINTMENT (OUTPATIENT)
Dept: NEUROSURGERY | Facility: HOSPITAL | Age: 82
End: 2022-07-18

## 2022-08-03 NOTE — ED CDU PROVIDER SUBSEQUENT DAY NOTE - HISTORY
pt well appearing overnight, pain controlled, CT neck + mild degenerative changes
Universal Safety Interventions

## 2022-08-25 NOTE — DISCHARGE NOTE PROVIDER - NPI NUMBER (FOR [employer] USE ONLY) :
Quality 130: Documentation Of Current Medications In The Medical Record: Current Medications Documented
Quality 226: Preventive Care And Screening: Tobacco Use: Screening And Cessation Intervention: Patient screened for tobacco use and is an ex/non-smoker
Quality 110: Preventive Care And Screening: Influenza Immunization: Influenza Immunization not Administered because Patient Refused.
Detail Level: Detailed
Quality 431: Preventive Care And Screening: Unhealthy Alcohol Use - Screening: Patient not identified as an unhealthy alcohol user when screened for unhealthy alcohol use using a systematic screening method
[[id number]],[UNKNOWN]

## 2022-09-22 NOTE — DIETITIAN INITIAL EVALUATION ADULT. - PERTINENT LABORATORY DATA
The PA catheter was reinserted into the pulmonary wedge. Hemodynamics were performed. POCT glucose x 4: 110-144 (H)  12-30-20: Glu 115 (H)   12-27-20: Na 134 (L), BUN 22 (H), Cholesterol 125, Triglycerides 198 (H), HDL 39 (L), HbA1c 5.9 (H)

## 2022-11-30 NOTE — ASU PATIENT PROFILE, ADULT - FALL HARM RISK - HARM RISK INTERVENTIONS

## 2022-12-02 RX ORDER — SODIUM CHLORIDE 9 MG/ML
3 INJECTION INTRAMUSCULAR; INTRAVENOUS; SUBCUTANEOUS EVERY 8 HOURS
Refills: 0 | Status: DISCONTINUED | OUTPATIENT
Start: 2022-12-15 | End: 2022-12-16

## 2022-12-02 NOTE — PATIENT PROFILE ADULT - FALL HARM RISK - HARM RISK INTERVENTIONS

## 2022-12-02 NOTE — PATIENT PROFILE ADULT - NSPROPTRIGHTNOTIFY_GEN_A_NUR
I will SWITCH the dose or number of times a day I take the medications listed below when I get home from the hospital:  None declines

## 2022-12-02 NOTE — PATIENT PROFILE ADULT - ARE SIGNIFICANT INDICATORS COMPLETE.
SUBJECTIVE:   CC: Feng Alvarez is an 25 year old male who presents for preventative health visit.     Physical   Annual:     Getting at least 3 servings of Calcium per day:  Yes    Bi-annual eye exam:  NO    Dental care twice a year:  Yes    Sleep apnea or symptoms of sleep apnea:  None    Diet:  Regular (no restrictions)    Frequency of exercise:  4-5 days/week    Duration of exercise:  45-60 minutes    Taking medications regularly:  Yes    Medication side effects:  Not applicable    Additional concerns today:  No    past medical history - none  Medications - none  Allergies - none  Fx  Dad, aunt, gp - DM  No colon cancer or prostate cancer  Surgery - none  social - going to [employer] school  Tobacco - none  Alcohol - socially  Exercise - lifting and running 4-5x per week  Diet - overall healthy    Today's PHQ-2 Score:   PHQ-2 ( 1999 [employer]) 8/2/2018   Q1: Little interest or pleasure in doing things 0   Q2: Feeling down, depressed or hopeless 0   PHQ-2 Score 0   Q1: Little interest or pleasure in doing things Not at all   Q2: Feeling down, depressed or hopeless Not at all   PHQ-2 Score 0       Abuse: Current or Past(Physical, Sexual or Emotional)- No  Do you feel safe in your environment - Yes    Social History   Substance Use Topics     Smoking status: Never Smoker     Smokeless tobacco: Never Used     Alcohol use Yes      Comment: Occ     Alcohol Use 8/2/2018   If you drink alcohol do you typically have greater than 3 drinks per day OR greater than 7 drinks per week? No       Last PSA: No results found for: PSA    Reviewed orders with patient. Reviewed health maintenance and updated orders accordingly - Yes  BP Readings from Last 3 Encounters:   08/02/18 114/76    Wt Readings from Last 3 Encounters:   08/02/18 208 lb (94.3 kg)        Reviewed and updated as needed this visit by clinical staff  Tobacco  Allergies  Meds  Problems  Soc Hx        Reviewed and updated as needed this visit by  "Provider  Allergies  Meds  Problems        No past medical history on file.     Review of Systems   Constitutional: Negative for chills and fever.   HENT: Negative for congestion, ear pain, hearing loss and sore throat.    Eyes: Negative for pain and visual disturbance.   Respiratory: Negative for cough and shortness of breath.    Cardiovascular: Negative for chest pain, palpitations and peripheral edema.   Gastrointestinal: Negative for abdominal pain, constipation, diarrhea, heartburn, hematochezia and nausea.   Genitourinary: Negative for discharge, dysuria, frequency, genital sores, hematuria, impotence and urgency.   Musculoskeletal: Positive for arthralgias. Negative for joint swelling and myalgias.   Skin: Negative for rash.   Neurological: Negative for dizziness, weakness, headaches and paresthesias.   Psychiatric/Behavioral: Negative for mood changes. The patient is not nervous/anxious.      CONSTITUTIONAL: NEGATIVE for fever, chills, change in weight  INTEGUMENTARY/SKIN: NEGATIVE for worrisome rashes, moles or lesions  EYES: NEGATIVE for vision changes or irritation  ENT: NEGATIVE for ear, mouth and throat problems  RESP: NEGATIVE for significant cough or SOB  CV: NEGATIVE for chest pain, palpitations or peripheral edema  GI: NEGATIVE for nausea, abdominal pain, heartburn, or change in bowel habits   male: negative for dysuria, hematuria, decreased urinary stream, erectile dysfunction, urethral discharge  MUSCULOSKELETAL: NEGATIVE for significant arthralgias or myalgia  NEURO: NEGATIVE for weakness, dizziness or paresthesias  PSYCHIATRIC: NEGATIVE for changes in mood or affect    OBJECTIVE:   /76  Pulse 67  Temp 98.7  F (37.1  C) (Oral)  Resp 16  Ht 5' 11.61\" (1.819 m)  Wt 208 lb (94.3 kg)  SpO2 96%  BMI 28.51 kg/m2    Physical Exam  GENERAL: healthy, alert and no distress  EYES: Eyes grossly normal to inspection, PERRL and conjunctivae and sclerae normal  HENT: ear canals and TM's " "normal, nose and mouth without ulcers or lesions  NECK: no adenopathy, no asymmetry, masses, or scars and thyroid normal to palpation  RESP: lungs clear to auscultation - no rales, rhonchi or wheezes  CV: regular rate and rhythm, normal S1 S2, no S3 or S4, no murmur, click or rub, no peripheral edema and peripheral pulses strong  ABDOMEN: soft, nontender, no hepatosplenomegaly, no masses and bowel sounds normal  MS: no gross musculoskeletal defects noted, no edema  SKIN: no suspicious lesions or rashes  NEURO: Normal strength and tone, mentation intact and speech normal  PSYCH: mentation appears normal, affect normal/bright     ASSESSMENT/PLAN:   1. Routine general medical examination at a health care facility  Health maintenance updated  - Lipid panel reflex to direct LDL Fasting  - Basic metabolic panel  (Ca, Cl, CO2, Creat, Gluc, K, Na, BUN)    2. Encounter for lipid screening for cardiovascular disease  - Lipid panel reflex to direct LDL Fasting  - Basic metabolic panel  (Ca, Cl, CO2, Creat, Gluc, K, Na, BUN)    3. BMI 28.0-28.9,adult      COUNSELING:   Reviewed preventive health counseling, as reflected in patient instructions       Regular exercise       Healthy diet/nutrition    BP Readings from Last 1 Encounters:   08/02/18 114/76     Estimated body mass index is 28.51 kg/(m^2) as calculated from the following:    Height as of this encounter: 5' 11.61\" (1.819 m).    Weight as of this encounter: 208 lb (94.3 kg).           reports that he has never smoked. He has never used smokeless tobacco.      Counseling Resources:  ATP IV Guidelines  Pooled Cohorts Equation Calculator  FRAX Risk Assessment  ICSI Preventive Guidelines  Dietary Guidelines for Americans, 2010  USDA's MyPlate  ASA Prophylaxis  Lung CA Screening    Chuy Piper MD  [facility]  " No Yes

## 2022-12-04 ENCOUNTER — INPATIENT (INPATIENT)
Facility: HOSPITAL | Age: 82
LOS: 11 days | Discharge: HOME | DRG: 519 | End: 2022-12-16
Attending: NEUROLOGICAL SURGERY | Admitting: NEUROLOGICAL SURGERY
Payer: MEDICARE

## 2022-12-04 VITALS
OXYGEN SATURATION: 98 % | SYSTOLIC BLOOD PRESSURE: 137 MMHG | TEMPERATURE: 98 F | DIASTOLIC BLOOD PRESSURE: 78 MMHG | HEART RATE: 78 BPM

## 2022-12-04 DIAGNOSIS — M50.03 CERVICAL DISC DISORDER WITH MYELOPATHY, CERVICOTHORACIC REGION: ICD-10-CM

## 2022-12-04 DIAGNOSIS — Z95.1 PRESENCE OF AORTOCORONARY BYPASS GRAFT: Chronic | ICD-10-CM

## 2022-12-04 LAB
GLUCOSE BLDC GLUCOMTR-MCNC: 120 MG/DL — HIGH (ref 70–99)
GLUCOSE BLDC GLUCOMTR-MCNC: 148 MG/DL — HIGH (ref 70–99)

## 2022-12-04 PROCEDURE — 99222 1ST HOSP IP/OBS MODERATE 55: CPT

## 2022-12-04 PROCEDURE — 99221 1ST HOSP IP/OBS SF/LOW 40: CPT | Mod: FS

## 2022-12-04 PROCEDURE — 93010 ELECTROCARDIOGRAM REPORT: CPT

## 2022-12-04 PROCEDURE — 71046 X-RAY EXAM CHEST 2 VIEWS: CPT | Mod: 26

## 2022-12-04 RX ORDER — DEXTROSE 50 % IN WATER 50 %
15 SYRINGE (ML) INTRAVENOUS ONCE
Refills: 0 | Status: DISCONTINUED | OUTPATIENT
Start: 2022-12-04 | End: 2022-12-12

## 2022-12-04 RX ORDER — ATENOLOL 25 MG/1
50 TABLET ORAL DAILY
Refills: 0 | Status: DISCONTINUED | OUTPATIENT
Start: 2022-12-04 | End: 2022-12-12

## 2022-12-04 RX ORDER — OXYCODONE AND ACETAMINOPHEN 5; 325 MG/1; MG/1
1 TABLET ORAL EVERY 6 HOURS
Refills: 0 | Status: DISCONTINUED | OUTPATIENT
Start: 2022-12-04 | End: 2022-12-04

## 2022-12-04 RX ORDER — AMLODIPINE BESYLATE 2.5 MG/1
5 TABLET ORAL DAILY
Refills: 0 | Status: DISCONTINUED | OUTPATIENT
Start: 2022-12-04 | End: 2022-12-12

## 2022-12-04 RX ORDER — POLYETHYLENE GLYCOL 3350 17 G/17G
17 POWDER, FOR SOLUTION ORAL DAILY
Refills: 0 | Status: DISCONTINUED | OUTPATIENT
Start: 2022-12-04 | End: 2022-12-12

## 2022-12-04 RX ORDER — GABAPENTIN 400 MG/1
300 CAPSULE ORAL THREE TIMES A DAY
Refills: 0 | Status: DISCONTINUED | OUTPATIENT
Start: 2022-12-04 | End: 2022-12-12

## 2022-12-04 RX ORDER — DULOXETINE HYDROCHLORIDE 30 MG/1
20 CAPSULE, DELAYED RELEASE ORAL DAILY
Refills: 0 | Status: DISCONTINUED | OUTPATIENT
Start: 2022-12-04 | End: 2022-12-12

## 2022-12-04 RX ORDER — DEXTROSE 50 % IN WATER 50 %
25 SYRINGE (ML) INTRAVENOUS ONCE
Refills: 0 | Status: DISCONTINUED | OUTPATIENT
Start: 2022-12-04 | End: 2022-12-12

## 2022-12-04 RX ORDER — LISINOPRIL 2.5 MG/1
20 TABLET ORAL DAILY
Refills: 0 | Status: DISCONTINUED | OUTPATIENT
Start: 2022-12-04 | End: 2022-12-12

## 2022-12-04 RX ORDER — GLUCAGON INJECTION, SOLUTION 0.5 MG/.1ML
1 INJECTION, SOLUTION SUBCUTANEOUS ONCE
Refills: 0 | Status: DISCONTINUED | OUTPATIENT
Start: 2022-12-04 | End: 2022-12-12

## 2022-12-04 RX ORDER — LIDOCAINE 4 G/100G
1 CREAM TOPICAL EVERY 24 HOURS
Refills: 0 | Status: DISCONTINUED | OUTPATIENT
Start: 2022-12-04 | End: 2022-12-12

## 2022-12-04 RX ORDER — INSULIN LISPRO 100/ML
VIAL (ML) SUBCUTANEOUS
Refills: 0 | Status: DISCONTINUED | OUTPATIENT
Start: 2022-12-04 | End: 2022-12-12

## 2022-12-04 RX ORDER — PANTOPRAZOLE SODIUM 20 MG/1
40 TABLET, DELAYED RELEASE ORAL
Refills: 0 | Status: DISCONTINUED | OUTPATIENT
Start: 2022-12-04 | End: 2022-12-12

## 2022-12-04 RX ORDER — ATORVASTATIN CALCIUM 80 MG/1
80 TABLET, FILM COATED ORAL AT BEDTIME
Refills: 0 | Status: DISCONTINUED | OUTPATIENT
Start: 2022-12-04 | End: 2022-12-12

## 2022-12-04 RX ORDER — METHOCARBAMOL 500 MG/1
750 TABLET, FILM COATED ORAL EVERY 8 HOURS
Refills: 0 | Status: DISCONTINUED | OUTPATIENT
Start: 2022-12-04 | End: 2022-12-12

## 2022-12-04 RX ADMIN — AMLODIPINE BESYLATE 5 MILLIGRAM(S): 2.5 TABLET ORAL at 17:54

## 2022-12-04 RX ADMIN — PANTOPRAZOLE SODIUM 40 MILLIGRAM(S): 20 TABLET, DELAYED RELEASE ORAL at 17:54

## 2022-12-04 RX ADMIN — ATENOLOL 50 MILLIGRAM(S): 25 TABLET ORAL at 17:54

## 2022-12-04 RX ADMIN — ATORVASTATIN CALCIUM 80 MILLIGRAM(S): 80 TABLET, FILM COATED ORAL at 22:00

## 2022-12-04 RX ADMIN — GABAPENTIN 300 MILLIGRAM(S): 400 CAPSULE ORAL at 22:00

## 2022-12-04 RX ADMIN — DULOXETINE HYDROCHLORIDE 20 MILLIGRAM(S): 30 CAPSULE, DELAYED RELEASE ORAL at 17:54

## 2022-12-04 RX ADMIN — LISINOPRIL 20 MILLIGRAM(S): 2.5 TABLET ORAL at 17:54

## 2022-12-04 RX ADMIN — Medication 1 DROP(S): at 22:01
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
## 2022-12-04 NOTE — H&P ADULT - NSHPPHYSICALEXAM_GEN_ALL_CORE
General: Well-developed, well nourished, in no acute distress.  Eyes: Fundoscopic examination was deferred.  Neurologic:  - Mental Status:  Alert, awake, oriented to person, place, and time; Speech is fluent. Language is normal. Follows commands well.    Cranial Nerves II-XII:    II:  Visual acuity is normal for age ; Visual fields are full to confrontation; Pupils are equal, round, and reactive to light.  III, IV, VI:  Extraocular movements are intact without nystagmus.  V:  Facial sensation is intact in the V1-V3 distribution bilaterally.  VII:  Face is symmetric with normal eye closure and smile  VIII:  Hearing is grossly intact  IX, X, XII:  speech is clear  XI:  Head turning and shoulder shrug are intact.  - Motor:  Strength is 5/5 x 4.   There is no pronator drift.  Uppers     Delt     Bicep     Tricep     HG  R                5/5       5/5         5/5         5/5  L                5/5       5/5         5/5         5/5  Lowers      HF     KF      KE     PF     DF     EHL  R             5/5     5/5     5/5    5/5   5/5    5/5  L              5/5    5/5      5/5    5/5   5/5    5/5  - Reflexes:  No velasquez's, no clonus  - Sensory:  Symmetric to light touch

## 2022-12-04 NOTE — H&P ADULT - HISTORY OF PRESENT ILLNESS
81 y/o male scheduled for planned surgery on 12/5/22. Pt states he has an unsteady gait secondary to cervical stenosis, multiple falls, currently residing at [facility].

## 2022-12-04 NOTE — CONSULT NOTE ADULT - ASSESSMENT
80 y/o M with hx of HTN, HLD, DM2, CAD s/p CABG, chronic back pain with multiple sx and history of laminectomy and fusion, admitted in June for worsening acute on chronic neck pain with radiation to b/l shoulder bladed and down the upper and mid back, had ct and mri done at that time showed degenerative changes as well as high-grade multilevel central canal and neural foraminal stenosis without significant interval change from prior studies, patient was given muscle relaxant and pain meds, seen  by neurosurgery team at the time recommended no intervention and follow up in the office and was discharged to Rehab, patient followed them in the office now comes in for elective cervical spine surgery, medicine team consulted for the optimization of procedure.     Plan:       Cervical spinal stenosis:   Admitted under Neuro surgery  Pain meds   Neuro checks   routein labs  EKG  PT eval post op  Bowel meds   IV fluids if NPO   Patient was on oxycodone-acetaminophen 5 mg-325 mg every 6 hours, As needed, lidocaine 4% topical film: Apply topically to affected area every 24 hours and methocarbamol 750 mg oral tablet: 1 tab(s) orally every 8 hours, As needed, Muscle Spasm      Hx of CAD: Will hold aspirin until ok from Neurosurgery.      Peripheral neuropathy  -c/w gabapentin po tid     HTN:     -c/w Norvasc 5mg po qd   -c/w lisinopril 20mg po qd   -c/w atenolol 50 po qd   with holding parameters     HLD:     -c/w with atorvastatin 80mg daily.     DM2   -fs stable   -c/w accuchecks achs tid   -c/w hypoglycemia protocol   Hold metformin while here in the hospital    Depression   -c/w duloxetine po qd     GERD: protonix    DVT ppx  as per primary team      80 y/o M with hx of HTN, HLD, DM2, CAD s/p CABG, chronic back pain with multiple sx and history of laminectomy and fusion, admitted in June for worsening acute on chronic neck pain with radiation to b/l shoulder bladed and down the upper and mid back, had ct and mri done at that time showed degenerative changes as well as high-grade multilevel central canal and neural foraminal stenosis without significant interval change from prior studies, patient was given muscle relaxant and pain meds, seen  by neurosurgery team at the time recommended no intervention and follow up in the office and was discharged to Rehab, patient followed them in the office now comes in for elective cervical spine surgery, medicine team consulted for the optimization of procedure,  patient is mostly wheel chair bound, denies any chest pain or sob, patient has Hx of TIA in the past last one was 2 years ago, cardiac stents last one 2 years ago and was taking aspirin, previous surgeries without any complications, patient's METS is 1, RCRI is 3, patient EKG shows Sinus jesus with RBBB, last TTE done in 2021 at per Cardilogist chart showed LVH, normal EF, Moderate TR and Moderate MR, MPI in April 2017 was normal study, patient has been cleared by his Cardiologist, patient is high risk for perioperative cardiovascular complications and is optimized from the medicine point of view for planned procedure, need to see routien labs before proceeding for surgery.     Plan:       Cervical spinal stenosis:   Admitted under Neuro surgery  Pain meds   Neuro checks   routein labs  PT eval post op  Bowel meds   optimized from the medicine point of view for planned procedure, need to see routine labs before proceeding for surgery.   IV fluids if NPO   Patient was on oxycodone-acetaminophen 5 mg-325 mg every 6 hours, As needed, lidocaine 4% topical film: Apply topically to affected area every 24 hours and methocarbamol 750 mg oral tablet: 1 tab(s) orally every 8 hours, As needed, Muscle Spasm  Would recommand resume aspirin post op one ok from the Neurosurgery point ov view    Hx of CAD: Will hold aspirin until ok from Neurosurgery.      Peripheral neuropathy  -c/w gabapentin 300mg po tid     HTN:     -c/w Norvasc 5mg po qd   -c/w lisinopril 20mg po qd   -c/w atenolol 50 po qd   with holding parameters     HLD:     -c/w with atorvastatin 80mg daily.     DM2   -fs stable   -c/w accuchecks achs tid   -c/w hypoglycemia protocol   Hold metformin while here in the hospital    Depression   -c/w duloxetine po qd     GERD: protonix 40mg daily     Hx of Hyponatremia: on salt tablets 1gm daily, Monitor BMP     Hx of Constipation: he has been on Miralax, Milk of mag, Bisacodyl daily and Fleet enema as needed at rehab center   DVT ppx  as per primary team

## 2022-12-04 NOTE — H&P ADULT - ASSESSMENT
83 y/o male with cervical stenosis, scheduled for surgery on 12/5/22    1. On ASA 81 mg, last dose was this morning, at the nursing facility. It has now been discontinued  2. Medical consult for medical clearance. Dr Basilio called   3. Cardiac  clearance done on 11/30 by Dr White  4. Advance diet  5. Admission blood work ordered  6. Neuro checks Q 4

## 2022-12-05 ENCOUNTER — TRANSCRIPTION ENCOUNTER (OUTPATIENT)
Age: 82
End: 2022-12-05

## 2022-12-05 LAB
A1C WITH ESTIMATED AVERAGE GLUCOSE RESULT: 6.5 % — HIGH (ref 4–5.6)
ALBUMIN SERPL ELPH-MCNC: 3.9 G/DL — SIGNIFICANT CHANGE UP (ref 3.3–5.2)
ALP SERPL-CCNC: 68 U/L — SIGNIFICANT CHANGE UP (ref 40–120)
ALT FLD-CCNC: 15 U/L — SIGNIFICANT CHANGE UP
ANION GAP SERPL CALC-SCNC: 11 MMOL/L — SIGNIFICANT CHANGE UP (ref 5–17)
APTT BLD: 31.9 SEC — SIGNIFICANT CHANGE UP (ref 27.5–35.5)
AST SERPL-CCNC: 15 U/L — SIGNIFICANT CHANGE UP
BASOPHILS # BLD AUTO: 0.04 K/UL — SIGNIFICANT CHANGE UP (ref 0–0.2)
BASOPHILS NFR BLD AUTO: 0.5 % — SIGNIFICANT CHANGE UP (ref 0–2)
BILIRUB SERPL-MCNC: 0.4 MG/DL — SIGNIFICANT CHANGE UP (ref 0.4–2)
BLD GP AB SCN SERPL QL: SIGNIFICANT CHANGE UP
BUN SERPL-MCNC: 17.1 MG/DL — SIGNIFICANT CHANGE UP (ref 8–20)
CALCIUM SERPL-MCNC: 9 MG/DL — SIGNIFICANT CHANGE UP (ref 8.4–10.5)
CHLORIDE SERPL-SCNC: 99 MMOL/L — SIGNIFICANT CHANGE UP (ref 96–108)
CO2 SERPL-SCNC: 26 MMOL/L — SIGNIFICANT CHANGE UP (ref 22–29)
CREAT SERPL-MCNC: 0.76 MG/DL — SIGNIFICANT CHANGE UP (ref 0.5–1.3)
EGFR: 90 ML/MIN/1.73M2 — SIGNIFICANT CHANGE UP
EOSINOPHIL # BLD AUTO: 0.13 K/UL — SIGNIFICANT CHANGE UP (ref 0–0.5)
EOSINOPHIL NFR BLD AUTO: 1.8 % — SIGNIFICANT CHANGE UP (ref 0–6)
ESTIMATED AVERAGE GLUCOSE: 140 MG/DL — HIGH (ref 68–114)
GLUCOSE BLDC GLUCOMTR-MCNC: 100 MG/DL — HIGH (ref 70–99)
GLUCOSE BLDC GLUCOMTR-MCNC: 148 MG/DL — HIGH (ref 70–99)
GLUCOSE SERPL-MCNC: 105 MG/DL — HIGH (ref 70–99)
HCT VFR BLD CALC: 39.4 % — SIGNIFICANT CHANGE UP (ref 39–50)
HGB BLD-MCNC: 12.7 G/DL — LOW (ref 13–17)
IMM GRANULOCYTES NFR BLD AUTO: 0.3 % — SIGNIFICANT CHANGE UP (ref 0–0.9)
INR BLD: 1.08 RATIO — SIGNIFICANT CHANGE UP (ref 0.88–1.16)
LYMPHOCYTES # BLD AUTO: 1.4 K/UL — SIGNIFICANT CHANGE UP (ref 1–3.3)
LYMPHOCYTES # BLD AUTO: 19 % — SIGNIFICANT CHANGE UP (ref 13–44)
MAGNESIUM SERPL-MCNC: 1.5 MG/DL — LOW (ref 1.6–2.6)
MCHC RBC-ENTMCNC: 28.9 PG — SIGNIFICANT CHANGE UP (ref 27–34)
MCHC RBC-ENTMCNC: 32.2 GM/DL — SIGNIFICANT CHANGE UP (ref 32–36)
MCV RBC AUTO: 89.7 FL — SIGNIFICANT CHANGE UP (ref 80–100)
MONOCYTES # BLD AUTO: 0.77 K/UL — SIGNIFICANT CHANGE UP (ref 0–0.9)
MONOCYTES NFR BLD AUTO: 10.5 % — SIGNIFICANT CHANGE UP (ref 2–14)
NEUTROPHILS # BLD AUTO: 5 K/UL — SIGNIFICANT CHANGE UP (ref 1.8–7.4)
NEUTROPHILS NFR BLD AUTO: 67.9 % — SIGNIFICANT CHANGE UP (ref 43–77)
PLATELET # BLD AUTO: 252 K/UL — SIGNIFICANT CHANGE UP (ref 150–400)
POTASSIUM SERPL-MCNC: 4.6 MMOL/L — SIGNIFICANT CHANGE UP (ref 3.5–5.3)
POTASSIUM SERPL-SCNC: 4.6 MMOL/L — SIGNIFICANT CHANGE UP (ref 3.5–5.3)
PROT SERPL-MCNC: 6.7 G/DL — SIGNIFICANT CHANGE UP (ref 6.6–8.7)
PROTHROM AB SERPL-ACNC: 12.5 SEC — SIGNIFICANT CHANGE UP (ref 10.5–13.4)
RBC # BLD: 4.39 M/UL — SIGNIFICANT CHANGE UP (ref 4.2–5.8)
RBC # FLD: 14.6 % — HIGH (ref 10.3–14.5)
SARS-COV-2 RNA SPEC QL NAA+PROBE: SIGNIFICANT CHANGE UP
SODIUM SERPL-SCNC: 136 MMOL/L — SIGNIFICANT CHANGE UP (ref 135–145)
WBC # BLD: 7.36 K/UL — SIGNIFICANT CHANGE UP (ref 3.8–10.5)
WBC # FLD AUTO: 7.36 K/UL — SIGNIFICANT CHANGE UP (ref 3.8–10.5)

## 2022-12-05 PROCEDURE — 99232 SBSQ HOSP IP/OBS MODERATE 35: CPT

## 2022-12-05 RX ORDER — MAGNESIUM SULFATE 500 MG/ML
2 VIAL (ML) INJECTION ONCE
Refills: 0 | Status: COMPLETED | OUTPATIENT
Start: 2022-12-05 | End: 2022-12-05

## 2022-12-05 RX ADMIN — GABAPENTIN 300 MILLIGRAM(S): 400 CAPSULE ORAL at 06:10

## 2022-12-05 RX ADMIN — ATENOLOL 50 MILLIGRAM(S): 25 TABLET ORAL at 06:11

## 2022-12-05 RX ADMIN — Medication 1 DROP(S): at 14:41

## 2022-12-05 RX ADMIN — Medication 1 DROP(S): at 06:11

## 2022-12-05 RX ADMIN — GABAPENTIN 300 MILLIGRAM(S): 400 CAPSULE ORAL at 21:41

## 2022-12-05 RX ADMIN — Medication 25 GRAM(S): at 15:16

## 2022-12-05 RX ADMIN — LISINOPRIL 20 MILLIGRAM(S): 2.5 TABLET ORAL at 06:10

## 2022-12-05 RX ADMIN — GABAPENTIN 300 MILLIGRAM(S): 400 CAPSULE ORAL at 14:41

## 2022-12-05 RX ADMIN — DULOXETINE HYDROCHLORIDE 20 MILLIGRAM(S): 30 CAPSULE, DELAYED RELEASE ORAL at 12:27

## 2022-12-05 RX ADMIN — Medication 1 DROP(S): at 21:36

## 2022-12-05 RX ADMIN — AMLODIPINE BESYLATE 5 MILLIGRAM(S): 2.5 TABLET ORAL at 06:09

## 2022-12-05 RX ADMIN — ATORVASTATIN CALCIUM 80 MILLIGRAM(S): 80 TABLET, FILM COATED ORAL at 21:31

## 2022-12-05 RX ADMIN — PANTOPRAZOLE SODIUM 40 MILLIGRAM(S): 20 TABLET, DELAYED RELEASE ORAL at 06:11

## 2022-12-05 NOTE — DISCHARGE NOTE PROVIDER - NSDCACTIVITY_GEN_ALL_CORE
No heavy lifting/straining Do not drive or operate machinery/Showering allowed/No heavy lifting/straining

## 2022-12-05 NOTE — PROGRESS NOTE ADULT - SUBJECTIVE AND OBJECTIVE BOX
INTERVAL HPI/OVERNIGHT EVENTS:  This is a 82ym admitted on 12/4 from [facility] with hx of cervical stenosis and hx of lumbar fusion. Pt is admitted with unsteady gait which was attributed to cervical stenosis. Pt was initially scheduled for cervical decompression which was delayed. Pt has been taking asa 81mg.   Today: pt was eagerly awaiting to be taken to the operative suite to have his cervical surg completed. Pt states that during his rehab he would ambulate occas with someone holding the walker. Pt states that his neck is painful, he has numbness in the right hand.   Pmhx: HTN, HLD, DM2, CAD  MEDICATIONS  (STANDING):  amLODIPine   Tablet 5 milliGRAM(s) Oral daily  artificial  tears Solution 1 Drop(s) Both EYES three times a day  atenolol  Tablet 50 milliGRAM(s) Oral daily  atorvastatin 80 milliGRAM(s) Oral at bedtime  dextrose 50% Injectable 25 Gram(s) IV Push once  DULoxetine 20 milliGRAM(s) Oral daily  gabapentin 300 milliGRAM(s) Oral three times a day  glucagon  Injectable 1 milliGRAM(s) IntraMuscular once  insulin lispro (ADMELOG) corrective regimen sliding scale   SubCutaneous three times a day before meals  lidocaine   4% Patch 1 Patch Transdermal every 24 hours  lisinopril 20 milliGRAM(s) Oral daily  magnesium sulfate  IVPB 2 Gram(s) IV Intermittent once  pantoprazole    Tablet 40 milliGRAM(s) Oral before breakfast    MEDICATIONS  (PRN):  bisacodyl Suppository 10 milliGRAM(s) Rectal daily PRN Constipation  dextrose Oral Gel 15 Gram(s) Oral once PRN Blood Glucose LESS THAN 70 milliGRAM(s)/deciliter  methocarbamol 750 milliGRAM(s) Oral every 8 hours PRN Muscle Spasm  oxycodone    5 mG/acetaminophen 325 mG 1 Tablet(s) Oral every 6 hours PRN Mild Pain (1 - 3)  polyethylene glycol 3350 17 Gram(s) Oral daily PRN Constipation    Allergies  No Known Allergies  Intolerances    Vital Signs Last 24 Hrs  T(C): 36.4 (05 Dec 2022 08:36), Max: 36.7 (04 Dec 2022 13:00)  T(F): 97.5 (05 Dec 2022 08:36), Max: 98 (04 Dec 2022 13:00)  HR: 51 (05 Dec 2022 08:36) (51 - 78)  BP: 136/67 (05 Dec 2022 08:36) (127/69 - 154/70)  BP(mean): --  RR: 18 (05 Dec 2022 08:36) (18 - 18)  SpO2: 95% (05 Dec 2022 08:36) (95% - 98%)    Parameters below as of 05 Dec 2022 08:36  Patient On (Oxygen Delivery Method): room air     BMI (kg/m2): 54 (12-04-22 @ 14:45)      PHYSICAL EXAM  [id number]= 15  GENERAL: NAD,  HEAD:  Atraumatic, Normocephalic  EYES: EOMI, PERRLA, conjunctiva and sclera clear  ENMT: No tonsillar erythema, exudates, or enlargement; Moist mucous membranes, Good dentition, No lesions, neg facial  NECK: Supple,  NERVOUS SYSTEM:  Alert & Oriented X3, Good concentration; Motor Strength 5/5 B/L upper and lower extremities; DTRs 2+ intact and symmetric  CHEST/LUNG: Clear bs bilat; No rales, rhonchi, wheezing, or rubs  HEART: Regular rate and rhythm; No murmurs, rubs, or gallops  ABDOMEN: Soft, Nontender, Nondistended; Bowel sounds present  EXTREMITIES:  2+ Peripheral Pulses, No edema  LYMPH: No lymphadenopathy noted  Skin: pos abrasion of the anterior post patellar region.       LABS:                          12.7   7.36  )-----------( 252      ( 05 Dec 2022 07:20 )             39.4     12-05    136  |  99  |  17.1  ----------------------------<  105<H>  4.6   |  26.0  |  0.76    Ca    9.0      05 Dec 2022 07:20  Mg     1.5     12-05    TPro  6.7  /  Alb  3.9  /  TBili  0.4  /  DBili  x   /  AST  15  /  ALT  15  /  AlkPhos  68  12-05    PT/INR - ( 05 Dec 2022 07:20 )   PT: 12.5 sec;   INR: 1.08 ratio         PTT - ( 05 Dec 2022 07:20 )  PTT:31.9 sec    I&O's Detail    04 Dec 2022 07:01  -  05 Dec 2022 07:00  --------------------------------------------------------  IN:  Total IN: 0 mL    OUT:    Voided (mL): 900 mL  Total OUT: 900 mL    Total NET: -900 mL    RADIOLOGY & ADDITIONAL TESTS:  < from: CT Cervical Spine No Cont (06.02.22 @ 23:56) >           IMPRESSION:  Multilevel degenerative changes of the cervical spine without evidence of   fracture.  --- End of Report ---  < from: MR Cervical Spine No Cont (06.03.22 @ 19:02) >    ACC: [id number] EXAM:  MR SPINE LUMBAR                        ACC: [id number] EXAM:  MR SPINE THORACIC                        ACC: [id number] EXAM:  MR SPINE CERVICAL                        PROCEDURE DATE:  06/03/2022    < from: MR Cervical Spine No Cont (06.03.22 @ 19:02) >  IMPRESSION:  MR cervical spine: Degenerative changes as described. High-grade   multilevel central canal and neural foraminal stenosis without   significant interval change.    MR thoracicspine: There has been posterior decompression at T10 since   the prior MRI from 11/26/2021. There is hyperintense cord signal at   T10-T11 which likely represents myelomalacia. No high-grade central canal   stenosis throughout the thoracic spine.    MR lumbar spine: Postoperative and degenerative changes as described. At   T12-L1 there is moderate to severe central canal stenosis which is   stable. At L1-L2 there is moderate central canal stenosis which appears   mildly worse since the prior study.  --- End of Report ---  < end of copied text >   INTERVAL HPI/OVERNIGHT EVENTS:  This is a 82ym admitted on 12/4 from [facility] with hx of cervical stenosis and hx of lumbar fusion. Pt is admitted with unsteady gait which was attributed to cervical stenosis. Pt was initially scheduled for cervical decompression which was delayed. Pt has been taking asa 81mg.   Today: pt was eagerly awaiting to be taken to the operative suite to have his cervical surg completed. Pt states that during his rehab he would ambulate occas with someone holding the walker. Pt states that his neck is painful, he has numbness in the right hand.   Pmhx: HTN, HLD, DM2, CAD  MEDICATIONS  (STANDING):  amLODIPine   Tablet 5 milliGRAM(s) Oral daily  artificial  tears Solution 1 Drop(s) Both EYES three times a day  atenolol  Tablet 50 milliGRAM(s) Oral daily  atorvastatin 80 milliGRAM(s) Oral at bedtime  dextrose 50% Injectable 25 Gram(s) IV Push once  DULoxetine 20 milliGRAM(s) Oral daily  gabapentin 300 milliGRAM(s) Oral three times a day  glucagon  Injectable 1 milliGRAM(s) IntraMuscular once  insulin lispro (ADMELOG) corrective regimen sliding scale   SubCutaneous three times a day before meals  lidocaine   4% Patch 1 Patch Transdermal every 24 hours  lisinopril 20 milliGRAM(s) Oral daily  magnesium sulfate  IVPB 2 Gram(s) IV Intermittent once  pantoprazole    Tablet 40 milliGRAM(s) Oral before breakfast    MEDICATIONS  (PRN):  bisacodyl Suppository 10 milliGRAM(s) Rectal daily PRN Constipation  dextrose Oral Gel 15 Gram(s) Oral once PRN Blood Glucose LESS THAN 70 milliGRAM(s)/deciliter  methocarbamol 750 milliGRAM(s) Oral every 8 hours PRN Muscle Spasm  oxycodone    5 mG/acetaminophen 325 mG 1 Tablet(s) Oral every 6 hours PRN Mild Pain (1 - 3)  polyethylene glycol 3350 17 Gram(s) Oral daily PRN Constipation    Allergies  No Known Allergies  Intolerances    Vital Signs Last 24 Hrs  T(C): 36.4 (05 Dec 2022 08:36), Max: 36.7 (04 Dec 2022 13:00)  T(F): 97.5 (05 Dec 2022 08:36), Max: 98 (04 Dec 2022 13:00)  HR: 51 (05 Dec 2022 08:36) (51 - 78)  BP: 136/67 (05 Dec 2022 08:36) (127/69 - 154/70)  BP(mean): --  RR: 18 (05 Dec 2022 08:36) (18 - 18)  SpO2: 95% (05 Dec 2022 08:36) (95% - 98%)    Parameters below as of 05 Dec 2022 08:36  Patient On (Oxygen Delivery Method): room air     BMI (kg/m2): 54 (12-04-22 @ 14:45)      PHYSICAL EXAM  [id number]= 15  GENERAL: NAD,  HEAD:  Atraumatic, Normocephalic  EYES: EOMI, PERRLA, conjunctiva and sclera clear  ENMT: No tonsillar erythema, exudates, or enlargement; Moist mucous membranes, Good dentition, No lesions, neg facial  NECK: Supple,  NERVOUS SYSTEM:  Alert & Oriented X3, Good concentration; Motor Strength 5/5 B/L upper biceps, triceps, and deltoid 5/5 right and left upper, 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 pt states the 5th digit most of the time is outwardly extended and he is unable to adduct that digit often and lower extremities; DTRs 2+ intact and symmetric  CHEST/LUNG: Clear bs bilat; No rales, rhonchi, wheezing, or rubs  HEART: Regular rate and rhythm; No murmurs, rubs, or gallops  ABDOMEN: Soft, Nontender, Nondistended; Bowel sounds present  EXTREMITIES:  2+ Peripheral Pulses, No edema  LYMPH: No lymphadenopathy noted  Skin: pos abrasion of the anterior post patellar region.       LABS:                          12.7   7.36  )-----------( 252      ( 05 Dec 2022 07:20 )             39.4     12-05    136  |  99  |  17.1  ----------------------------<  105<H>  4.6   |  26.0  |  0.76    Ca    9.0      05 Dec 2022 07:20  Mg     1.5     12-05    TPro  6.7  /  Alb  3.9  /  TBili  0.4  /  DBili  x   /  AST  15  /  ALT  15  /  AlkPhos  68  12-05    PT/INR - ( 05 Dec 2022 07:20 )   PT: 12.5 sec;   INR: 1.08 ratio         PTT - ( 05 Dec 2022 07:20 )  PTT:31.9 sec    I&O's Detail    04 Dec 2022 07:01  -  05 Dec 2022 07:00  --------------------------------------------------------  IN:  Total IN: 0 mL    OUT:    Voided (mL): 900 mL  Total OUT: 900 mL    Total NET: -900 mL    RADIOLOGY & ADDITIONAL TESTS:  < from: CT Cervical Spine No Cont (06.02.22 @ 23:56) >           IMPRESSION:  Multilevel degenerative changes of the cervical spine without evidence of   fracture.  --- End of Report ---  < from: MR Cervical Spine No Cont (06.03.22 @ 19:02) >    ACC: [id number] EXAM:  MR SPINE LUMBAR                        ACC: [id number] EXAM:  MR SPINE THORACIC                        ACC: [id number] EXAM:  MR SPINE CERVICAL                        PROCEDURE DATE:  06/03/2022    < from: MR Cervical Spine No Cont (06.03.22 @ 19:02) >  IMPRESSION:  MR cervical spine: Degenerative changes as described. High-grade   multilevel central canal and neural foraminal stenosis without   significant interval change.    MR thoracicspine: There has been posterior decompression at T10 since   the prior MRI from 11/26/2021. There is hyperintense cord signal at   T10-T11 which likely represents myelomalacia. No high-grade central canal   stenosis throughout the thoracic spine.    MR lumbar spine: Postoperative and degenerative changes as described. At   T12-L1 there is moderate to severe central canal stenosis which is   stable. At L1-L2 there is moderate central canal stenosis which appears   mildly worse since the prior study.  --- End of Report ---  < end of copied text >

## 2022-12-05 NOTE — DISCHARGE NOTE PROVIDER - NSDCMRMEDTOKEN_GEN_ALL_CORE_FT
amLODIPine 5 mg oral tablet: 1 tab(s) orally once a day  Artificial Tears ophthalmic solution: 1 drop(s) to each affected eye 3 times a day  atenolol 50 mg oral tablet: 1 tab(s) orally once a day  atorvastatin 80 mg oral tablet: 1 tab(s) orally once a day (at bedtime)  bisacodyl 10 mg rectal suppository: 1 suppository(ies) rectal once a day, As Needed  DULoxetine 20 mg oral delayed release capsule: 1 cap(s) orally once a day MDD:1 cap  ergocalciferol 1.25 mg (50,000 intl units) oral tablet: orally every 7 days  gabapentin 300 mg oral capsule: 1 cap(s) orally 3 times a day MDD:3 tabs  lidocaine 4% topical film: Apply topically to affected area every 24 hours  lisinopril 20 mg oral tablet: 1 tab(s) orally once a day  metFORMIN 1000 mg oral tablet: 1 tab(s) orally 2 times a day  methocarbamol 750 mg oral tablet: 1 tab(s) orally every 8 hours, As needed, Muscle Spasm  Milk of Magnesia: 400 milligram(s) orally once a day, As Needed  oxycodone-acetaminophen 5 mg-325 mg oral tablet: 1 tab(s) orally every 6 hours, As needed, Mild Pain (1 - 3)  pantoprazole 40 mg oral delayed release tablet: 1 tab(s) orally once a day (before a meal)  polyethylene glycol 3350 oral powder for reconstitution: orally once a day, As Needed  Sodium Chloride 1 g oral tablet: orally once a day  Tylenol 325 mg oral tablet: 2 tab(s) orally every 6 hours, As Needed   amLODIPine 5 mg oral tablet: 1 tab(s) orally once a day  atenolol 50 mg oral tablet: 1 tab(s) orally once a day  atorvastatin 80 mg oral tablet: 1 tab(s) orally once a day (at bedtime)  bisacodyl 10 mg rectal suppository: 1 suppository(ies) rectal once a day, As needed, Constipation  dexamethasone 2 mg oral tablet: Dexamethasone taper instructions:    12/16: Take 2 tablets at bedtime (4 mg)  12/17: Take 1 tablets every 12 hours (4 mg)  12/18: Take 1 tablet daily (2 mg)  DULoxetine 20 mg oral delayed release capsule: 1 cap(s) orally once a day  ergocalciferol 1.25 mg (50,000 intl units) oral tablet: orally every 7 days  lisinopril 20 mg oral tablet: 1 tab(s) orally once a day  methocarbamol 750 mg oral tablet: 1 tab(s) orally every 8 hours   Neurontin 300 mg oral capsule: 1 cap(s) orally 3 times a day  ocular lubricant ophthalmic solution: 1 drop(s) to each affected eye 3 times a day  pantoprazole 40 mg oral delayed release tablet: 1 tab(s) orally once a day (before a meal)  polyethylene glycol 3350 oral powder for reconstitution: 17 gram(s) orally once a day  Tylenol 325 mg oral tablet: 2 tab(s) orally every 6 hours, As Needed

## 2022-12-05 NOTE — DISCHARGE NOTE PROVIDER - NSDCFUADDAPPT_GEN_ALL_CORE_FT
pls do not take aspirin  Please follow up with Dr. Sanches by 12/26 to have your staples removed.    Follow up with your PCP as soon as you are able.

## 2022-12-05 NOTE — DISCHARGE NOTE PROVIDER - CARE PROVIDER_API CALL
Se Sanches; PhD)  Neurosurgery  [address] [place] [address]  Phone: [phone]  Fax: [phone]  Follow Up Time: 1 week

## 2022-12-05 NOTE — PROGRESS NOTE ADULT - ASSESSMENT
82 y/o M with hx of HTN, HLD, DM2, CAD s/p CABG, chronic back pain with multiple sx and history of laminectomy and fusion, admitted in June for worsening acute on chronic neck pain with radiation to b/l shoulder bladed and down the upper and mid back, had ct and mri done at that time showed degenerative changes as well as high-grade multilevel central canal and neural foraminal stenosis without significant interval change from prior studies, patient was given muscle relaxant and pain meds, seen  by neurosurgery team at the time recommended no intervention and follow up in the office and was discharged to Rehab, patient followed them in the office now comes in for elective cervical spine surgery, medicine team consulted for the optimization of procedure,  patient is mostly wheel chair bound, denies any chest pain or sob, patient has Hx of TIA in the past last one was 2 years ago, cardiac stents last one 2 years ago and was taking aspirin, previous surgeries without any complications, patient's METS is 1, RCRI is 3, patient EKG shows Sinus jesus with RBBB, last TTE done in 2021 at per Cardilogist chart showed LVH, normal EF, Moderate TR and Moderate MR, MPI in April 2017 was normal study, patient has been cleared by his Cardiologist, patient is high risk for perioperative cardiovascular complications and is optimized from the medicine point of view for planned procedure      Plan:       Cervical spinal stenosis:   Admitted under Neuro surgery  Pain meds   Neuro checks   routein labs  PT eval post op  Bowel meds   optimized from the medicine point of view for planned procedure  IV fluids if NPO   Patient was on oxycodone-acetaminophen 5 mg-325 mg every 6 hours, As needed, lidocaine 4% topical film: Apply topically to affected area every 24 hours and methocarbamol 750 mg oral tablet: 1 tab(s) orally every 8 hours, As needed, Muscle Spasm  Would recommend resume aspirin post op once ok from the Neurosurgery point ov view    Hx of CAD: Will hold aspirin until ok from Neurosurgery.      Peripheral neuropathy  -c/w gabapentin 300mg po tid     HTN:     -c/w Norvasc 5mg po qd   -c/w lisinopril 20mg po qd   -c/w atenolol 50 po qd   with holding parameters   Please hold BP meds post operatively and resume once blood pressure is coming up.     HLD:     -c/w with atorvastatin 80mg daily.     DM2   -fs stable   -c/w accuchecks achs tid   -c/w hypoglycemia protocol   Hold metformin while here in the hospital    Depression   -c/w duloxetine po qd     GERD: protonix 40mg daily     Hx of Hyponatremia: on salt tablets 1gm daily, Monitor BMP     Hypomagnesemia: Being replaced 2 gms     Hx of Constipation: he has been on Miralax, Milk of mag, Bisacodyl daily and Fleet enema as needed at rehab center   DVT ppx  as per primary team.     Medicine team is signing off, please call us post operative if need help.

## 2022-12-05 NOTE — PROGRESS NOTE ADULT - SUBJECTIVE AND OBJECTIVE BOX
GREGORY BONILLA    [id number]    82y      Male    Patient is a 82y old  Male who presents with a chief complaint of Cervical Stenosis (04 Dec 2022 15:50)      INTERVAL HPI/OVERNIGHT EVENTS:    Patient has some neck pain, tolerable, denies worsening, has no weakness, has same numbness, denies fever, chills, chest pain, nausea, vomiting.     REVIEW OF SYSTEMS:    CONSTITUTIONAL: No fever, fatigue  RESPIRATORY: No cough, No shortness of breath  CARDIOVASCULAR: No chest pain, palpitations  GASTROINTESTINAL: No abdominal, No nausea, vomiting  NEUROLOGICAL: No headaches,  loss of strength.  MISCELLANEOUS: some neck pain       Vital Signs Last 24 Hrs  T(C): 36.4 (05 Dec 2022 08:36), Max: 36.7 (04 Dec 2022 13:00)  T(F): 97.5 (05 Dec 2022 08:36), Max: 98 (04 Dec 2022 13:00)  HR: 51 (05 Dec 2022 08:36) (51 - 78)  BP: 136/67 (05 Dec 2022 08:36) (127/69 - 154/70)  BP(mean): --  RR: 18 (05 Dec 2022 08:36) (18 - 18)  SpO2: 95% (05 Dec 2022 08:36) (95% - 98%)    Parameters below as of 05 Dec 2022 08:36  Patient On (Oxygen Delivery Method): room air        PHYSICAL EXAM:    GENERAL: Elderly male looking comfortable   HEENT: PERRL, +EOMI  NECK: soft, Supple, No JVD   CHEST/LUNG: Clear to auscultate bilaterally; No wheezing  HEART: S1S2+, Regular rate and rhythm; No murmurs  ABDOMEN: Soft, Nontender, Nondistended; Bowel sounds present  EXTREMITIES:  1+ Peripheral Pulses, No edema  SKIN: No rashes or lesions  NEURO: AAOX3, no focal deficits, following commands  PSYCH: normal mood      LABS:                        12.7   7.36  )-----------( 252      ( 05 Dec 2022 07:20 )             39.4     12-05    136  |  99  |  17.1  ----------------------------<  105<H>  4.6   |  26.0  |  0.76    Ca    9.0      05 Dec 2022 07:20  Mg     1.5     12-05    TPro  6.7  /  Alb  3.9  /  TBili  0.4  /  DBili  x   /  AST  15  /  ALT  15  /  AlkPhos  68  12-05    PT/INR - ( 05 Dec 2022 07:20 )   PT: 12.5 sec;   INR: 1.08 ratio         PTT - ( 05 Dec 2022 07:20 )  PTT:31.9 sec        I&O's Summary    04 Dec 2022 07:01  -  05 Dec 2022 07:00  --------------------------------------------------------  IN: 0 mL / OUT: 900 mL / NET: -900 mL        MEDICATIONS  (STANDING):  amLODIPine   Tablet 5 milliGRAM(s) Oral daily  artificial  tears Solution 1 Drop(s) Both EYES three times a day  atenolol  Tablet 50 milliGRAM(s) Oral daily  atorvastatin 80 milliGRAM(s) Oral at bedtime  dextrose 50% Injectable 25 Gram(s) IV Push once  DULoxetine 20 milliGRAM(s) Oral daily  gabapentin 300 milliGRAM(s) Oral three times a day  glucagon  Injectable 1 milliGRAM(s) IntraMuscular once  insulin lispro (ADMELOG) corrective regimen sliding scale   SubCutaneous three times a day before meals  lidocaine   4% Patch 1 Patch Transdermal every 24 hours  lisinopril 20 milliGRAM(s) Oral daily  pantoprazole    Tablet 40 milliGRAM(s) Oral before breakfast    MEDICATIONS  (PRN):  bisacodyl Suppository 10 milliGRAM(s) Rectal daily PRN Constipation  dextrose Oral Gel 15 Gram(s) Oral once PRN Blood Glucose LESS THAN 70 milliGRAM(s)/deciliter  methocarbamol 750 milliGRAM(s) Oral every 8 hours PRN Muscle Spasm  oxycodone    5 mG/acetaminophen 325 mG 1 Tablet(s) Oral every 6 hours PRN Mild Pain (1 - 3)  polyethylene glycol 3350 17 Gram(s) Oral daily PRN Constipation

## 2022-12-05 NOTE — PROGRESS NOTE ADULT - ASSESSMENT
This is an 82ym presents for cervical decompression of the c3-6 posterior cervical region with laminectomy and fusion.     Plan  1. hold asa   2. pt was initially scheduled for surg now will be delayed tentatively planned for 12/7  3. continue with blood pressure control on atenolol and Lisinopril.  4. pain control.   This is an 82ym presents for cervical decompression of the c3-6 posterior cervical region with laminectomy and fusion.     Plan  1. hold asa   2. pt was initially scheduled for surg now will be delayed tentatively planned for 12/7  3. continue with blood pressure control on atenolol and Lisinopril.  4. pain control.  5. medical clearance.

## 2022-12-05 NOTE — DISCHARGE NOTE PROVIDER - HOSPITAL COURSE
This is a 82ym admitted on 12/4 from [facility] with hx of cervical stenosis and hx of lumbar fusion. Pt is admitted with unsteady gait which was attributed to cervical stenosis. Pt was initially scheduled for cervical decompression which was delayed. Pt has been taking asa 81mg.   Today: pt was eagerly awaiting to be taken to the operative suite to have his cervical surg completed. Pt states that during his rehab he would ambulate occas with someone holding the walker. Pt states that his neck is painful, he has numbness in the right hand.   Pmhx: HTN, HLD, DM2, CAD   This is a 82ym admitted on 12/4 from [facility] with hx of cervical stenosis and hx of lumbar fusion. Pt is admitted with unsteady gait which was attributed to cervical stenosis. Pt was initially scheduled for cervical decompression on 12/5/22 which was delayed as Pt has been taking asa 81mg and had to be stopped prior to surgery.  12/5/22: pt was eagerly awaiting to be taken to the operative suite to have his cervical surg completed. Pt states that during his rehab he would ambulate occas with someone holding the walker. Pt states that his neck is painful, he has numbness in the right hand.   Pmhx: HTN, HLD, DM2, CAD      Vital Signs Last 24 Hrs  T(C): 36.5 (06 Dec 2022 04:00), Max: 36.5 (05 Dec 2022 17:01)  T(F): 97.7 (06 Dec 2022 04:00), Max: 97.7 (05 Dec 2022 17:01)  HR: 48 (06 Dec 2022 04:00) (48 - 59)  BP: 127/66 (06 Dec 2022 04:00) (103/51 - 136/67)  BP(mean): --  RR: 18 (06 Dec 2022 04:00) (18 - 18)  SpO2: 97% (06 Dec 2022 04:00) (95% - 97%)    Parameters below as of 06 Dec 2022 04:00  Patient On (Oxygen Delivery Method): room air      I&O's Summary    05 Dec 2022 07:01  -  06 Dec 2022 07:00  --------------------------------------------------------  IN: 0 mL / OUT: 600 mL / NET: -600 mL                          12.7   7.36  )-----------( 252      ( 05 Dec 2022 07:20 )             39.4     12-05    136  |  99  |  17.1  ----------------------------<  105<H>  4.6   |  26.0  |  0.76    Ca    9.0      05 Dec 2022 07:20  Mg     1.5     12-05    TPro  6.7  /  Alb  3.9  /  TBili  0.4  /  DBili  x   /  AST  15  /  ALT  15  /  AlkPhos  68  12-05 82ym PMH HTN, HLD, DM, CAD admitted on 12/4 from [facility] with hx of cervical stenosis and hx of lumbar fusion. Pt is admitted with unsteady gait which was attributed to cervical stenosis. Pt was initially scheduled for cervical decompression on 12/5/22 which was delayed as Pt has been taking asa 81mg and had to be stopped prior to surgery.    Pt. underwent C3-7 lami/fusion on 12/12       82ym PMH HTN, HLD, DM, CAD admitted on 12/4 from [facility] with hx of cervical stenosis and hx of lumbar fusion. Pt is admitted with unsteady gait which was attributed to cervical stenosis. Pt was initially scheduled for cervical decompression on 12/5/22 which was delayed as Pt has been taking asa 81mg and had to be stopped prior to surgery.    Pt. underwent C3-5 laminectomy on 12/12. Pt. tolerated procedure well.     Pt. evaluated by PT & deemed appropriate for D/C home with home PT & 24 hr assist.    Pt. seen & examined this AM on neurosurgical rounds. Pt. is stable & appropriate for D/C.

## 2022-12-05 NOTE — DISCHARGE NOTE PROVIDER - NSDCCPCAREPLAN_GEN_ALL_CORE_FT
PRINCIPAL DISCHARGE DIAGNOSIS  Diagnosis: Cervical spinal stenosis  Assessment and Plan of Treatment:        PRINCIPAL DISCHARGE DIAGNOSIS  Diagnosis: Cervical spinal stenosis  Assessment and Plan of Treatment: OR rescheduled for 12/12/22

## 2022-12-05 NOTE — DISCHARGE NOTE PROVIDER - NSDCFUADDINST_GEN_ALL_CORE_FT
pt is limited in his ability to ambulate and therefore, and will return to [facility] If you experience any new or worsening symptoms, please contact your physician & return to the ER.

## 2022-12-06 LAB
APPEARANCE UR: CLEAR — SIGNIFICANT CHANGE UP
BILIRUB UR-MCNC: NEGATIVE — SIGNIFICANT CHANGE UP
COLOR SPEC: YELLOW — SIGNIFICANT CHANGE UP
DIFF PNL FLD: NEGATIVE — SIGNIFICANT CHANGE UP
GLUCOSE BLDC GLUCOMTR-MCNC: 104 MG/DL — HIGH (ref 70–99)
GLUCOSE BLDC GLUCOMTR-MCNC: 114 MG/DL — HIGH (ref 70–99)
GLUCOSE BLDC GLUCOMTR-MCNC: 163 MG/DL — HIGH (ref 70–99)
GLUCOSE BLDC GLUCOMTR-MCNC: 181 MG/DL — HIGH (ref 70–99)
GLUCOSE UR QL: NEGATIVE MG/DL — SIGNIFICANT CHANGE UP
KETONES UR-MCNC: NEGATIVE — SIGNIFICANT CHANGE UP
LEUKOCYTE ESTERASE UR-ACNC: NEGATIVE — SIGNIFICANT CHANGE UP
NITRITE UR-MCNC: NEGATIVE — SIGNIFICANT CHANGE UP
PH UR: 6.5 — SIGNIFICANT CHANGE UP (ref 5–8)
PROT UR-MCNC: NEGATIVE — SIGNIFICANT CHANGE UP
SP GR SPEC: 1.01 — SIGNIFICANT CHANGE UP (ref 1.01–1.02)
UROBILINOGEN FLD QL: NEGATIVE MG/DL — SIGNIFICANT CHANGE UP

## 2022-12-06 PROCEDURE — 99231 SBSQ HOSP IP/OBS SF/LOW 25: CPT

## 2022-12-06 RX ORDER — ENOXAPARIN SODIUM 100 MG/ML
40 INJECTION SUBCUTANEOUS EVERY 24 HOURS
Refills: 0 | Status: COMPLETED | OUTPATIENT
Start: 2022-12-06 | End: 2022-12-10

## 2022-12-06 RX ADMIN — AMLODIPINE BESYLATE 5 MILLIGRAM(S): 2.5 TABLET ORAL at 05:35

## 2022-12-06 RX ADMIN — GABAPENTIN 300 MILLIGRAM(S): 400 CAPSULE ORAL at 22:48

## 2022-12-06 RX ADMIN — GABAPENTIN 300 MILLIGRAM(S): 400 CAPSULE ORAL at 14:00

## 2022-12-06 RX ADMIN — LISINOPRIL 20 MILLIGRAM(S): 2.5 TABLET ORAL at 05:35

## 2022-12-06 RX ADMIN — PANTOPRAZOLE SODIUM 40 MILLIGRAM(S): 20 TABLET, DELAYED RELEASE ORAL at 08:17

## 2022-12-06 RX ADMIN — ATORVASTATIN CALCIUM 80 MILLIGRAM(S): 80 TABLET, FILM COATED ORAL at 22:48

## 2022-12-06 RX ADMIN — ENOXAPARIN SODIUM 40 MILLIGRAM(S): 100 INJECTION SUBCUTANEOUS at 22:49

## 2022-12-06 RX ADMIN — GABAPENTIN 300 MILLIGRAM(S): 400 CAPSULE ORAL at 05:37

## 2022-12-06 RX ADMIN — Medication 1: at 17:47

## 2022-12-06 RX ADMIN — Medication 1 DROP(S): at 14:01

## 2022-12-06 RX ADMIN — Medication 1 DROP(S): at 22:50

## 2022-12-06 RX ADMIN — LIDOCAINE 1 PATCH: 4 CREAM TOPICAL at 22:49

## 2022-12-06 RX ADMIN — ATENOLOL 50 MILLIGRAM(S): 25 TABLET ORAL at 05:35

## 2022-12-06 NOTE — PROGRESS NOTE ADULT - SUBJECTIVE AND OBJECTIVE BOX
NEUROSURGERY PROGRESS NOTE:    HPI: 82ym admitted on 12/4 from [facility] with hx of cervical stenosis and hx of lumbar fusion. Pt is admitted with unsteady gait which was attributed to cervical stenosis. Pt was initially scheduled for cervical decompression which was delayed. Pt has been taking asa 81mg.   Today: pt was eagerly awaiting to be taken to the operative suite to have his cervical surg completed. Pt states that during his rehab he would ambulate occas with someone holding the walker. Pt states that his neck is painful, he has numbness in the right hand.   Pmhx: HTN, HLD, DM2, CAD      12/6/22: pt seen and states is at baseline, denied any new or worsening sensorimotor changes.   states will need help to get OOB and use restroom or a bedpan.   case d/w Dr Sanches and then w SM/SW; pt's sx was rescheduled for 12/12 due to last dose ASA on 12/4 and pt needs to be off ASA x 5 days prior to sx.   plan d/w pt and agreed w Dr Sanches.  RN updated, UA collection pending   Dr. Chetna parikh/ RIAN in chart this morning & signed off         MEDICATIONS  (STANDING):  amLODIPine Tablet 5 milliGRAM(s) Oral daily  artificial  tears Solution 1 Drop(s) Both EYES three times a day  atenolol  Tablet 50 milliGRAM(s) Oral daily  atorvastatin 80 milliGRAM(s) Oral at bedtime  dextrose 50% Injectable 25 Gram(s) IV Push once  DULoxetine 20 milliGRAM(s) Oral daily  gabapentin 300 milliGRAM(s) Oral three times a day  glucagon  Injectable 1 milliGRAM(s) IntraMuscular once  insulin lispro (ADMELOG) corrective regimen sliding scale   SubCutaneous three times a day before meals  lidocaine   4% Patch 1 Patch Transdermal every 24 hours  lisinopril 20 milliGRAM(s) Oral daily  pantoprazole    Tablet 40 milliGRAM(s) Oral before breakfast    MEDICATIONS  (PRN):  bisacodyl Suppository 10 milliGRAM(s) Rectal daily PRN Constipation  dextrose Oral Gel 15 Gram(s) Oral once PRN Blood Glucose LESS THAN 70 milliGRAM(s)/deciliter  methocarbamol 750 milliGRAM(s) Oral every 8 hours PRN Muscle Spasm  oxycodone    5 mG/acetaminophen 325 mG 1 Tablet(s) Oral every 6 hours PRN Mild Pain (1 - 3)  polyethylene glycol 3350 17 Gram(s) Oral daily PRN Constipation    Allergies    No Known Allergies    Intolerances      Vital Signs Last 24 Hrs  T(C): 36.6 (06 Dec 2022 12:24), Max: 36.6 (06 Dec 2022 12:24)  T(F): 97.9 (06 Dec 2022 12:24), Max: 97.9 (06 Dec 2022 12:24)  HR: 47 (06 Dec 2022 12:24) (47 - 59)  BP: 102/61 (06 Dec 2022 12:24) (102/61 - 160/75)  BP(mean): --  RR: 18 (06 Dec 2022 12:24) (18 - 18)  SpO2: 96% (06 Dec 2022 12:24) (95% - 97%)    Parameters below as of 06 Dec 2022 12:24  Patient On (Oxygen Delivery Method): room air          PHYSICAL EXAM:  [id number]= 15  GENERAL: NAD,  HEAD: Atraumatic, Normocephalic  EYES: EOMI, surgical pupils b/l, conjunctiva and sclera clear  ENMT: No tonsillar erythema, exudates, or enlargement; Moist mucous membranes, Good dentition, No lesions, neg facial  NECK: Supple, stiff and uncomfortable when flexing forward   NERVOUS SYSTEM: Alert & Oriented X3, Good concentration; Motor Strength 5/5 B/L upper biceps, triceps, and deltoid 5/5 right and left upper, 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 pt states the 5th digit most of the time is outwardly extended and he is unable to adduct that digit often and lower extremities; 5/5 lowers when isolated mm groups b/l.  CHEST/LUNG: Clear bs bilat; No rales, rhonchi, wheezing, or rubs  HEART: Regular rate and rhythm; No murmurs, rubs, or gallops  ABDOMEN: Soft, Nontender, Nondistended; Bowel sounds present  EXTREMITIES: 2+ Peripheral Pulses, No edema  LYMPH: No lymphadenopathy noted  Skin: pos abrasion of the anterior post patellar region.       LABS:                        12.7   7.36  )-----------( 252      ( 05 Dec 2022 07:20 )             39.4     12-05    136  |  99  |  17.1  ----------------------------<  105<H>  4.6   |  26.0  |  0.76    Ca    9.0      05 Dec 2022 07:20  Mg     1.5     12-05    TPro  6.7  /  Alb  3.9  /  TBili  0.4  /  DBili  x   /  AST  15  /  ALT  15  /  AlkPhos  68  12-05    PT/INR - ( 05 Dec 2022 07:20 )   PT: 12.5 sec;   INR: 1.08 ratio    PTT - ( 05 Dec 2022 07:20 )  PTT:31.9 sec        RADIOLOGY & ADDITIONAL TESTS:  no new NSx images available for review     I spent a total time of 45 mins with the patient at bedside of which more than 50% of time was spent on counseling/coordination of care

## 2022-12-06 NOTE — PROGRESS NOTE ADULT - ASSESSMENT
82ym presents for cervical decompression of the c3-6 posterior cervical region with laminectomy and fusion which is now delayed due to pt having ASA 81mg on 12/4/22  Dr. hCetna parikh/ RIAN in chart this morning & signed off     Plan  1. cont to hold asa   2. cervical laminectomy/fusion on 12/12  3. continue with blood pressure control on atenolol and Lisinopril.  4. pain control PRN  5. VTE ppx w b/l venodynes and Lovenox, to be stopped on 12/11  6. OOB w assist/ fall precaution   7. PT/OT pre-op assessment and treatment   8. ok to shower w assist

## 2022-12-07 LAB
GLUCOSE BLDC GLUCOMTR-MCNC: 109 MG/DL — HIGH (ref 70–99)
GLUCOSE BLDC GLUCOMTR-MCNC: 122 MG/DL — HIGH (ref 70–99)
GLUCOSE BLDC GLUCOMTR-MCNC: 141 MG/DL — HIGH (ref 70–99)

## 2022-12-07 PROCEDURE — 99232 SBSQ HOSP IP/OBS MODERATE 35: CPT

## 2022-12-07 RX ORDER — SENNA PLUS 8.6 MG/1
2 TABLET ORAL AT BEDTIME
Refills: 0 | Status: DISCONTINUED | OUTPATIENT
Start: 2022-12-07 | End: 2022-12-12

## 2022-12-07 RX ADMIN — LISINOPRIL 20 MILLIGRAM(S): 2.5 TABLET ORAL at 06:06

## 2022-12-07 RX ADMIN — GABAPENTIN 300 MILLIGRAM(S): 400 CAPSULE ORAL at 13:31

## 2022-12-07 RX ADMIN — ATORVASTATIN CALCIUM 80 MILLIGRAM(S): 80 TABLET, FILM COATED ORAL at 22:04

## 2022-12-07 RX ADMIN — AMLODIPINE BESYLATE 5 MILLIGRAM(S): 2.5 TABLET ORAL at 06:06

## 2022-12-07 RX ADMIN — Medication 1 DROP(S): at 14:00

## 2022-12-07 RX ADMIN — SENNA PLUS 2 TABLET(S): 8.6 TABLET ORAL at 22:04

## 2022-12-07 RX ADMIN — LIDOCAINE 1 PATCH: 4 CREAM TOPICAL at 09:30

## 2022-12-07 RX ADMIN — GABAPENTIN 300 MILLIGRAM(S): 400 CAPSULE ORAL at 06:06

## 2022-12-07 RX ADMIN — ENOXAPARIN SODIUM 40 MILLIGRAM(S): 100 INJECTION SUBCUTANEOUS at 22:06

## 2022-12-07 RX ADMIN — ATENOLOL 50 MILLIGRAM(S): 25 TABLET ORAL at 06:06

## 2022-12-07 RX ADMIN — Medication 1 DROP(S): at 06:11

## 2022-12-07 RX ADMIN — GABAPENTIN 300 MILLIGRAM(S): 400 CAPSULE ORAL at 22:04

## 2022-12-07 RX ADMIN — PANTOPRAZOLE SODIUM 40 MILLIGRAM(S): 20 TABLET, DELAYED RELEASE ORAL at 06:06

## 2022-12-07 RX ADMIN — DULOXETINE HYDROCHLORIDE 20 MILLIGRAM(S): 30 CAPSULE, DELAYED RELEASE ORAL at 11:00

## 2022-12-07 NOTE — OCCUPATIONAL THERAPY INITIAL EVALUATION ADULT - ADDITIONAL COMMENTS
Pt has cutout tub with curtain and grab bars  Pt owns commode, raised toilet seat, wheelchair, RW, shower chair  Pt is right handed

## 2022-12-07 NOTE — PROGRESS NOTE ADULT - SUBJECTIVE AND OBJECTIVE BOX
INTERVAL HPI/OVERNIGHT EVENTS:  HPI: 82ym admitted on  from [facility] with hx of cervical stenosis and hx of lumbar fusion. Pt is admitted with unsteady gait which was attributed to cervical stenosis. Pt was initially scheduled for cervical decompression which was delayed. Pt has been taking asa 81mg.   Today: pt did well with physical therapy today. Pt states that he has pain. That he always has pain of his neck.   Pmhx: HTN, HLD, DM2, CAD    MEDICATIONS  (STANDING):  amLODIPine   Tablet 5 milliGRAM(s) Oral daily  artificial  tears Solution 1 Drop(s) Both EYES three times a day  atenolol  Tablet 50 milliGRAM(s) Oral daily  atorvastatin 80 milliGRAM(s) Oral at bedtime  dextrose 50% Injectable 25 Gram(s) IV Push once  DULoxetine 20 milliGRAM(s) Oral daily  enoxaparin Injectable 40 milliGRAM(s) SubCutaneous every 24 hours  gabapentin 300 milliGRAM(s) Oral three times a day  glucagon  Injectable 1 milliGRAM(s) IntraMuscular once  insulin lispro (ADMELOG) corrective regimen sliding scale   SubCutaneous three times a day before meals  lidocaine   4% Patch 1 Patch Transdermal every 24 hours  lisinopril 20 milliGRAM(s) Oral daily  pantoprazole    Tablet 40 milliGRAM(s) Oral before breakfast    MEDICATIONS  (PRN):  bisacodyl Suppository 10 milliGRAM(s) Rectal daily PRN Constipation  dextrose Oral Gel 15 Gram(s) Oral once PRN Blood Glucose LESS THAN 70 milliGRAM(s)/deciliter  methocarbamol 750 milliGRAM(s) Oral every 8 hours PRN Muscle Spasm  oxycodone    5 mG/acetaminophen 325 mG 1 Tablet(s) Oral every 6 hours PRN Mild Pain (1 - 3)  polyethylene glycol 3350 17 Gram(s) Oral daily PRN Constipation      Allergies  No Known Allergies  Intolerances    Vital Signs Last 24 Hrs  T(C): 36.6 (07 Dec 2022 08:39), Max: 36.6 (06 Dec 2022 20:00)  T(F): 97.8 (07 Dec 2022 08:39), Max: 97.9 (06 Dec 2022 20:00)  HR: 54 (07 Dec 2022 08:39) (50 - 56)  BP: 144/65 (07 Dec 2022 08:39) (104/53 - 144/65)  BP(mean): --  RR: 18 (07 Dec 2022 08:39) (18 - 18)  SpO2: 94% (07 Dec 2022 08:39) (93% - 98%)    Parameters below as of 07 Dec 2022 08:39  Patient On (Oxygen Delivery Method): nasal cannula     BMI (kg/m2): 54 (12-04-22 @ 14:45)      PHYSICAL EXAM  GENERAL: NAD,   HEAD:  Atraumatic, Normocephalic  EYES: EOMI, PERRLA, conjunctiva and sclera clear  ENMT: No tonsillar erythema, exudates, or enlargement; Moist mucous membranes, Good dentition, No lesions, neg facial  NECK: Supple,   NERVOUS SYSTEM:  Alert & Oriented X3, Good concentration; Motor Strength 5/5 B/L upper and lower extremities; DTRs 2+ intact and symmetric  CHEST/LUNG: Clear bs bilaterally;   HEART: Regular rate and rhythm; No murmurs, rubs, or gallops  ABDOMEN: Soft, Nontender, Nondistended; BS  EXTREMITIES:  neg tenderness with palp of calf bilat, neg edema        LABS:  Urinalysis Basic - ( 06 Dec 2022 14:17 )    Color: Yellow / Appearance: Clear / S.015 / pH: x  Gluc: x / Ketone: Negative  / Bili: Negative / Urobili: Negative mg/dL   Blood: x / Protein: Negative / Nitrite: Negative   Leuk Esterase: Negative / RBC: x / WBC x   Sq Epi: x / Non Sq Epi: x / Bacteria: x      I&O's Detail    06 Dec 2022 07:01  -  07 Dec 2022 07:00  --------------------------------------------------------  IN:  Total IN: 0 mL    OUT:    Voided (mL): 750 mL  Total OUT: 750 mL    Total NET: -750 mL      RADIOLOGY & ADDITIONAL TESTS:  < from: CT Cervical Spine No Cont (22 @ 23:56) >  ACC: [id number] EXAM:  CT CERVICAL SPINE                        PROCEDURE DATE:  2022    IMPRESSION:  Multilevel degenerative changes of the cervical spine without evidence of   fracture.  --- End of Report ---  < end of copied text >    < from: MR Cervical Spine No Cont (22 @ 19:02) >  ACC: [id number] EXAM:  MR SPINE LUMBAR                        ACC: [id number] EXAM:  MR SPINE THORACIC                        ACC: [id number] EXAM:  MR SPINE CERVICAL                        PROCEDURE DATE:  2022    < from: MR Cervical Spine No Cont (22 @ 19:02) >  MR cervical spine: Degenerative changes as described. High-grade   multilevel central canal and neural foraminal stenosis without   significant interval change.    MR thoracicspine: There has been posterior decompression at T10 since   the prior MRI from 2021. There is hyperintense cord signal at   T10-T11 which likely represents myelomalacia. No high-grade central canal   stenosis throughout the thoracic spine.    MR lumbar spine: Postoperative and degenerative changes as described. At   T12-L1 there is moderate to severe central canal stenosis which is   stable. At L1-L2 there is moderate central canal stenosis which appears   mildly worse since the prior study.  --- End of Report ---   end of copied text >

## 2022-12-07 NOTE — PROGRESS NOTE ADULT - ASSESSMENT
This is an 82ym Post op day # 2 cervical floyd for decompression and fusion c3-6    IMP: Cervical stenosis multi level  Plan  -pain control  -dvt prophylaxis -lovenox.   -operative procedure for cervical decompression planned for Monday 12/12  -physical therapy following  This is an 82ym preop cervical floyd for decompression and fusion c3-6    IMP: Cervical stenosis multi level  Plan  -pain control  -dvt prophylaxis -lovenox.   -operative procedure for cervical decompression planned for Monday 12/12  -physical therapy following

## 2022-12-08 LAB
GLUCOSE BLDC GLUCOMTR-MCNC: 115 MG/DL — HIGH (ref 70–99)
GLUCOSE BLDC GLUCOMTR-MCNC: 116 MG/DL — HIGH (ref 70–99)
GLUCOSE BLDC GLUCOMTR-MCNC: 151 MG/DL — HIGH (ref 70–99)
GLUCOSE BLDC GLUCOMTR-MCNC: 217 MG/DL — HIGH (ref 70–99)

## 2022-12-08 RX ADMIN — ENOXAPARIN SODIUM 40 MILLIGRAM(S): 100 INJECTION SUBCUTANEOUS at 21:46

## 2022-12-08 RX ADMIN — SENNA PLUS 2 TABLET(S): 8.6 TABLET ORAL at 21:45

## 2022-12-08 RX ADMIN — AMLODIPINE BESYLATE 5 MILLIGRAM(S): 2.5 TABLET ORAL at 05:45

## 2022-12-08 RX ADMIN — ATORVASTATIN CALCIUM 80 MILLIGRAM(S): 80 TABLET, FILM COATED ORAL at 21:45

## 2022-12-08 RX ADMIN — GABAPENTIN 300 MILLIGRAM(S): 400 CAPSULE ORAL at 14:31

## 2022-12-08 RX ADMIN — PANTOPRAZOLE SODIUM 40 MILLIGRAM(S): 20 TABLET, DELAYED RELEASE ORAL at 05:45

## 2022-12-08 RX ADMIN — LISINOPRIL 20 MILLIGRAM(S): 2.5 TABLET ORAL at 05:45

## 2022-12-08 RX ADMIN — ATENOLOL 50 MILLIGRAM(S): 25 TABLET ORAL at 05:46

## 2022-12-08 RX ADMIN — Medication 1: at 17:31

## 2022-12-08 RX ADMIN — GABAPENTIN 300 MILLIGRAM(S): 400 CAPSULE ORAL at 21:45

## 2022-12-08 RX ADMIN — GABAPENTIN 300 MILLIGRAM(S): 400 CAPSULE ORAL at 05:45

## 2022-12-08 RX ADMIN — DULOXETINE HYDROCHLORIDE 20 MILLIGRAM(S): 30 CAPSULE, DELAYED RELEASE ORAL at 14:31

## 2022-12-08 RX ADMIN — Medication 1 DROP(S): at 14:31

## 2022-12-08 NOTE — PROGRESS NOTE ADULT - SUBJECTIVE AND OBJECTIVE BOX
INTERVAL HPI/OVERNIGHT EVENTS:  82 year old Male w/ PMHX HTN, HLD, DM, CAD s/p CABG admitted on  from [facility] for elective cervical lami/fusion, case delayed for ASA81 use. Dispo issues, patient scheduled for OR on .   Patient seen and examined this morning with neurosurgical team.     Vital Signs Last 24 Hrs  T(C): 36.4 (08 Dec 2022 09:09), Max: 36.7 (07 Dec 2022 16:03)  T(F): 97.6 (08 Dec 2022 09:09), Max: 98 (07 Dec 2022 16:03)  HR: 57 (08 Dec 2022 09:09) (53 - 63)  BP: 151/75 (08 Dec 2022 09:09) (119/67 - 156/67)  BP(mean): --  RR: 18 (08 Dec 2022 09:09) (18 - 18)  SpO2: 95% (08 Dec 2022 09:09) (95% - 98%)  Parameters below as of 08 Dec 2022 09:09  Patient On (Oxygen Delivery Method): room air    PHYSICAL EXAM:  GENERAL: NAD, well-groomed, well-developed  HEAD:  Atraumatic, normocephalic  DRAINS:   WOUND: Dressing clean dry intact  REBECCA COMA SCORE: E- V- M- =       E: 4= opens eyes spontaneously 3= to voice 2= to noxious 1= no opening       V: 5= oriented 4= confused 3= inappropriate words 2= incomprehensible sounds 1= nonverbal 1T= intubated       M: 6= follows commands 5= localizes 4= withdraws 3= flexor posturing 2= extensor posturing 1= no movement  MENTAL STATUS: AAO x3; Awake/Comatose; Opens eyes spontaneously/to voice/to light touch/to noxious stimuli; Appropriately conversant without aphasia/Nonverbal; following simple commands/mimicking/not following commands  CRANIAL NERVES: Visual acuity normal for age, visual fields full to confrontation, PERRL. EOMI without nystagmus. Facial sensation intact V1-3 distribution b/l. Face symmetric w/ normal eye closure and smile, tongue midline. Hearing grossly intact. Speech clear. Head turning and shoulder shrug intact.   REFLEXES: PERRL. Corneals intact b/l. Gag intact. Cough intact. Oculocephalic reflex intact (Doll's eye). Negative Basurto's b/l. Negative clonus b/l  MOTOR: strength 5/5 b/l upper and lower extremities  Uppers     Delt (C5/6)     Bicep (C5/6)     Wrist Extend (C6)     Tricep (C7)     HG (C8/T1)  R                     5/5                 5/5                         5/5                           5/5                   5/5  L                      5/5                 5/5                         5/5                           5/5                   5/5  Lowers      HF(L1/L2)     KE (L3)     DF (L4)     EHL (L5)     PF (S1)      R                     5/5              5/5           5/5           5/5            5/5  L                     5/5               5/5          5/5            5/5            5/5  SENSATION: grossly intact to light touch all extremities  COORDINATION: Gait intact; rapid alternating movements intact; heel to shin intact; no upper extremity dysmetria  CHEST/LUNG: Clear to auscultation bilaterally; no rales, rhonchi, wheezing, or rubs  HEART: +S1/+S2; Regular rate and rhythm; no murmurs, rubs, or gallops  ABDOMEN: Soft, nontender, nondistended; bowel sounds present all four quadrants  EXTREMITIES:  2+ peripheral pulses, no clubbing, cyanosis, or edema  SKIN: Warm, dry; no rashes or lesions    LABS:  Urinalysis Basic - ( 06 Dec 2022 14:17 )    Color: Yellow / Appearance: Clear / S.015 / pH: x  Gluc: x / Ketone: Negative  / Bili: Negative / Urobili: Negative mg/dL   Blood: x / Protein: Negative / Nitrite: Negative   Leuk Esterase: Negative / RBC: x / WBC x   Sq Epi: x / Non Sq Epi: x / Bacteria: x       @ 07:01  -   @ 07:00  --------------------------------------------------------  IN: 0 mL / OUT: 500 mL / NET: -500 mL        RADIOLOGY & ADDITIONAL TESTS:  MR Cervical Spine No Cont (22 @ 19:02)   IMPRESSION:  MR cervical spine: Degenerative changes as described. High-grade   multilevel central canal and neural foraminal stenosis without   significant interval change.  MR thoracicspine: There has been posterior decompression at T10 since   the prior MRI from 2021. There is hyperintense cord signal at   T10-T11 which likely represents myelomalacia. No high-grade central canal   stenosis throughout the thoracic spine.  MR lumbar spine: Postoperative and degenerative changes as described. At   T12-L1 there is moderate to severe central canal stenosis which is   stable. At L1-L2 there is moderate central canal stenosis which appears   mildly worse since the prior study.     INTERVAL HPI/OVERNIGHT EVENTS:  82 year old Male w/ PMHX HTN, HLD, DM, CAD s/p CABG admitted on  from [facility] for elective cervical lami/fusion, case delayed for ASA81 use. Dispo issues, patient scheduled for OR on .   Patient seen and examined this morning with neurosurgical team. Planned for OR Monday, patient pleasantly waiting.     Vital Signs Last 24 Hrs  T(C): 36.4 (08 Dec 2022 09:09), Max: 36.7 (07 Dec 2022 16:03)  T(F): 97.6 (08 Dec 2022 09:09), Max: 98 (07 Dec 2022 16:03)  HR: 57 (08 Dec 2022 09:09) (53 - 63)  BP: 151/75 (08 Dec 2022 09:09) (119/67 - 156/67)  BP(mean): --  RR: 18 (08 Dec 2022 09:09) (18 - 18)  SpO2: 95% (08 Dec 2022 09:09) (95% - 98%)  Parameters below as of 08 Dec 2022 09:09  Patient On (Oxygen Delivery Method): room air    PHYSICAL EXAM:  GENERAL: NAD, well-groomed, well-developed  HEAD: Atraumatic, normocephalic  REBECCA COMA SCORE: E-4 V-5 M-6 = 15  MENTAL STATUS: AAO x3; Awake; Opens eyes spontaneously; Appropriately conversant without aphasia; following simple commands  CRANIAL NERVES: Visual acuity normal for age. EOMI without nystagmus. Facial sensation intact V1-3 distribution b/l. Face symmetric w/ normal eye closure and smile, tongue midline. Hearing grossly intact. Speech clear.   MOTOR: strength 5/5 b/l upper and lower extremities  Uppers     Delt (C5/6)     Bicep (C5/6)     Wrist Extend (C6)     Tricep (C7)     HG (C8/T1)  R                     5/5                 5/5                         5/5                           5/5                   5/5  L                      5/5                 5/5                         5/5                           5/5                   5/5  Lowers      HF(L1/L2)     KE (L3)     DF (L4)     EHL (L5)     PF (S1)      R                     5/5              5/5           5/5           5/5            5/5  L                     5/5               5/5          5/5            5/5            5/5  SENSATION: grossly intact to light touch all extremities  CHEST/LUNG: Nonlabored breaths  HEART: +S1/+S2  SKIN: Warm, dry    LABS:  Urinalysis Basic - ( 06 Dec 2022 14:17 )    Color: Yellow / Appearance: Clear / S.015 / pH: x  Gluc: x / Ketone: Negative  / Bili: Negative / Urobili: Negative mg/dL   Blood: x / Protein: Negative / Nitrite: Negative   Leuk Esterase: Negative / RBC: x / WBC x   Sq Epi: x / Non Sq Epi: x / Bacteria: x    12 @ 07:01  -   @ 07:00  --------------------------------------------------------  IN: 0 mL / OUT: 500 mL / NET: -500 mL      RADIOLOGY & ADDITIONAL TESTS:  MR Cervical Spine No Cont (22 @ 19:02)   IMPRESSION:  MR cervical spine: Degenerative changes as described. High-grade   multilevel central canal and neural foraminal stenosis without   significant interval change.  MR thoracicspine: There has been posterior decompression at T10 since   the prior MRI from 2021. There is hyperintense cord signal at   T10-T11 which likely represents myelomalacia. No high-grade central canal   stenosis throughout the thoracic spine.  MR lumbar spine: Postoperative and degenerative changes as described. At   T12-L1 there is moderate to severe central canal stenosis which is   stable. At L1-L2 there is moderate central canal stenosis which appears   mildly worse since the prior study.

## 2022-12-08 NOTE — PROGRESS NOTE ADULT - ASSESSMENT
82M w/ PMHX HTN, HLD, DM, CAD s/p CABG admitted on 12/4 from [facility] for C3-6 posterior lami/fusion, case delayed for ASA81 use.       Plan  -  82M w/ PMHX HTN, HLD, DM, CAD s/p CABG admitted on 12/4 from [facility] for C3-6 posterior lami/fusion, case delayed for ASA81 use.       Plan  - Q4 neuro checks  - Pain control PRN; avoid oversedation  - Normotensive  - Norvasc 5 daily, Atenolol 50 daily  - Lipitor 80  - Gabapentin 300 TID, Robaxin 8 PRN  - Cymbalta 20  - Zestril 20  - Miralax, Senna  - ISS  -Consistent carb w/ evening snack  - b/l SCDs  - Medical optimization for planned procedure  - D/w Dr. Sanches

## 2022-12-09 LAB
ANION GAP SERPL CALC-SCNC: 11 MMOL/L — SIGNIFICANT CHANGE UP (ref 5–17)
BUN SERPL-MCNC: 26.5 MG/DL — HIGH (ref 8–20)
CALCIUM SERPL-MCNC: 9 MG/DL — SIGNIFICANT CHANGE UP (ref 8.4–10.5)
CHLORIDE SERPL-SCNC: 102 MMOL/L — SIGNIFICANT CHANGE UP (ref 96–108)
CO2 SERPL-SCNC: 25 MMOL/L — SIGNIFICANT CHANGE UP (ref 22–29)
CREAT SERPL-MCNC: 0.7 MG/DL — SIGNIFICANT CHANGE UP (ref 0.5–1.3)
EGFR: 93 ML/MIN/1.73M2 — SIGNIFICANT CHANGE UP
GLUCOSE BLDC GLUCOMTR-MCNC: 114 MG/DL — HIGH (ref 70–99)
GLUCOSE BLDC GLUCOMTR-MCNC: 133 MG/DL — HIGH (ref 70–99)
GLUCOSE BLDC GLUCOMTR-MCNC: 134 MG/DL — HIGH (ref 70–99)
GLUCOSE BLDC GLUCOMTR-MCNC: 166 MG/DL — HIGH (ref 70–99)
GLUCOSE SERPL-MCNC: 128 MG/DL — HIGH (ref 70–99)
HCT VFR BLD CALC: 36.3 % — LOW (ref 39–50)
HGB BLD-MCNC: 11.8 G/DL — LOW (ref 13–17)
MAGNESIUM SERPL-MCNC: 1.6 MG/DL — LOW (ref 1.8–2.6)
MCHC RBC-ENTMCNC: 29.3 PG — SIGNIFICANT CHANGE UP (ref 27–34)
MCHC RBC-ENTMCNC: 32.5 GM/DL — SIGNIFICANT CHANGE UP (ref 32–36)
MCV RBC AUTO: 90.1 FL — SIGNIFICANT CHANGE UP (ref 80–100)
PHOSPHATE SERPL-MCNC: 4.5 MG/DL — SIGNIFICANT CHANGE UP (ref 2.4–4.7)
PLATELET # BLD AUTO: 231 K/UL — SIGNIFICANT CHANGE UP (ref 150–400)
POTASSIUM SERPL-MCNC: 4.3 MMOL/L — SIGNIFICANT CHANGE UP (ref 3.5–5.3)
POTASSIUM SERPL-SCNC: 4.3 MMOL/L — SIGNIFICANT CHANGE UP (ref 3.5–5.3)
RBC # BLD: 4.03 M/UL — LOW (ref 4.2–5.8)
RBC # FLD: 14.9 % — HIGH (ref 10.3–14.5)
SODIUM SERPL-SCNC: 138 MMOL/L — SIGNIFICANT CHANGE UP (ref 135–145)
WBC # BLD: 6.46 K/UL — SIGNIFICANT CHANGE UP (ref 3.8–10.5)
WBC # FLD AUTO: 6.46 K/UL — SIGNIFICANT CHANGE UP (ref 3.8–10.5)

## 2022-12-09 RX ORDER — MAGNESIUM HYDROXIDE 400 MG/1
30 TABLET, CHEWABLE ORAL DAILY
Refills: 0 | Status: DISCONTINUED | OUTPATIENT
Start: 2022-12-09 | End: 2022-12-09

## 2022-12-09 RX ORDER — MAGNESIUM SULFATE 500 MG/ML
2 VIAL (ML) INJECTION ONCE
Refills: 0 | Status: COMPLETED | OUTPATIENT
Start: 2022-12-09 | End: 2022-12-09

## 2022-12-09 RX ADMIN — GABAPENTIN 300 MILLIGRAM(S): 400 CAPSULE ORAL at 13:37

## 2022-12-09 RX ADMIN — DULOXETINE HYDROCHLORIDE 20 MILLIGRAM(S): 30 CAPSULE, DELAYED RELEASE ORAL at 11:27

## 2022-12-09 RX ADMIN — PANTOPRAZOLE SODIUM 40 MILLIGRAM(S): 20 TABLET, DELAYED RELEASE ORAL at 05:41

## 2022-12-09 RX ADMIN — ATORVASTATIN CALCIUM 80 MILLIGRAM(S): 80 TABLET, FILM COATED ORAL at 21:52

## 2022-12-09 RX ADMIN — GABAPENTIN 300 MILLIGRAM(S): 400 CAPSULE ORAL at 21:52

## 2022-12-09 RX ADMIN — GABAPENTIN 300 MILLIGRAM(S): 400 CAPSULE ORAL at 05:40

## 2022-12-09 RX ADMIN — SENNA PLUS 2 TABLET(S): 8.6 TABLET ORAL at 21:52

## 2022-12-09 RX ADMIN — LIDOCAINE 1 PATCH: 4 CREAM TOPICAL at 21:51

## 2022-12-09 RX ADMIN — Medication 1 DROP(S): at 21:51

## 2022-12-09 RX ADMIN — AMLODIPINE BESYLATE 5 MILLIGRAM(S): 2.5 TABLET ORAL at 05:40

## 2022-12-09 RX ADMIN — Medication 1 DROP(S): at 13:38

## 2022-12-09 RX ADMIN — ATENOLOL 50 MILLIGRAM(S): 25 TABLET ORAL at 05:40

## 2022-12-09 RX ADMIN — Medication 25 GRAM(S): at 07:42

## 2022-12-09 RX ADMIN — ENOXAPARIN SODIUM 40 MILLIGRAM(S): 100 INJECTION SUBCUTANEOUS at 21:52

## 2022-12-09 RX ADMIN — LISINOPRIL 20 MILLIGRAM(S): 2.5 TABLET ORAL at 05:40

## 2022-12-09 NOTE — PROGRESS NOTE ADULT - SUBJECTIVE AND OBJECTIVE BOX
INTERVAL HPI/OVERNIGHT EVENTS:  82M w/ PMHX HTN, HLD, DM, CAD s/p CABG admitted on 12/4 from [facility] for C3-6 posterior lami/fusion, case delayed for ASA81 use. Dispo issues, rescheduled 12/12.   Patient seen and examined this morning with neurosurgical team. Planned for OR Monday, patient pleasantly waiting. LBM 12/8.    Vital Signs Last 24 Hrs  T(C): 36.4 (09 Dec 2022 08:26), Max: 37.2 (08 Dec 2022 15:16)  T(F): 97.6 (09 Dec 2022 08:26), Max: 98.9 (08 Dec 2022 15:16)  HR: 46 (09 Dec 2022 08:26) (46 - 61)  BP: 129/52 (09 Dec 2022 08:26) (100/52 - 147/84)  BP(mean): --  RR: 18 (09 Dec 2022 08:26) (18 - 18)  SpO2: 97% (09 Dec 2022 08:26) (93% - 98%)  Parameters below as of 09 Dec 2022 08:26  Patient On (Oxygen Delivery Method): room air    PHYSICAL EXAM:  GENERAL: NAD, well-groomed, well-developed  HEAD: Atraumatic, normocephalic  REBECCA COMA SCORE: E-4 V-5 M-6 = 15  MENTAL STATUS: AAO x3; Awake; Opens eyes spontaneously; Appropriately conversant without aphasia; following simple commands  CRANIAL NERVES: Visual acuity normal for age. EOMI without nystagmus. Facial sensation intact V1-3 distribution b/l. Face symmetric w/ normal eye closure and smile, tongue midline. Hearing grossly intact. Speech clear.   MOTOR: strength 5/5 b/l upper and lower extremities  Uppers     Delt (C5/6)     Bicep (C5/6)     Wrist Extend (C6)     Tricep (C7)     HG (C8/T1)  R                     5/5                 5/5                         5/5                           5/5                   5/5  L                      5/5                 5/5                         5/5                           5/5                   5/5  Lowers      HF(L1/L2)     KE (L3)     DF (L4)     EHL (L5)     PF (S1)      R                     5/5              5/5           5/5           5/5            5/5  L                     5/5               5/5          5/5            5/5            5/5  SENSATION: grossly intact to light touch all extremities  CHEST/LUNG: Nonlabored breaths  HEART: +S1/+S2  SKIN: Warm, dry    LABS:             11.8   6.46  )-----------( 231      ( 09 Dec 2022 05:11 )             36.3     12-09    138  |  102  |  26.5<H>  ----------------------------<  128<H>  4.3   |  25.0  |  0.70    Ca    9.0      09 Dec 2022 05:11  Phos  4.5     12-09  Mg     1.6     12-09 12-08 @ 07:01  -  12-09 @ 07:00  --------------------------------------------------------  IN: 0 mL / OUT: 600 mL / NET: -600 mL      RADIOLOGY & ADDITIONAL TESTS:  MR Cervical Spine No Cont (06.03.22 @ 19:02)   IMPRESSION:  MR cervical spine: Degenerative changes as described. High-grade   multilevel central canal and neural foraminal stenosis without   significant interval change.  MR thoracicspine: There has been posterior decompression at T10 since   the prior MRI from 11/26/2021. There is hyperintense cord signal at   T10-T11 which likely represents myelomalacia. No high-grade central canal   stenosis throughout the thoracic spine.  MR lumbar spine: Postoperative and degenerative changes as described. At   T12-L1 there is moderate to severe central canal stenosis which is   stable. At L1-L2 there is moderate central canal stenosis which appears   mildly worse since the prior study.

## 2022-12-09 NOTE — PROGRESS NOTE ADULT - NS ATTEND AMEND GEN_ALL_CORE FT
I spoke with the pt about the surgery again. I explained the procedure in details. I went through the alternatives. I also went through the goals and no guaranties were given. I went through the risks including infection, Post op hematoma, CSF leak, arm and leg weakness, DVT, PE, MI, Pneumonia, death. The pt signed the consent. I spoke with the pt about the surgery again. I explained the procedure in details. I went through the alternatives. I also went through the goals and no guaranties were given. I went through the risks including infection, Post op hematoma, CSF leak, arm and leg weakness, DVT, PE, MI, Pneumonia, death. The pt signed the consent.   I also spoke to the pts wife over the phone at the day of the surgery and explained the procedure in details and the expectations of the surgery. No guaranties were given. I went also through the risks in details.

## 2022-12-09 NOTE — PROGRESS NOTE ADULT - ASSESSMENT
82M w/ PMHX HTN, HLD, DM, CAD s/p CABG admitted on 12/4 from [facility] for C3-6 posterior lami/fusion, case delayed for ASA81 use.       Plan  - Q4 neuro checks  - Pain control PRN; avoid oversedation  - Normotensive  - Norvasc 5 daily, Atenolol 50 daily  - Lipitor 80  - Gabapentin 300 TID, Robaxin 8 PRN  - Cymbalta 20  - Zestril 20  - Record BM; Miralax, Senna. LBM 12/8  - ISS  - Consistent carb w/ evening snack  - b/l SCDs  - Medical optimization for planned procedure  - D/w Dr. Sanches

## 2022-12-10 LAB
GLUCOSE BLDC GLUCOMTR-MCNC: 117 MG/DL — HIGH (ref 70–99)
GLUCOSE BLDC GLUCOMTR-MCNC: 122 MG/DL — HIGH (ref 70–99)
GLUCOSE BLDC GLUCOMTR-MCNC: 140 MG/DL — HIGH (ref 70–99)

## 2022-12-10 PROCEDURE — 99231 SBSQ HOSP IP/OBS SF/LOW 25: CPT

## 2022-12-10 RX ORDER — CHLORHEXIDINE GLUCONATE 213 G/1000ML
1 SOLUTION TOPICAL DAILY
Refills: 0 | Status: COMPLETED | OUTPATIENT
Start: 2022-12-10 | End: 2022-12-12

## 2022-12-10 RX ADMIN — GABAPENTIN 300 MILLIGRAM(S): 400 CAPSULE ORAL at 06:28

## 2022-12-10 RX ADMIN — GABAPENTIN 300 MILLIGRAM(S): 400 CAPSULE ORAL at 14:24

## 2022-12-10 RX ADMIN — AMLODIPINE BESYLATE 5 MILLIGRAM(S): 2.5 TABLET ORAL at 06:29

## 2022-12-10 RX ADMIN — DULOXETINE HYDROCHLORIDE 20 MILLIGRAM(S): 30 CAPSULE, DELAYED RELEASE ORAL at 11:59

## 2022-12-10 RX ADMIN — ATENOLOL 50 MILLIGRAM(S): 25 TABLET ORAL at 06:28

## 2022-12-10 RX ADMIN — Medication 1 DROP(S): at 14:24

## 2022-12-10 RX ADMIN — ENOXAPARIN SODIUM 40 MILLIGRAM(S): 100 INJECTION SUBCUTANEOUS at 22:21

## 2022-12-10 RX ADMIN — LISINOPRIL 20 MILLIGRAM(S): 2.5 TABLET ORAL at 06:28

## 2022-12-10 RX ADMIN — ATORVASTATIN CALCIUM 80 MILLIGRAM(S): 80 TABLET, FILM COATED ORAL at 22:21

## 2022-12-10 RX ADMIN — GABAPENTIN 300 MILLIGRAM(S): 400 CAPSULE ORAL at 22:21

## 2022-12-10 RX ADMIN — SENNA PLUS 2 TABLET(S): 8.6 TABLET ORAL at 22:21

## 2022-12-10 RX ADMIN — CHLORHEXIDINE GLUCONATE 1 APPLICATION(S): 213 SOLUTION TOPICAL at 22:20

## 2022-12-10 RX ADMIN — LIDOCAINE 1 PATCH: 4 CREAM TOPICAL at 22:22

## 2022-12-10 RX ADMIN — PANTOPRAZOLE SODIUM 40 MILLIGRAM(S): 20 TABLET, DELAYED RELEASE ORAL at 06:29

## 2022-12-10 RX ADMIN — Medication 1 DROP(S): at 06:28

## 2022-12-10 RX ADMIN — Medication 1 DROP(S): at 22:21

## 2022-12-10 NOTE — PROGRESS NOTE ADULT - SUBJECTIVE AND OBJECTIVE BOX
NEUROSURGERY PROGRESS NOTE:    82M w/ PMHX HTN, HLD, DM, CAD s/p CABG admitted on 12/4 from [facility] for C3-6 posterior lami/fusion, case delayed for ASA81 use. Dispo issues, rescheduled 12/12.   Patient seen and examined this morning with neurosurgical team. Planned for OR Monday, patient pleasantly waiting.  pt states is at baseline, denied any new or worsening sensorimotor changes      MEDICATIONS  (STANDING):  amLODIPine   Tablet 5 milliGRAM(s) Oral daily  artificial  tears Solution 1 Drop(s) Both EYES three times a day  atenolol  Tablet 50 milliGRAM(s) Oral daily  atorvastatin 80 milliGRAM(s) Oral at bedtime  chlorhexidine 2% Cloths 1 Application(s) Topical daily  dextrose 50% Injectable 25 Gram(s) IV Push once  DULoxetine 20 milliGRAM(s) Oral daily  enoxaparin Injectable 40 milliGRAM(s) SubCutaneous every 24 hours  gabapentin 300 milliGRAM(s) Oral three times a day  glucagon  Injectable 1 milliGRAM(s) IntraMuscular once  insulin lispro (ADMELOG) corrective regimen sliding scale   SubCutaneous three times a day before meals  lidocaine   4% Patch 1 Patch Transdermal every 24 hours  lisinopril 20 milliGRAM(s) Oral daily  pantoprazole    Tablet 40 milliGRAM(s) Oral before breakfast  senna 2 Tablet(s) Oral at bedtime    MEDICATIONS  (PRN):  bisacodyl Suppository 10 milliGRAM(s) Rectal daily PRN Constipation  dextrose Oral Gel 15 Gram(s) Oral once PRN Blood Glucose LESS THAN 70 milliGRAM(s)/deciliter  methocarbamol 750 milliGRAM(s) Oral every 8 hours PRN Muscle Spasm  oxycodone    5 mG/acetaminophen 325 mG 1 Tablet(s) Oral every 6 hours PRN Mild Pain (1 - 3)  polyethylene glycol 3350 17 Gram(s) Oral daily PRN Constipation    Allergies    No Known Allergies    Intolerances      Vital Signs Last 24 Hrs  T(C): 36.9 (10 Dec 2022 16:08), Max: 37 (10 Dec 2022 01:03)  T(F): 98.5 (10 Dec 2022 16:08), Max: 98.6 (10 Dec 2022 01:03)  HR: 52 (10 Dec 2022 16:08) (52 - 58)  BP: 111/54 (10 Dec 2022 16:08) (111/54 - 159/71)  BP(mean): --  RR: 17 (10 Dec 2022 16:08) (17 - 18)  SpO2: 93% (10 Dec 2022 16:08) (93% - 97%)    Parameters below as of 10 Dec 2022 16:08  Patient On (Oxygen Delivery Method): room air        PHYSICAL EXAM:    GENERAL: NAD, well-groomed, well-developed  HEAD: Atraumatic, normocephalic  REBECCA COMA SCORE: E-4 V-5 M-6 = 15  MENTAL STATUS: AAO x3; Awake; Opens eyes spontaneously; Appropriately conversant without aphasia; following simple commands  CRANIAL NERVES: Visual acuity normal for age. EOMI without nystagmus. Facial sensation intact V1-3 distribution b/l. Face symmetric w/ normal eye closure and smile, tongue midline. Hearing grossly intact. Speech clear.   MOTOR: strength 5/5 b/l upper and lower extremities  Uppers     Delt (C5/6)     Bicep (C5/6)     Wrist Extend (C6)     Tricep (C7)     HG (C8/T1)  R                     5/5                 5/5                 5/5                        5/5                   5/5  L                      5/5                 5/5                5/5                         5/5                   5/5  Lowers      HF(L1/L2)     KE (L3)     DF (L4)     EHL (L5)     PF (S1)     R                     5/5              5/5           5/5           5/5            5/5  L                     5/5               5/5          5/5            5/5            5/5  SENSATION: grossly intact to light touch all extremities  CHEST/LUNG: Nonlabored breaths  HEART: +S1/+S2  SKIN: Warm, dry        LABS:                        11.8   6.46  )-----------( 231      ( 09 Dec 2022 05:11 )             36.3     12-09    138  |  102  |  26.5<H>  ----------------------------<  128<H>  4.3   |  25.0  |  0.70    Ca    9.0      09 Dec 2022 05:11  Phos  4.5     12-09  Mg     1.6     12-09      RADIOLOGY & ADDITIONAL TESTS:  no new NSx images available for review       < from: MR Lumbar Spine No Cont (06.03.22 @ 19:17) >  IMPRESSION:  MR cervical spine: Degenerative changes as described. High-grade multilevel central canal and neural foraminal stenosis without significant interval change.    MR thoracicspine: There has been posterior decompression at T10 since the prior MRI from 11/26/2021. There is hyperintense cord signal at T10-T11 which likely represents myelomalacia. No high-grade central canal stenosis throughout the thoracic spine.    MR lumbar spine: Postoperative and degenerative changes as described. At T12-L1 there is moderate to severe central canal stenosis which is stable. At L1-L2 there is moderate central canal stenosis which appears mildly worse since the prior study.  --- End of Report ---  < end of copied text >    I spent a total time of 15 mins with the patient at bedside of which more than 50% of time was spent on counseling/coordination of care

## 2022-12-10 NOTE — PROGRESS NOTE ADULT - NS ATTEND AMEND GEN_ALL_CORE FT
Neurosurgery Attending Attestation:    Patient seen and examined at bedside. Agree with plan and note as documented above.    agree with plan, Plan for OR Monday    -Carl Talley MD

## 2022-12-10 NOTE — PROGRESS NOTE ADULT - ASSESSMENT
82M w/ PMHX HTN, HLD, DM, CAD s/p CABG admitted on 12/4 from [facility] for C3-6 posterior lami/fusion, case delayed for ASA81 use.   pt at baseline    Plan  - Q4 neuro checks  - Pain control PRN; avoid oversedation  - Normotensive  - Norvasc 5 daily, Atenolol 50 daily  - Lipitor 80  - Gabapentin 300 TID, Robaxin 8 PRN  - Cymbalta 20  - Zestril 20  - Record BM; Miralax, Senna.  - ISS  - Consistent carb w/ evening snack  - b/l SCDs & lovenox ( will d/c 12/11 pre-op)   - Medical optimization for planned procedure  - chlorhexidine wash cloth today and tomorrow x3 pre-op - ordered   - NPO 12/11 p MN  - pt seen w Dr JIn

## 2022-12-11 ENCOUNTER — TRANSCRIPTION ENCOUNTER (OUTPATIENT)
Age: 82
End: 2022-12-11

## 2022-12-11 LAB
ALBUMIN SERPL ELPH-MCNC: 3.5 G/DL — SIGNIFICANT CHANGE UP (ref 3.3–5.2)
ALP SERPL-CCNC: 60 U/L — SIGNIFICANT CHANGE UP (ref 40–120)
ALT FLD-CCNC: 15 U/L — SIGNIFICANT CHANGE UP
ANION GAP SERPL CALC-SCNC: 8 MMOL/L — SIGNIFICANT CHANGE UP (ref 5–17)
APTT BLD: 35.4 SEC — SIGNIFICANT CHANGE UP (ref 27.5–35.5)
AST SERPL-CCNC: 15 U/L — SIGNIFICANT CHANGE UP
BASOPHILS # BLD AUTO: 0.04 K/UL — SIGNIFICANT CHANGE UP (ref 0–0.2)
BASOPHILS NFR BLD AUTO: 0.7 % — SIGNIFICANT CHANGE UP (ref 0–2)
BILIRUB SERPL-MCNC: 0.3 MG/DL — LOW (ref 0.4–2)
BLD GP AB SCN SERPL QL: SIGNIFICANT CHANGE UP
BUN SERPL-MCNC: 23.1 MG/DL — HIGH (ref 8–20)
CALCIUM SERPL-MCNC: 8.7 MG/DL — SIGNIFICANT CHANGE UP (ref 8.4–10.5)
CHLORIDE SERPL-SCNC: 102 MMOL/L — SIGNIFICANT CHANGE UP (ref 96–108)
CO2 SERPL-SCNC: 27 MMOL/L — SIGNIFICANT CHANGE UP (ref 22–29)
CREAT SERPL-MCNC: 0.63 MG/DL — SIGNIFICANT CHANGE UP (ref 0.5–1.3)
EGFR: 96 ML/MIN/1.73M2 — SIGNIFICANT CHANGE UP
EOSINOPHIL # BLD AUTO: 0.17 K/UL — SIGNIFICANT CHANGE UP (ref 0–0.5)
EOSINOPHIL NFR BLD AUTO: 2.9 % — SIGNIFICANT CHANGE UP (ref 0–6)
GLUCOSE BLDC GLUCOMTR-MCNC: 113 MG/DL — HIGH (ref 70–99)
GLUCOSE BLDC GLUCOMTR-MCNC: 127 MG/DL — HIGH (ref 70–99)
GLUCOSE BLDC GLUCOMTR-MCNC: 139 MG/DL — HIGH (ref 70–99)
GLUCOSE BLDC GLUCOMTR-MCNC: 165 MG/DL — HIGH (ref 70–99)
GLUCOSE SERPL-MCNC: 119 MG/DL — HIGH (ref 70–99)
HCT VFR BLD CALC: 36.7 % — LOW (ref 39–50)
HGB BLD-MCNC: 11.7 G/DL — LOW (ref 13–17)
IMM GRANULOCYTES NFR BLD AUTO: 0.3 % — SIGNIFICANT CHANGE UP (ref 0–0.9)
INR BLD: 1.03 RATIO — SIGNIFICANT CHANGE UP (ref 0.88–1.16)
LYMPHOCYTES # BLD AUTO: 1.54 K/UL — SIGNIFICANT CHANGE UP (ref 1–3.3)
LYMPHOCYTES # BLD AUTO: 26.5 % — SIGNIFICANT CHANGE UP (ref 13–44)
MAGNESIUM SERPL-MCNC: 1.6 MG/DL — SIGNIFICANT CHANGE UP (ref 1.6–2.6)
MCHC RBC-ENTMCNC: 29 PG — SIGNIFICANT CHANGE UP (ref 27–34)
MCHC RBC-ENTMCNC: 31.9 GM/DL — LOW (ref 32–36)
MCV RBC AUTO: 90.8 FL — SIGNIFICANT CHANGE UP (ref 80–100)
MONOCYTES # BLD AUTO: 0.67 K/UL — SIGNIFICANT CHANGE UP (ref 0–0.9)
MONOCYTES NFR BLD AUTO: 11.5 % — SIGNIFICANT CHANGE UP (ref 2–14)
NEUTROPHILS # BLD AUTO: 3.38 K/UL — SIGNIFICANT CHANGE UP (ref 1.8–7.4)
NEUTROPHILS NFR BLD AUTO: 58.1 % — SIGNIFICANT CHANGE UP (ref 43–77)
PLATELET # BLD AUTO: 220 K/UL — SIGNIFICANT CHANGE UP (ref 150–400)
POTASSIUM SERPL-MCNC: 4.3 MMOL/L — SIGNIFICANT CHANGE UP (ref 3.5–5.3)
POTASSIUM SERPL-SCNC: 4.3 MMOL/L — SIGNIFICANT CHANGE UP (ref 3.5–5.3)
PREALB SERPL-MCNC: 17 MG/DL — LOW (ref 18–38)
PROT SERPL-MCNC: 6 G/DL — LOW (ref 6.6–8.7)
PROTHROM AB SERPL-ACNC: 11.9 SEC — SIGNIFICANT CHANGE UP (ref 10.5–13.4)
RBC # BLD: 4.04 M/UL — LOW (ref 4.2–5.8)
RBC # FLD: 14.9 % — HIGH (ref 10.3–14.5)
SARS-COV-2 RNA SPEC QL NAA+PROBE: SIGNIFICANT CHANGE UP
SARS-COV-2 RNA SPEC QL NAA+PROBE: SIGNIFICANT CHANGE UP
SODIUM SERPL-SCNC: 137 MMOL/L — SIGNIFICANT CHANGE UP (ref 135–145)
WBC # BLD: 5.82 K/UL — SIGNIFICANT CHANGE UP (ref 3.8–10.5)
WBC # FLD AUTO: 5.82 K/UL — SIGNIFICANT CHANGE UP (ref 3.8–10.5)

## 2022-12-11 RX ORDER — SODIUM CHLORIDE 9 MG/ML
1000 INJECTION INTRAMUSCULAR; INTRAVENOUS; SUBCUTANEOUS
Refills: 0 | Status: DISCONTINUED | OUTPATIENT
Start: 2022-12-12 | End: 2022-12-12

## 2022-12-11 RX ORDER — SODIUM CHLORIDE 9 MG/ML
3 INJECTION INTRAMUSCULAR; INTRAVENOUS; SUBCUTANEOUS EVERY 8 HOURS
Refills: 0 | Status: DISCONTINUED | OUTPATIENT
Start: 2022-12-11 | End: 2022-12-12

## 2022-12-11 RX ORDER — CEFAZOLIN SODIUM 1 G
2000 VIAL (EA) INJECTION ONCE
Refills: 0 | Status: COMPLETED | OUTPATIENT
Start: 2022-12-12 | End: 2022-12-12

## 2022-12-11 RX ADMIN — LIDOCAINE 1 PATCH: 4 CREAM TOPICAL at 12:45

## 2022-12-11 RX ADMIN — Medication 1 DROP(S): at 13:34

## 2022-12-11 RX ADMIN — SENNA PLUS 2 TABLET(S): 8.6 TABLET ORAL at 21:31

## 2022-12-11 RX ADMIN — LIDOCAINE 1 PATCH: 4 CREAM TOPICAL at 08:24

## 2022-12-11 RX ADMIN — CHLORHEXIDINE GLUCONATE 1 APPLICATION(S): 213 SOLUTION TOPICAL at 12:44

## 2022-12-11 RX ADMIN — GABAPENTIN 300 MILLIGRAM(S): 400 CAPSULE ORAL at 21:31

## 2022-12-11 RX ADMIN — GABAPENTIN 300 MILLIGRAM(S): 400 CAPSULE ORAL at 05:27

## 2022-12-11 RX ADMIN — DULOXETINE HYDROCHLORIDE 20 MILLIGRAM(S): 30 CAPSULE, DELAYED RELEASE ORAL at 12:44

## 2022-12-11 RX ADMIN — ATENOLOL 50 MILLIGRAM(S): 25 TABLET ORAL at 05:27

## 2022-12-11 RX ADMIN — ATORVASTATIN CALCIUM 80 MILLIGRAM(S): 80 TABLET, FILM COATED ORAL at 21:31

## 2022-12-11 RX ADMIN — LISINOPRIL 20 MILLIGRAM(S): 2.5 TABLET ORAL at 05:27

## 2022-12-11 RX ADMIN — Medication 1 DROP(S): at 05:30

## 2022-12-11 RX ADMIN — GABAPENTIN 300 MILLIGRAM(S): 400 CAPSULE ORAL at 13:34

## 2022-12-11 RX ADMIN — SODIUM CHLORIDE 3 MILLILITER(S): 9 INJECTION INTRAMUSCULAR; INTRAVENOUS; SUBCUTANEOUS at 21:38

## 2022-12-11 RX ADMIN — Medication 1 DROP(S): at 21:30

## 2022-12-11 RX ADMIN — AMLODIPINE BESYLATE 5 MILLIGRAM(S): 2.5 TABLET ORAL at 05:27

## 2022-12-11 RX ADMIN — PANTOPRAZOLE SODIUM 40 MILLIGRAM(S): 20 TABLET, DELAYED RELEASE ORAL at 05:27

## 2022-12-11 NOTE — PROGRESS NOTE ADULT - SUBJECTIVE AND OBJECTIVE BOX
NEUROSURGERY PROGRESS NOTE:    82M w/ PMHX HTN, HLD, DM, CAD s/p CABG admitted on 12/4 from [facility] for C3-6 posterior lami/fusion, case delayed for ASA81 use. Dispo issues, rescheduled 12/12.   Patient seen and examined this morning with neurosurgical team. Planned for OR Monday, patient pleasantly waiting.  pt states is at baseline, denied any new or worsening sensorimotor changes, + shower and chlorhexidine wipes ordered as of yesterday for OR 12/12 at 7:30 AM. lovenox discontinued this morning  med/cario cleared on 12/5 as documented & preop orders completed       MEDICATIONS  (STANDING):  amLODIPine   Tablet 5 milliGRAM(s) Oral daily  artificial  tears Solution 1 Drop(s) Both EYES three times a day  atenolol  Tablet 50 milliGRAM(s) Oral daily  atorvastatin 80 milliGRAM(s) Oral at bedtime  chlorhexidine 2% Cloths 1 Application(s) Topical daily  dextrose 50% Injectable 25 Gram(s) IV Push once  DULoxetine 20 milliGRAM(s) Oral daily  gabapentin 300 milliGRAM(s) Oral three times a day  glucagon  Injectable 1 milliGRAM(s) IntraMuscular once  insulin lispro (ADMELOG) corrective regimen sliding scale   SubCutaneous three times a day before meals  lidocaine   4% Patch 1 Patch Transdermal every 24 hours  lisinopril 20 milliGRAM(s) Oral daily  pantoprazole    Tablet 40 milliGRAM(s) Oral before breakfast  senna 2 Tablet(s) Oral at bedtime    MEDICATIONS  (PRN):  bisacodyl Suppository 10 milliGRAM(s) Rectal daily PRN Constipation  dextrose Oral Gel 15 Gram(s) Oral once PRN Blood Glucose LESS THAN 70 milliGRAM(s)/deciliter  methocarbamol 750 milliGRAM(s) Oral every 8 hours PRN Muscle Spasm  oxycodone    5 mG/acetaminophen 325 mG 1 Tablet(s) Oral every 6 hours PRN Mild Pain (1 - 3)  polyethylene glycol 3350 17 Gram(s) Oral daily PRN Constipation    Allergies    No Known Allergies    Intolerances      Vital Signs Last 24 Hrs  T(C): 36.4 (11 Dec 2022 12:38), Max: 36.9 (10 Dec 2022 16:08)  T(F): 97.5 (11 Dec 2022 12:38), Max: 98.5 (10 Dec 2022 16:08)  HR: 49 (11 Dec 2022 12:38) (49 - 53)  BP: 145/74 (11 Dec 2022 12:38) (111/54 - 161/78)  BP(mean): --  RR: 18 (11 Dec 2022 12:38) (17 - 18)  SpO2: 97% (11 Dec 2022 12:38) (93% - 97%)    Parameters below as of 11 Dec 2022 12:38  Patient On (Oxygen Delivery Method): room air            PHYSICAL EXAM:  GENERAL: NAD, well-groomed, well-developed  HEAD: Atraumatic, normocephalic  REBECCA COMA SCORE: E-4 V-5 M-6 = 15  MENTAL STATUS: AAO x3; Awake; Opens eyes spontaneously; Appropriately conversant without aphasia; following simple commands  CRANIAL NERVES: Visual acuity normal for age. EOMI without nystagmus. Facial sensation intact V1-3 distribution b/l. Face symmetric w/ normal eye closure and smile, tongue midline. Hearing grossly intact. Speech clear.   MOTOR: strength 5/5 b/l upper and lower extremities  Uppers     Delt (C5/6)     Bicep (C5/6)     Wrist Extend (C6)     Tricep (C7)     HG (C8/T1)  R                     5/5                 5/5                 5/5                        5/5                   5/5  L                      5/5                 5/5                5/5                         5/5                   5/5  Lowers      HF(L1/L2)     KE (L3)     DF (L4)     EHL (L5)     PF (S1)     R                     5/5              5/5           5/5           5/5            5/5  L                     5/5               5/5          5/5            5/5            5/5  SENSATION: grossly intact to light touch all extremities  CHEST/LUNG: Nonlabored breaths  HEART: +S1/+S2  SKIN: Warm, dry      LABS:                        11.7   5.82  )-----------( 220      ( 11 Dec 2022 06:11 )             36.7     12-11    137  |  102  |  23.1<H>  ----------------------------<  119<H>  4.3   |  27.0  |  0.63    Ca    8.7      11 Dec 2022 06:11  Mg     1.6     12-11    TPro  6.0<L>  /  Alb  3.5  /  TBili  0.3<L>  /  DBili  x   /  AST  15  /  ALT  15  /  AlkPhos  60  12-11    PT/INR - ( 11 Dec 2022 06:11 )   PT: 11.9 sec;   INR: 1.03 ratio    PTT - ( 11 Dec 2022 06:11 )  PTT:35.4 sec      12/11/22:  COVID PCS negative   T&S done       RADIOLOGY & ADDITIONAL TESTS:  no new NSx images available for review       < from: MR Cervical Spine No Cont (06.03.22 @ 19:02) >  IMPRESSION:  MR cervical spine: Degenerative changes as described. High-grade multilevel central canal and neural foraminal stenosis without significant interval change.    MR thoracicspine: There has been posterior decompression at T10 since the prior MRI from 11/26/2021. There is hyperintense cord signal at T10-T11 which likely represents myelomalacia. No high-grade central canal stenosis throughout the thoracic spine.    MR lumbar spine: Postoperative and degenerative changes as described. At T12-L1 there is moderate to severe central canal stenosis which is stable. At L1-L2 there is moderate central canal stenosis which appears mildly worse since the prior study.  --- End of Report ---    < end of copied text >    I spent a total time of 45 mins with the patient at bedside of which more than 50% of time was spent on counseling/coordination of care

## 2022-12-11 NOTE — PROGRESS NOTE ADULT - ASSESSMENT
82M w/ PMHX HTN, HLD, DM, CAD s/p CABG admitted on 12/4 from [facility] for C3-6 posterior lami/fusion, case delayed for ASA81 use.   pt at baseline and RIAN completed & optimized from medical standpoint / cleared per med note on 12/5 for C3-6 posterior lami/fusion 12/12 at 7:30 AM    Plan  - pre-op orderers completed   - Q4 neuro checks  - Pain control PRN; avoid oversedation  - Normotensive  - Norvasc 5 daily, Atenolol 50 daily  - Lipitor 80  - Gabapentin 300 TID, Robaxin 8 PRN  - Cymbalta 20  - Zestril 20  - Record BM; Miralax, Senna.  - ISS  - Consistent carb w/ evening snack  - b/l SCDs & lovenox ( will d/c'd 12/11 pre-op)   - chlorhexidine wash cloth 12/10 and today x3 pre-op - ordered   - NPO 12/11 p MN  - pt seen w Dr JIn

## 2022-12-11 NOTE — PROGRESS NOTE ADULT - NUTRITIONAL ASSESSMENT
Diet, Consistent Carbohydrate/No Snacks (12-04-22 @ 14:48) [Active]
Diet, Consistent Carbohydrate w/Evening Snack (12-06-22 @ 13:56) [Active]
Diet, Consistent Carbohydrate w/Evening Snack:   Supplement Feeding Modality:  Oral  Ensure Enlive Cans or Servings Per Day:  1       Frequency:  Three Times a day (12-11-22 @ 08:07) [Active]  Diet, NPO:   NPO for Procedure/Test     NPO Start Date: 12-Dec-2022,   NPO Start Time: 00:01  Except Medications  With Ice Chips/Sips of Water     Special Instructions for Nursing:  Except Medications, With Ice Chips/Sips of Water (12-10-22 @ 19:23) [Ordered]

## 2022-12-12 ENCOUNTER — APPOINTMENT (OUTPATIENT)
Dept: NEUROSURGERY | Facility: HOSPITAL | Age: 82
End: 2022-12-12

## 2022-12-12 LAB
GLUCOSE BLDC GLUCOMTR-MCNC: 111 MG/DL — HIGH (ref 70–99)
GLUCOSE BLDC GLUCOMTR-MCNC: 128 MG/DL — HIGH (ref 70–99)
GLUCOSE BLDC GLUCOMTR-MCNC: 142 MG/DL — HIGH (ref 70–99)
GLUCOSE BLDC GLUCOMTR-MCNC: 146 MG/DL — HIGH (ref 70–99)
GLUCOSE BLDC GLUCOMTR-MCNC: 193 MG/DL — HIGH (ref 70–99)
MRSA PCR RESULT.: SIGNIFICANT CHANGE UP
S AUREUS DNA NOSE QL NAA+PROBE: SIGNIFICANT CHANGE UP

## 2022-12-12 PROCEDURE — 63015 REMOVE SPINE LAMINA >2 CRVCL: CPT

## 2022-12-12 DEVICE — MAYFIELD SKULL PIN ADULT STEEL: Type: IMPLANTABLE DEVICE | Status: FUNCTIONAL

## 2022-12-12 DEVICE — FLOSEAL FAST PREP 5ML: Type: IMPLANTABLE DEVICE | Status: FUNCTIONAL

## 2022-12-12 DEVICE — SURGICEL 2 X 14": Type: IMPLANTABLE DEVICE | Status: FUNCTIONAL

## 2022-12-12 DEVICE — GRAFT DURAL MATRX 3 X 3IN DURGN: Type: IMPLANTABLE DEVICE | Status: FUNCTIONAL

## 2022-12-12 DEVICE — SURGIFOAM PAD 8CM X 12.5CM X 10MM (100): Type: IMPLANTABLE DEVICE | Status: FUNCTIONAL

## 2022-12-12 DEVICE — DURASEAL: Type: IMPLANTABLE DEVICE | Status: FUNCTIONAL

## 2022-12-12 RX ORDER — LISINOPRIL 2.5 MG/1
20 TABLET ORAL DAILY
Refills: 0 | Status: DISCONTINUED | OUTPATIENT
Start: 2022-12-12 | End: 2022-12-16

## 2022-12-12 RX ORDER — AMLODIPINE BESYLATE 2.5 MG/1
5 TABLET ORAL DAILY
Refills: 0 | Status: DISCONTINUED | OUTPATIENT
Start: 2022-12-12 | End: 2022-12-16

## 2022-12-12 RX ORDER — POLYETHYLENE GLYCOL 3350 17 G/17G
17 POWDER, FOR SOLUTION ORAL DAILY
Refills: 0 | Status: DISCONTINUED | OUTPATIENT
Start: 2022-12-12 | End: 2022-12-13

## 2022-12-12 RX ORDER — ATENOLOL 25 MG/1
50 TABLET ORAL DAILY
Refills: 0 | Status: DISCONTINUED | OUTPATIENT
Start: 2022-12-12 | End: 2022-12-16

## 2022-12-12 RX ORDER — HYDROMORPHONE HYDROCHLORIDE 2 MG/ML
0.5 INJECTION INTRAMUSCULAR; INTRAVENOUS; SUBCUTANEOUS
Refills: 0 | Status: DISCONTINUED | OUTPATIENT
Start: 2022-12-12 | End: 2022-12-12

## 2022-12-12 RX ORDER — SODIUM CHLORIDE 9 MG/ML
1000 INJECTION, SOLUTION INTRAVENOUS
Refills: 0 | Status: DISCONTINUED | OUTPATIENT
Start: 2022-12-12 | End: 2022-12-12

## 2022-12-12 RX ORDER — DULOXETINE HYDROCHLORIDE 30 MG/1
20 CAPSULE, DELAYED RELEASE ORAL DAILY
Refills: 0 | Status: DISCONTINUED | OUTPATIENT
Start: 2022-12-12 | End: 2022-12-16

## 2022-12-12 RX ORDER — CEFAZOLIN SODIUM 1 G
2000 VIAL (EA) INJECTION EVERY 8 HOURS
Refills: 0 | Status: COMPLETED | OUTPATIENT
Start: 2022-12-12 | End: 2022-12-13

## 2022-12-12 RX ORDER — FENTANYL CITRATE 50 UG/ML
25 INJECTION INTRAVENOUS
Refills: 0 | Status: DISCONTINUED | OUTPATIENT
Start: 2022-12-12 | End: 2022-12-12

## 2022-12-12 RX ORDER — SODIUM CHLORIDE 9 MG/ML
1000 INJECTION INTRAMUSCULAR; INTRAVENOUS; SUBCUTANEOUS
Refills: 0 | Status: DISCONTINUED | OUTPATIENT
Start: 2022-12-12 | End: 2022-12-13

## 2022-12-12 RX ORDER — LABETALOL HCL 100 MG
10 TABLET ORAL ONCE
Refills: 0 | Status: COMPLETED | OUTPATIENT
Start: 2022-12-12 | End: 2022-12-12

## 2022-12-12 RX ORDER — ATORVASTATIN CALCIUM 80 MG/1
80 TABLET, FILM COATED ORAL AT BEDTIME
Refills: 0 | Status: DISCONTINUED | OUTPATIENT
Start: 2022-12-12 | End: 2022-12-16

## 2022-12-12 RX ORDER — SENNA PLUS 8.6 MG/1
2 TABLET ORAL AT BEDTIME
Refills: 0 | Status: DISCONTINUED | OUTPATIENT
Start: 2022-12-12 | End: 2022-12-16

## 2022-12-12 RX ORDER — METHOCARBAMOL 500 MG/1
500 TABLET, FILM COATED ORAL EVERY 6 HOURS
Refills: 0 | Status: DISCONTINUED | OUTPATIENT
Start: 2022-12-12 | End: 2022-12-13

## 2022-12-12 RX ORDER — HYDROMORPHONE HYDROCHLORIDE 2 MG/ML
0.5 INJECTION INTRAMUSCULAR; INTRAVENOUS; SUBCUTANEOUS EVERY 6 HOURS
Refills: 0 | Status: DISCONTINUED | OUTPATIENT
Start: 2022-12-12 | End: 2022-12-13

## 2022-12-12 RX ORDER — GABAPENTIN 400 MG/1
300 CAPSULE ORAL THREE TIMES A DAY
Refills: 0 | Status: DISCONTINUED | OUTPATIENT
Start: 2022-12-12 | End: 2022-12-16

## 2022-12-12 RX ORDER — ONDANSETRON 8 MG/1
4 TABLET, FILM COATED ORAL ONCE
Refills: 0 | Status: DISCONTINUED | OUTPATIENT
Start: 2022-12-12 | End: 2022-12-12

## 2022-12-12 RX ORDER — OXYCODONE HYDROCHLORIDE 5 MG/1
5 TABLET ORAL EVERY 4 HOURS
Refills: 0 | Status: DISCONTINUED | OUTPATIENT
Start: 2022-12-12 | End: 2022-12-16

## 2022-12-12 RX ORDER — HYDRALAZINE HCL 50 MG
5 TABLET ORAL ONCE
Refills: 0 | Status: COMPLETED | OUTPATIENT
Start: 2022-12-12 | End: 2022-12-12

## 2022-12-12 RX ORDER — PANTOPRAZOLE SODIUM 20 MG/1
40 TABLET, DELAYED RELEASE ORAL
Refills: 0 | Status: DISCONTINUED | OUTPATIENT
Start: 2022-12-12 | End: 2022-12-16

## 2022-12-12 RX ORDER — ACETAMINOPHEN 500 MG
650 TABLET ORAL EVERY 6 HOURS
Refills: 0 | Status: DISCONTINUED | OUTPATIENT
Start: 2022-12-12 | End: 2022-12-16

## 2022-12-12 RX ADMIN — HYDROMORPHONE HYDROCHLORIDE 0.5 MILLIGRAM(S): 2 INJECTION INTRAMUSCULAR; INTRAVENOUS; SUBCUTANEOUS at 19:15

## 2022-12-12 RX ADMIN — Medication 10 MILLIGRAM(S): at 16:40

## 2022-12-12 RX ADMIN — ATENOLOL 50 MILLIGRAM(S): 25 TABLET ORAL at 05:27

## 2022-12-12 RX ADMIN — DULOXETINE HYDROCHLORIDE 20 MILLIGRAM(S): 30 CAPSULE, DELAYED RELEASE ORAL at 11:25

## 2022-12-12 RX ADMIN — CHLORHEXIDINE GLUCONATE 1 APPLICATION(S): 213 SOLUTION TOPICAL at 11:24

## 2022-12-12 RX ADMIN — GABAPENTIN 300 MILLIGRAM(S): 400 CAPSULE ORAL at 22:55

## 2022-12-12 RX ADMIN — LISINOPRIL 20 MILLIGRAM(S): 2.5 TABLET ORAL at 05:27

## 2022-12-12 RX ADMIN — HYDROMORPHONE HYDROCHLORIDE 0.5 MILLIGRAM(S): 2 INJECTION INTRAMUSCULAR; INTRAVENOUS; SUBCUTANEOUS at 19:25

## 2022-12-12 RX ADMIN — HYDROMORPHONE HYDROCHLORIDE 0.5 MILLIGRAM(S): 2 INJECTION INTRAMUSCULAR; INTRAVENOUS; SUBCUTANEOUS at 18:45

## 2022-12-12 RX ADMIN — SODIUM CHLORIDE 3 MILLILITER(S): 9 INJECTION INTRAMUSCULAR; INTRAVENOUS; SUBCUTANEOUS at 05:34

## 2022-12-12 RX ADMIN — ATORVASTATIN CALCIUM 80 MILLIGRAM(S): 80 TABLET, FILM COATED ORAL at 22:55

## 2022-12-12 RX ADMIN — Medication 5 MILLIGRAM(S): at 15:55

## 2022-12-12 RX ADMIN — AMLODIPINE BESYLATE 5 MILLIGRAM(S): 2.5 TABLET ORAL at 05:27

## 2022-12-12 RX ADMIN — Medication 1 DROP(S): at 05:27

## 2022-12-12 RX ADMIN — SODIUM CHLORIDE 50 MILLILITER(S): 9 INJECTION INTRAMUSCULAR; INTRAVENOUS; SUBCUTANEOUS at 22:05

## 2022-12-12 RX ADMIN — METHOCARBAMOL 500 MILLIGRAM(S): 500 TABLET, FILM COATED ORAL at 19:48

## 2022-12-12 RX ADMIN — Medication 1 DROP(S): at 22:56

## 2022-12-12 RX ADMIN — SENNA PLUS 2 TABLET(S): 8.6 TABLET ORAL at 22:55

## 2022-12-12 RX ADMIN — Medication 100 MILLIGRAM(S): at 22:56

## 2022-12-12 RX ADMIN — HYDROMORPHONE HYDROCHLORIDE 0.5 MILLIGRAM(S): 2 INJECTION INTRAMUSCULAR; INTRAVENOUS; SUBCUTANEOUS at 18:55

## 2022-12-12 RX ADMIN — SODIUM CHLORIDE 85 MILLILITER(S): 9 INJECTION INTRAMUSCULAR; INTRAVENOUS; SUBCUTANEOUS at 06:01

## 2022-12-12 RX ADMIN — Medication 100 MILLIGRAM(S): at 14:10

## 2022-12-12 NOTE — DIETITIAN INITIAL EVALUATION ADULT - PERTINENT LABORATORY DATA
12-11    137  |  102  |  23.1<H>  ----------------------------<  119<H>  4.3   |  27.0  |  0.63    Ca    8.7      11 Dec 2022 06:11  Mg     1.6     12-11    TPro  6.0<L>  /  Alb  3.5  /  TBili  0.3<L>  /  DBili  x   /  AST  15  /  ALT  15  /  AlkPhos  60  12-11  POCT Blood Glucose.: 146 mg/dL (12-12-22 @ 05:26)  A1C with Estimated Average Glucose Result: 6.5 % (12-05-22 @ 07:20)

## 2022-12-12 NOTE — DIETITIAN INITIAL EVALUATION ADULT - NS FNS DIET ORDER
Diet, Consistent Carbohydrate w/Evening Snack:   Supplement Feeding Modality:  Oral  Ensure Enlive Cans or Servings Per Day:  1       Frequency:  Three Times a day (12-11-22 @ 08:07)  Diet, NPO:   NPO for Procedure/Test     NPO Start Date: 12-Dec-2022,   NPO Start Time: 00:01  Except Medications  With Ice Chips/Sips of Water     Special Instructions for Nursing:  Except Medications, With Ice Chips/Sips of Water (12-10-22 @ 19:23)

## 2022-12-12 NOTE — DIETITIAN INITIAL EVALUATION ADULT - ORAL INTAKE PTA/DIET HISTORY
Pt reported good appetite PTA and currently. Denied recent wt changes, stated  lbs. Provided DM nutrition education, pt denied further nutrition related questions/concerns. Pt currently NPO for planned procedure.

## 2022-12-12 NOTE — DIETITIAN INITIAL EVALUATION ADULT - PERTINENT MEDS FT
MEDICATIONS  (STANDING):  amLODIPine   Tablet 5 milliGRAM(s) Oral daily  artificial  tears Solution 1 Drop(s) Both EYES three times a day  atenolol  Tablet 50 milliGRAM(s) Oral daily  atorvastatin 80 milliGRAM(s) Oral at bedtime  chlorhexidine 2% Cloths 1 Application(s) Topical daily  dextrose 50% Injectable 25 Gram(s) IV Push once  DULoxetine 20 milliGRAM(s) Oral daily  gabapentin 300 milliGRAM(s) Oral three times a day  glucagon  Injectable 1 milliGRAM(s) IntraMuscular once  insulin lispro (ADMELOG) corrective regimen sliding scale   SubCutaneous three times a day before meals  lidocaine   4% Patch 1 Patch Transdermal every 24 hours  lisinopril 20 milliGRAM(s) Oral daily  pantoprazole    Tablet 40 milliGRAM(s) Oral before breakfast  senna 2 Tablet(s) Oral at bedtime  sodium chloride 0.9% lock flush 3 milliLiter(s) IV Push every 8 hours  sodium chloride 0.9%. 1000 milliLiter(s) (85 mL/Hr) IV Continuous <Continuous>    MEDICATIONS  (PRN):  bisacodyl Suppository 10 milliGRAM(s) Rectal daily PRN Constipation  dextrose Oral Gel 15 Gram(s) Oral once PRN Blood Glucose LESS THAN 70 milliGRAM(s)/deciliter  methocarbamol 750 milliGRAM(s) Oral every 8 hours PRN Muscle Spasm  polyethylene glycol 3350 17 Gram(s) Oral daily PRN Constipation

## 2022-12-12 NOTE — DIETITIAN INITIAL EVALUATION ADULT - OTHER INFO
82M w/ PMHX HTN, HLD, DM, CAD s/p CABG admitted on 12/4 from [facility] for C3-6 posterior lami/fusion, case delayed for ASA81 use.

## 2022-12-13 LAB
ANION GAP SERPL CALC-SCNC: 15 MMOL/L — SIGNIFICANT CHANGE UP (ref 5–17)
BUN SERPL-MCNC: 16.9 MG/DL — SIGNIFICANT CHANGE UP (ref 8–20)
CALCIUM SERPL-MCNC: 8.9 MG/DL — SIGNIFICANT CHANGE UP (ref 8.4–10.5)
CHLORIDE SERPL-SCNC: 93 MMOL/L — LOW (ref 96–108)
CO2 SERPL-SCNC: 23 MMOL/L — SIGNIFICANT CHANGE UP (ref 22–29)
CREAT SERPL-MCNC: 0.91 MG/DL — SIGNIFICANT CHANGE UP (ref 0.5–1.3)
EGFR: 85 ML/MIN/1.73M2 — SIGNIFICANT CHANGE UP
GLUCOSE BLDC GLUCOMTR-MCNC: 163 MG/DL — HIGH (ref 70–99)
GLUCOSE BLDC GLUCOMTR-MCNC: 174 MG/DL — HIGH (ref 70–99)
GLUCOSE BLDC GLUCOMTR-MCNC: 177 MG/DL — HIGH (ref 70–99)
GLUCOSE BLDC GLUCOMTR-MCNC: 266 MG/DL — HIGH (ref 70–99)
GLUCOSE SERPL-MCNC: 218 MG/DL — HIGH (ref 70–99)
HCT VFR BLD CALC: 38.7 % — LOW (ref 39–50)
HGB BLD-MCNC: 12.6 G/DL — LOW (ref 13–17)
MCHC RBC-ENTMCNC: 28.8 PG — SIGNIFICANT CHANGE UP (ref 27–34)
MCHC RBC-ENTMCNC: 32.6 GM/DL — SIGNIFICANT CHANGE UP (ref 32–36)
MCV RBC AUTO: 88.4 FL — SIGNIFICANT CHANGE UP (ref 80–100)
PLATELET # BLD AUTO: 262 K/UL — SIGNIFICANT CHANGE UP (ref 150–400)
POTASSIUM SERPL-MCNC: 4.2 MMOL/L — SIGNIFICANT CHANGE UP (ref 3.5–5.3)
POTASSIUM SERPL-SCNC: 4.2 MMOL/L — SIGNIFICANT CHANGE UP (ref 3.5–5.3)
RBC # BLD: 4.38 M/UL — SIGNIFICANT CHANGE UP (ref 4.2–5.8)
RBC # FLD: 14.7 % — HIGH (ref 10.3–14.5)
SODIUM SERPL-SCNC: 131 MMOL/L — LOW (ref 135–145)
WBC # BLD: 11.64 K/UL — HIGH (ref 3.8–10.5)
WBC # FLD AUTO: 11.64 K/UL — HIGH (ref 3.8–10.5)

## 2022-12-13 RX ORDER — METHOCARBAMOL 500 MG/1
1000 TABLET, FILM COATED ORAL ONCE
Refills: 0 | Status: DISCONTINUED | OUTPATIENT
Start: 2022-12-13 | End: 2022-12-13

## 2022-12-13 RX ORDER — INSULIN LISPRO 100/ML
VIAL (ML) SUBCUTANEOUS
Refills: 0 | Status: DISCONTINUED | OUTPATIENT
Start: 2022-12-13 | End: 2022-12-16

## 2022-12-13 RX ORDER — DEXTROSE 50 % IN WATER 50 %
25 SYRINGE (ML) INTRAVENOUS ONCE
Refills: 0 | Status: DISCONTINUED | OUTPATIENT
Start: 2022-12-13 | End: 2022-12-16

## 2022-12-13 RX ORDER — DEXTROSE 50 % IN WATER 50 %
15 SYRINGE (ML) INTRAVENOUS ONCE
Refills: 0 | Status: DISCONTINUED | OUTPATIENT
Start: 2022-12-13 | End: 2022-12-16

## 2022-12-13 RX ORDER — SODIUM CHLORIDE 9 MG/ML
1000 INJECTION, SOLUTION INTRAVENOUS
Refills: 0 | Status: DISCONTINUED | OUTPATIENT
Start: 2022-12-13 | End: 2022-12-16

## 2022-12-13 RX ORDER — METHOCARBAMOL 500 MG/1
750 TABLET, FILM COATED ORAL EVERY 6 HOURS
Refills: 0 | Status: DISCONTINUED | OUTPATIENT
Start: 2022-12-13 | End: 2022-12-16

## 2022-12-13 RX ORDER — DEXAMETHASONE 0.5 MG/5ML
4 ELIXIR ORAL EVERY 8 HOURS
Refills: 0 | Status: DISCONTINUED | OUTPATIENT
Start: 2022-12-13 | End: 2022-12-15

## 2022-12-13 RX ORDER — DEXTROSE 50 % IN WATER 50 %
12.5 SYRINGE (ML) INTRAVENOUS ONCE
Refills: 0 | Status: DISCONTINUED | OUTPATIENT
Start: 2022-12-13 | End: 2022-12-16

## 2022-12-13 RX ORDER — POLYETHYLENE GLYCOL 3350 17 G/17G
17 POWDER, FOR SOLUTION ORAL DAILY
Refills: 0 | Status: DISCONTINUED | OUTPATIENT
Start: 2022-12-13 | End: 2022-12-16

## 2022-12-13 RX ORDER — ACETAMINOPHEN 500 MG
1000 TABLET ORAL ONCE
Refills: 0 | Status: COMPLETED | OUTPATIENT
Start: 2022-12-13 | End: 2022-12-13

## 2022-12-13 RX ORDER — GLUCAGON INJECTION, SOLUTION 0.5 MG/.1ML
1 INJECTION, SOLUTION SUBCUTANEOUS ONCE
Refills: 0 | Status: DISCONTINUED | OUTPATIENT
Start: 2022-12-13 | End: 2022-12-16

## 2022-12-13 RX ORDER — HYDROMORPHONE HYDROCHLORIDE 2 MG/ML
0.5 INJECTION INTRAMUSCULAR; INTRAVENOUS; SUBCUTANEOUS EVERY 4 HOURS
Refills: 0 | Status: DISCONTINUED | OUTPATIENT
Start: 2022-12-13 | End: 2022-12-15

## 2022-12-13 RX ADMIN — Medication 1000 MILLIGRAM(S): at 17:02

## 2022-12-13 RX ADMIN — SENNA PLUS 2 TABLET(S): 8.6 TABLET ORAL at 21:16

## 2022-12-13 RX ADMIN — Medication 1 DROP(S): at 05:35

## 2022-12-13 RX ADMIN — HYDROMORPHONE HYDROCHLORIDE 0.5 MILLIGRAM(S): 2 INJECTION INTRAMUSCULAR; INTRAVENOUS; SUBCUTANEOUS at 22:16

## 2022-12-13 RX ADMIN — GABAPENTIN 300 MILLIGRAM(S): 400 CAPSULE ORAL at 21:16

## 2022-12-13 RX ADMIN — GABAPENTIN 300 MILLIGRAM(S): 400 CAPSULE ORAL at 05:33

## 2022-12-13 RX ADMIN — ATORVASTATIN CALCIUM 80 MILLIGRAM(S): 80 TABLET, FILM COATED ORAL at 21:16

## 2022-12-13 RX ADMIN — Medication 1000 MILLIGRAM(S): at 22:00

## 2022-12-13 RX ADMIN — Medication 4 MILLIGRAM(S): at 21:16

## 2022-12-13 RX ADMIN — Medication 1 DROP(S): at 13:17

## 2022-12-13 RX ADMIN — Medication 2: at 12:36

## 2022-12-13 RX ADMIN — HYDROMORPHONE HYDROCHLORIDE 0.5 MILLIGRAM(S): 2 INJECTION INTRAMUSCULAR; INTRAVENOUS; SUBCUTANEOUS at 06:00

## 2022-12-13 RX ADMIN — HYDROMORPHONE HYDROCHLORIDE 0.5 MILLIGRAM(S): 2 INJECTION INTRAMUSCULAR; INTRAVENOUS; SUBCUTANEOUS at 18:30

## 2022-12-13 RX ADMIN — METHOCARBAMOL 500 MILLIGRAM(S): 500 TABLET, FILM COATED ORAL at 05:33

## 2022-12-13 RX ADMIN — PANTOPRAZOLE SODIUM 40 MILLIGRAM(S): 20 TABLET, DELAYED RELEASE ORAL at 05:35

## 2022-12-13 RX ADMIN — Medication 100 MILLIGRAM(S): at 05:34

## 2022-12-13 RX ADMIN — LISINOPRIL 20 MILLIGRAM(S): 2.5 TABLET ORAL at 05:34

## 2022-12-13 RX ADMIN — ATENOLOL 50 MILLIGRAM(S): 25 TABLET ORAL at 05:34

## 2022-12-13 RX ADMIN — GABAPENTIN 300 MILLIGRAM(S): 400 CAPSULE ORAL at 13:16

## 2022-12-13 RX ADMIN — METHOCARBAMOL 500 MILLIGRAM(S): 500 TABLET, FILM COATED ORAL at 00:26

## 2022-12-13 RX ADMIN — Medication 1 DROP(S): at 21:17

## 2022-12-13 RX ADMIN — DULOXETINE HYDROCHLORIDE 20 MILLIGRAM(S): 30 CAPSULE, DELAYED RELEASE ORAL at 17:55

## 2022-12-13 RX ADMIN — Medication 400 MILLIGRAM(S): at 21:15

## 2022-12-13 RX ADMIN — Medication 6: at 17:55

## 2022-12-13 RX ADMIN — HYDROMORPHONE HYDROCHLORIDE 0.5 MILLIGRAM(S): 2 INJECTION INTRAMUSCULAR; INTRAVENOUS; SUBCUTANEOUS at 04:39

## 2022-12-13 RX ADMIN — Medication 400 MILLIGRAM(S): at 13:17

## 2022-12-13 RX ADMIN — HYDROMORPHONE HYDROCHLORIDE 0.5 MILLIGRAM(S): 2 INJECTION INTRAMUSCULAR; INTRAVENOUS; SUBCUTANEOUS at 17:55

## 2022-12-13 RX ADMIN — HYDROMORPHONE HYDROCHLORIDE 0.5 MILLIGRAM(S): 2 INJECTION INTRAMUSCULAR; INTRAVENOUS; SUBCUTANEOUS at 22:45

## 2022-12-13 RX ADMIN — METHOCARBAMOL 500 MILLIGRAM(S): 500 TABLET, FILM COATED ORAL at 13:16

## 2022-12-13 RX ADMIN — AMLODIPINE BESYLATE 5 MILLIGRAM(S): 2.5 TABLET ORAL at 05:33

## 2022-12-13 NOTE — PHYSICAL THERAPY INITIAL EVALUATION ADULT - CRITERIA FOR SKILLED THERAPEUTIC INTERVENTIONS
impairments found/functional limitations in following categories/risk reduction/prevention/rehab potential/therapy frequency/predicted duration of therapy intervention/anticipated equipment needs at discharge/anticipated discharge recommendation
impairments found/functional limitations in following categories/rehab potential/therapy frequency/predicted duration of therapy intervention/anticipated discharge recommendation

## 2022-12-13 NOTE — PHYSICAL THERAPY INITIAL EVALUATION ADULT - MANUAL MUSCLE TESTING RESULTS, REHAB EVAL
see OT for UE findings, mayra hips and knees 3+/5/no strength deficits were identified
Bilateral LE 3/5 t/o

## 2022-12-13 NOTE — PHYSICAL THERAPY INITIAL EVALUATION ADULT - RANGE OF MOTION EXAMINATION, REHAB EVAL
see OT for UE findings/no ROM deficits were identified
bilateral lower extremity ROM was WFL (within functional limits)

## 2022-12-13 NOTE — PHYSICAL THERAPY INITIAL EVALUATION ADULT - DIAGNOSIS, PT EVAL
decreased strength
Pt with decreased functional mobility secondary to LE weakness, decrease balance and decreased endurance.

## 2022-12-13 NOTE — PROGRESS NOTE ADULT - SUBJECTIVE AND OBJECTIVE BOX
SUBJECTIVE: 81 y/o male scheduled for planned surgery on 12/5/22. Pt states he has an unsteady gait secondary to cervical stenosis, multiple falls, currently residing at [facility].  (04 Dec 2022 14:49). Pt had C3-C5 laminectomy on 12/12 with Dr. Sanches.     INTERVAL HX/OVERNIGHT EVENTS  POD#1 s/p C3-C5 laminectomy. Patient seen and examined at bedside. Pt reports significant neck and shoulder pain. Reports pain was improved yesterday with Dilaudid. Reports his legs felt weak today when working with PT. Denies chest pain, shortness of breath, nausea/vomiting. Last BM: 12/12. LOVELY drain in place-output 40cc.     Vital Signs Last 24 Hrs  T(C): 36.6 (12-13-22 @ 07:30), Max: 36.8 (12-12-22 @ 17:45)  T(F): 97.8 (12-13-22 @ 07:30), Max: 98.2 (12-12-22 @ 17:45)  HR: 62 (12-13-22 @ 10:01) (62 - 94)  BP: 127/57 (12-13-22 @ 10:01) (127/57 - 193/79)  BP(mean): 75 (12-13-22 @ 10:01) (75 - 113)  RR: 16 (12-13-22 @ 10:01) (12 - 21)  SpO2: 95% (12-13-22 @ 10:01) (92% - 99%)    PHYSICAL EXAM:    GENERAL: NAD      INCISION: Dressing in place, c/d/i       DRAINS:  LOVELY drain in place with bulb on full suction, serosanguinous drainage present    HEAD: normocephalic     MENTAL STATUS: AAO x 3, conversant, following simple commands      CRANIAL NERVES: PERRL, EOMI without nystagmus. Face symmetric, Tongue is midline. Hearing grossly intact. Head turning and shoulder shrug intact.      REFLEXES: No pronator drift     MOTOR: strength 5/5 b/l upper and lower extremities     SENSATION: grossly intact to light touch all extremities     SKIN: Warm, dry        LABS:                          12.6   11.64 )-----------( 262      ( 13 Dec 2022 05:01 )             38.7    12-13    131<L>  |  93<L>  |  16.9  ----------------------------<  218<H>  4.2   |  23.0  |  0.91    Ca    8.9      13 Dec 2022 05:01        12-12 @ 07:01  -  12-13 @ 07:00  --------------------------------------------------------  IN: 500 mL / OUT: 1090 mL / NET: -590 mL        IMAGING:     MR Lumbar Spine No Cont (06.03.22 @ 19:17)   IMPRESSION:  MR cervical spine: Degenerative changes as described. High-grade   multilevel central canal and neural foraminal stenosis without   significant interval change.    MR thoracicspine: There has been posterior decompression at T10 since   the prior MRI from 11/26/2021. There is hyperintense cord signal at   T10-T11 which likely represents myelomalacia. No high-grade central canal   stenosis throughout the thoracic spine.    MR lumbar spine: Postoperative and degenerative changes as described. At   T12-L1 there is moderate to severe central canal stenosis which is   stable. At L1-L2 there is moderate central canal stenosis which appears   mildly worse since the prior study.      DIAMOND LORA MD; Attending Radiologist  This document has been electronically signed. Dre  3 2022  8:34PM          MEDICATIONS:  Antibiotics:    Neuro:  acetaminophen     Tablet .. 650 milliGRAM(s) Oral every 6 hours PRN Temp greater or equal to 38C (100.4F), Mild Pain (1 - 3)  acetaminophen   IVPB .. 1000 milliGRAM(s) IV Intermittent once  DULoxetine 20 milliGRAM(s) Oral daily  gabapentin 300 milliGRAM(s) Oral three times a day  HYDROmorphone  Injectable 0.5 milliGRAM(s) IV Push every 6 hours PRN Severe Pain (7 - 10)  methocarbamol 500 milliGRAM(s) Oral every 6 hours  oxycodone    5 mG/acetaminophen 325 mG 2 Tablet(s) Oral every 6 hours PRN Severe Pain (7 - 10)  oxyCODONE    IR 5 milliGRAM(s) Oral every 4 hours PRN Moderate Pain (4 - 6)    Cardiac:  amLODIPine   Tablet 5 milliGRAM(s) Oral daily  atenolol  Tablet 50 milliGRAM(s) Oral daily  lisinopril 20 milliGRAM(s) Oral daily    Pulm:    GI/:  bisacodyl Suppository 10 milliGRAM(s) Rectal daily PRN Constipation  pantoprazole    Tablet 40 milliGRAM(s) Oral before breakfast  polyethylene glycol 3350 17 Gram(s) Oral daily  senna 2 Tablet(s) Oral at bedtime    Other:   artificial  tears Solution 1 Drop(s) Both EYES three times a day  atorvastatin 80 milliGRAM(s) Oral at bedtime  dextrose 5%. 1000 milliLiter(s) IV Continuous <Continuous>  dextrose 5%. 1000 milliLiter(s) IV Continuous <Continuous>  dextrose 50% Injectable 25 Gram(s) IV Push once  dextrose 50% Injectable 12.5 Gram(s) IV Push once  dextrose 50% Injectable 25 Gram(s) IV Push once  dextrose Oral Gel 15 Gram(s) Oral once PRN Blood Glucose LESS THAN 70 milliGRAM(s)/deciliter  glucagon  Injectable 1 milliGRAM(s) IntraMuscular once  insulin lispro (ADMELOG) corrective regimen sliding scale   SubCutaneous three times a day before meals  sodium chloride 0.9%. 1000 milliLiter(s) IV Continuous <Continuous>         SUBJECTIVE: 81 y/o male with pmhx of HTN, HLD, DM, CAD s/p CABG scheduled for planned surgery on 12/5/22. Pt states he has an unsteady gait secondary to cervical stenosis, multiple falls, currently residing at [facility].  (04 Dec 2022 14:49). Pt had C3-C5 laminectomy on 12/12 with Dr. Sanches.     INTERVAL HX/OVERNIGHT EVENTS  POD#1 s/p C3-C5 laminectomy. Patient seen and examined at bedside. Pt reports significant neck and shoulder pain. Reports pain was improved yesterday with Dilaudid. Reports his legs felt weak today when working with PT. Denies chest pain, shortness of breath, nausea/vomiting. Last BM: 12/12. LOVELY drain in place-output 40cc. H&H borderline low on repeat labs, improved from 12/11. WBC count slightly elevated today. Afebrile.     Vital Signs Last 24 Hrs  T(C): 36.6 (12-13-22 @ 07:30), Max: 36.8 (12-12-22 @ 17:45)  T(F): 97.8 (12-13-22 @ 07:30), Max: 98.2 (12-12-22 @ 17:45)  HR: 62 (12-13-22 @ 10:01) (62 - 94)  BP: 127/57 (12-13-22 @ 10:01) (127/57 - 193/79)  BP(mean): 75 (12-13-22 @ 10:01) (75 - 113)  RR: 16 (12-13-22 @ 10:01) (12 - 21)  SpO2: 95% (12-13-22 @ 10:01) (92% - 99%)    PHYSICAL EXAM:    GENERAL: NAD      INCISION: Dressing in place, c/d/i       DRAINS: LOVELY drain in place with bulb on full suction, serosanguinous drainage present    HEAD: normocephalic     MENTAL STATUS: AAO x 3, conversant, following simple commands      CRANIAL NERVES: PERRL, EOMI without nystagmus. Face symmetric, Tongue is midline. Hearing grossly intact. Head turning and shoulder shrug intact.      REFLEXES: No pronator drift     MOTOR: strength 5/5 b/l upper and lower extremities     SENSATION: grossly intact to light touch all extremities     SKIN: Warm, dry        LABS:                          12.6   11.64 )-----------( 262      ( 13 Dec 2022 05:01 )             38.7    12-13    131<L>  |  93<L>  |  16.9  ----------------------------<  218<H>  4.2   |  23.0  |  0.91    Ca    8.9      13 Dec 2022 05:01        12-12 @ 07:01  -  12-13 @ 07:00  --------------------------------------------------------  IN: 500 mL / OUT: 1090 mL / NET: -590 mL        IMAGING:     MR Lumbar Spine No Cont (06.03.22 @ 19:17)   IMPRESSION:  MR cervical spine: Degenerative changes as described. High-grade   multilevel central canal and neural foraminal stenosis without   significant interval change.    MR thoracicspine: There has been posterior decompression at T10 since   the prior MRI from 11/26/2021. There is hyperintense cord signal at   T10-T11 which likely represents myelomalacia. No high-grade central canal   stenosis throughout the thoracic spine.    MR lumbar spine: Postoperative and degenerative changes as described. At   T12-L1 there is moderate to severe central canal stenosis which is   stable. At L1-L2 there is moderate central canal stenosis which appears   mildly worse since the prior study.      DIAMOND LORA MD; Attending Radiologist  This document has been electronically signed. Dre  3 2022  8:34PM          MEDICATIONS:  Antibiotics:    Neuro:  acetaminophen     Tablet .. 650 milliGRAM(s) Oral every 6 hours PRN Temp greater or equal to 38C (100.4F), Mild Pain (1 - 3)  acetaminophen   IVPB .. 1000 milliGRAM(s) IV Intermittent once  DULoxetine 20 milliGRAM(s) Oral daily  gabapentin 300 milliGRAM(s) Oral three times a day  HYDROmorphone  Injectable 0.5 milliGRAM(s) IV Push every 6 hours PRN Severe Pain (7 - 10)  methocarbamol 500 milliGRAM(s) Oral every 6 hours  oxycodone    5 mG/acetaminophen 325 mG 2 Tablet(s) Oral every 6 hours PRN Severe Pain (7 - 10)  oxyCODONE    IR 5 milliGRAM(s) Oral every 4 hours PRN Moderate Pain (4 - 6)    Cardiac:  amLODIPine   Tablet 5 milliGRAM(s) Oral daily  atenolol  Tablet 50 milliGRAM(s) Oral daily  lisinopril 20 milliGRAM(s) Oral daily    Pulm:    GI/:  bisacodyl Suppository 10 milliGRAM(s) Rectal daily PRN Constipation  pantoprazole    Tablet 40 milliGRAM(s) Oral before breakfast  polyethylene glycol 3350 17 Gram(s) Oral daily  senna 2 Tablet(s) Oral at bedtime    Other:   artificial  tears Solution 1 Drop(s) Both EYES three times a day  atorvastatin 80 milliGRAM(s) Oral at bedtime  dextrose 5%. 1000 milliLiter(s) IV Continuous <Continuous>  dextrose 5%. 1000 milliLiter(s) IV Continuous <Continuous>  dextrose 50% Injectable 25 Gram(s) IV Push once  dextrose 50% Injectable 12.5 Gram(s) IV Push once  dextrose 50% Injectable 25 Gram(s) IV Push once  dextrose Oral Gel 15 Gram(s) Oral once PRN Blood Glucose LESS THAN 70 milliGRAM(s)/deciliter  glucagon  Injectable 1 milliGRAM(s) IntraMuscular once  insulin lispro (ADMELOG) corrective regimen sliding scale   SubCutaneous three times a day before meals  sodium chloride 0.9%. 1000 milliLiter(s) IV Continuous <Continuous>

## 2022-12-13 NOTE — PHYSICAL THERAPY INITIAL EVALUATION ADULT - IMPAIRMENTS FOUND, PT EVAL
day(s)
aerobic capacity/endurance/gait, locomotion, and balance/muscle strength
aerobic capacity/endurance/decreased midline orientation/gait, locomotion, and balance/muscle strength/ROM

## 2022-12-13 NOTE — PHYSICAL THERAPY INITIAL EVALUATION ADULT - ADDITIONAL COMMENTS
[facility], reports limited mobility with RW and assist from therapist. Assist with ADLs.
pt was at subacute rehab prior to arrival, has been using the w/c as a primary mode of locomotion, at home pt has a w/c, ramp, RW, commode, shower chair and raised toilet seat, no stairs to negotiate

## 2022-12-13 NOTE — PHYSICAL THERAPY INITIAL EVALUATION ADULT - IMPAIRMENTS CONTRIBUTING TO GAIT DEVIATIONS, PT EVAL
impaired balance/impaired coordination/decreased flexibility/impaired motor control/abnormal muscle tone/narrow base of support/pain/impaired postural control/decreased ROM/decreased strength

## 2022-12-13 NOTE — PHYSICAL THERAPY INITIAL EVALUATION ADULT - GENERAL OBSERVATIONS, REHAB EVAL
received semi wilkins position in bed, NAD, agreeable to PT
Pt received supine with HOB elevated, IV, CM/, REED-Mireya, NAD

## 2022-12-13 NOTE — PROGRESS NOTE ADULT - ASSESSMENT
ASSESSMENT:   83 y/o male scheduled for planned surgery on 12/5/22. Pt states he has an unsteady gait secondary to cervical stenosis, multiple falls, currently residing at [facility].  (04 Dec 2022 14:49)      PLAN:    NEURO:  CT Head in AM, MRI post-op, Surgical drains per NSGY, Pain control  Activity: [] OOB as tolerated [] Bedrest [] PT [] OT [] PMNR    PULM:  Incentive spirometry, mobilize as tolerated    CV:  Keep -150mmHg, d/c a-line in AM    RENAL:  IVF until good PO intake, d/c rodriguez in AM    GI:  Diet: Dysphagia screen and then advance diet as tolerated  GI prophylaxis [] not indicated [] PPI [] other:  Bowel regimen [] colace [] senna [] other:    ENDO:   Goal euglycemia (-180)    HEME/ONC:  VTE prophylaxis: [] SCDs [] chemoprophylaxis [] hold chemoprophylaxis due to: [] high risk of DVT/PE on admission due to:    ID:  Clau-op antibiotics    MISC:    SOCIAL/FAMILY:  [] awaiting [] updated at bedside [] family meeting    CODE STATUS:  [] Full Code [] DNR [] DNI [] Palliative/Comfort Care    DISPOSITION:  [] ICU [] Stroke Unit [] Floor [] EMU [] RCU [] PCU ASSESSMENT:   81 y/o male with pmhx of HTN, HLD, DM, CAD s/p CABG now POD#1 s/p C3-C5 laminectomy.     PLAN:    -Continue q2h neuro checks  -Continue to monitor LOVELY drain output; decreased bulb to half suction  -Pain control prn; tylenol for mild pain, oxy 5 for moderate pain, percocet for severe pain; currently on robaxin 500mg q 6h  -H&H improving; WBC slightly elevated on morning labs, pt afebrile; ordered AM labs to re-eval  -Activity: OOB with Aspen collar to chair as tolerated, PT  -Aspen collar whenever OOB  -PT recommended-home PT with full assist and rolling walker  -Incentive spirometry, mobilize as tolerated  -Can d/c IVF given good PO tolerance; rodriguez d/c'd-monitor UO  -Continue ISS given hx of DM  -Bowel regimen: senna daily, miralax daily, and dulcolax prn  -VTE prophylaxis: SCDs, hold chemoprophylaxis due to increased bleeding risk s/p surgery  -Will further discuss case with Dr. Sanches

## 2022-12-13 NOTE — PHYSICAL THERAPY INITIAL EVALUATION ADULT - PERTINENT HX OF CURRENT PROBLEM, REHAB EVAL
presents for planned cervical decompression of C3-6 posterior cervical region with laminectomy and fusion, however procedure was delayed due to pt taking aspirin
Pt is 82M w/ PMHX HTN, HLD, DM, CAD s/p CABG admitted from [facility] for C3-6 posterior lami/fusion, 12/12

## 2022-12-14 LAB
ANION GAP SERPL CALC-SCNC: 12 MMOL/L — SIGNIFICANT CHANGE UP (ref 5–17)
BUN SERPL-MCNC: 16.1 MG/DL — SIGNIFICANT CHANGE UP (ref 8–20)
CALCIUM SERPL-MCNC: 9 MG/DL — SIGNIFICANT CHANGE UP (ref 8.4–10.5)
CHLORIDE SERPL-SCNC: 96 MMOL/L — SIGNIFICANT CHANGE UP (ref 96–108)
CO2 SERPL-SCNC: 26 MMOL/L — SIGNIFICANT CHANGE UP (ref 22–29)
CREAT SERPL-MCNC: 0.58 MG/DL — SIGNIFICANT CHANGE UP (ref 0.5–1.3)
EGFR: 98 ML/MIN/1.73M2 — SIGNIFICANT CHANGE UP
GLUCOSE BLDC GLUCOMTR-MCNC: 164 MG/DL — HIGH (ref 70–99)
GLUCOSE BLDC GLUCOMTR-MCNC: 169 MG/DL — HIGH (ref 70–99)
GLUCOSE BLDC GLUCOMTR-MCNC: 227 MG/DL — HIGH (ref 70–99)
GLUCOSE BLDC GLUCOMTR-MCNC: 229 MG/DL — HIGH (ref 70–99)
GLUCOSE SERPL-MCNC: 173 MG/DL — HIGH (ref 70–99)
HCT VFR BLD CALC: 36.7 % — LOW (ref 39–50)
HGB BLD-MCNC: 11.9 G/DL — LOW (ref 13–17)
MAGNESIUM SERPL-MCNC: 1.8 MG/DL — SIGNIFICANT CHANGE UP (ref 1.6–2.6)
MCHC RBC-ENTMCNC: 28.7 PG — SIGNIFICANT CHANGE UP (ref 27–34)
MCHC RBC-ENTMCNC: 32.4 GM/DL — SIGNIFICANT CHANGE UP (ref 32–36)
MCV RBC AUTO: 88.6 FL — SIGNIFICANT CHANGE UP (ref 80–100)
PHOSPHATE SERPL-MCNC: 3.8 MG/DL — SIGNIFICANT CHANGE UP (ref 2.4–4.7)
PLATELET # BLD AUTO: 269 K/UL — SIGNIFICANT CHANGE UP (ref 150–400)
POTASSIUM SERPL-MCNC: 4.7 MMOL/L — SIGNIFICANT CHANGE UP (ref 3.5–5.3)
POTASSIUM SERPL-SCNC: 4.7 MMOL/L — SIGNIFICANT CHANGE UP (ref 3.5–5.3)
RBC # BLD: 4.14 M/UL — LOW (ref 4.2–5.8)
RBC # FLD: 15 % — HIGH (ref 10.3–14.5)
SODIUM SERPL-SCNC: 134 MMOL/L — LOW (ref 135–145)
WBC # BLD: 11.82 K/UL — HIGH (ref 3.8–10.5)
WBC # FLD AUTO: 11.82 K/UL — HIGH (ref 3.8–10.5)

## 2022-12-14 RX ORDER — DIAZEPAM 5 MG
2 TABLET ORAL ONCE
Refills: 0 | Status: DISCONTINUED | OUTPATIENT
Start: 2022-12-14 | End: 2022-12-14

## 2022-12-14 RX ORDER — HYDROMORPHONE HYDROCHLORIDE 2 MG/ML
0.5 INJECTION INTRAMUSCULAR; INTRAVENOUS; SUBCUTANEOUS ONCE
Refills: 0 | Status: DISCONTINUED | OUTPATIENT
Start: 2022-12-14 | End: 2022-12-16

## 2022-12-14 RX ORDER — OXYCODONE HYDROCHLORIDE 5 MG/1
10 TABLET ORAL EVERY 12 HOURS
Refills: 0 | Status: DISCONTINUED | OUTPATIENT
Start: 2022-12-14 | End: 2022-12-16

## 2022-12-14 RX ADMIN — ATORVASTATIN CALCIUM 80 MILLIGRAM(S): 80 TABLET, FILM COATED ORAL at 21:18

## 2022-12-14 RX ADMIN — Medication 2: at 12:28

## 2022-12-14 RX ADMIN — PANTOPRAZOLE SODIUM 40 MILLIGRAM(S): 20 TABLET, DELAYED RELEASE ORAL at 06:06

## 2022-12-14 RX ADMIN — Medication 2: at 08:50

## 2022-12-14 RX ADMIN — HYDROMORPHONE HYDROCHLORIDE 0.5 MILLIGRAM(S): 2 INJECTION INTRAMUSCULAR; INTRAVENOUS; SUBCUTANEOUS at 12:58

## 2022-12-14 RX ADMIN — Medication 1 DROP(S): at 14:03

## 2022-12-14 RX ADMIN — GABAPENTIN 300 MILLIGRAM(S): 400 CAPSULE ORAL at 21:18

## 2022-12-14 RX ADMIN — Medication 4 MILLIGRAM(S): at 21:18

## 2022-12-14 RX ADMIN — GABAPENTIN 300 MILLIGRAM(S): 400 CAPSULE ORAL at 06:04

## 2022-12-14 RX ADMIN — METHOCARBAMOL 750 MILLIGRAM(S): 500 TABLET, FILM COATED ORAL at 06:04

## 2022-12-14 RX ADMIN — AMLODIPINE BESYLATE 5 MILLIGRAM(S): 2.5 TABLET ORAL at 06:04

## 2022-12-14 RX ADMIN — Medication 4 MILLIGRAM(S): at 14:06

## 2022-12-14 RX ADMIN — DULOXETINE HYDROCHLORIDE 20 MILLIGRAM(S): 30 CAPSULE, DELAYED RELEASE ORAL at 17:32

## 2022-12-14 RX ADMIN — METHOCARBAMOL 750 MILLIGRAM(S): 500 TABLET, FILM COATED ORAL at 19:37

## 2022-12-14 RX ADMIN — METHOCARBAMOL 750 MILLIGRAM(S): 500 TABLET, FILM COATED ORAL at 12:33

## 2022-12-14 RX ADMIN — LISINOPRIL 20 MILLIGRAM(S): 2.5 TABLET ORAL at 06:04

## 2022-12-14 RX ADMIN — Medication 4: at 17:33

## 2022-12-14 RX ADMIN — METHOCARBAMOL 750 MILLIGRAM(S): 500 TABLET, FILM COATED ORAL at 00:10

## 2022-12-14 RX ADMIN — GABAPENTIN 300 MILLIGRAM(S): 400 CAPSULE ORAL at 14:04

## 2022-12-14 RX ADMIN — HYDROMORPHONE HYDROCHLORIDE 0.5 MILLIGRAM(S): 2 INJECTION INTRAMUSCULAR; INTRAVENOUS; SUBCUTANEOUS at 12:28

## 2022-12-14 RX ADMIN — Medication 4 MILLIGRAM(S): at 06:04

## 2022-12-14 RX ADMIN — Medication 1 DROP(S): at 21:19

## 2022-12-14 RX ADMIN — Medication 2 MILLIGRAM(S): at 14:03

## 2022-12-14 NOTE — PROGRESS NOTE ADULT - SUBJECTIVE AND OBJECTIVE BOX
INTERVAL HPI/OVERNIGHT EVENTS:  83 y/o male with pmhx of HTN, HLD, DM, CAD s/p CABG scheduled for planned surgery on 12/5/22. Pt states he has an unsteady gait secondary to cervical stenosis, multiple falls, currently residing at [facility].  (04 Dec 2022 14:49). Pt had C3-C5 laminectomy on 12/12 with Dr. Sanches.     Today post op day # 2 Pt is complaining of intense pain of the neck, shoulders and upper back.  Pt states he has had minimum relief with pain meds  pt does state the the actual hand numbness has improved.     MEDICATIONS  (STANDING):  amLODIPine   Tablet 5 milliGRAM(s) Oral daily  artificial  tears Solution 1 Drop(s) Both EYES three times a day  atenolol  Tablet 50 milliGRAM(s) Oral daily  atorvastatin 80 milliGRAM(s) Oral at bedtime  dexAMETHasone  Injectable 4 milliGRAM(s) IV Push every 8 hours  dextrose 5%. 1000 milliLiter(s) (100 mL/Hr) IV Continuous <Continuous>  dextrose 5%. 1000 milliLiter(s) (50 mL/Hr) IV Continuous <Continuous>  dextrose 50% Injectable 25 Gram(s) IV Push once  dextrose 50% Injectable 12.5 Gram(s) IV Push once  dextrose 50% Injectable 25 Gram(s) IV Push once  DULoxetine 20 milliGRAM(s) Oral daily  gabapentin 300 milliGRAM(s) Oral three times a day  glucagon  Injectable 1 milliGRAM(s) IntraMuscular once  insulin lispro (ADMELOG) corrective regimen sliding scale   SubCutaneous three times a day before meals  lisinopril 20 milliGRAM(s) Oral daily  methocarbamol 750 milliGRAM(s) Oral every 6 hours  pantoprazole    Tablet 40 milliGRAM(s) Oral before breakfast  polyethylene glycol 3350 17 Gram(s) Oral daily  senna 2 Tablet(s) Oral at bedtime    MEDICATIONS  (PRN):  acetaminophen     Tablet .. 650 milliGRAM(s) Oral every 6 hours PRN Temp greater or equal to 38C (100.4F), Mild Pain (1 - 3)  bisacodyl Suppository 10 milliGRAM(s) Rectal daily PRN Constipation  dextrose Oral Gel 15 Gram(s) Oral once PRN Blood Glucose LESS THAN 70 milliGRAM(s)/deciliter  HYDROmorphone  Injectable 0.5 milliGRAM(s) IV Push every 4 hours PRN breakthrough pain  oxycodone    5 mG/acetaminophen 325 mG 2 Tablet(s) Oral every 6 hours PRN Severe Pain (7 - 10)  oxyCODONE    IR 5 milliGRAM(s) Oral every 4 hours PRN Moderate Pain (4 - 6)      Allergies    No Known Allergies    Intolerances          Vital Signs Last 24 Hrs  T(C): 36.6 (14 Dec 2022 07:56), Max: 36.7 (14 Dec 2022 00:00)  T(F): 97.8 (14 Dec 2022 07:56), Max: 98.1 (14 Dec 2022 00:00)  HR: 61 (14 Dec 2022 12:00) (54 - 69)  BP: 136/51 (14 Dec 2022 12:00) (133/44 - 163/88)  BP(mean): 74 (14 Dec 2022 12:00) (68 - 83)  RR: 18 (14 Dec 2022 12:00) (12 - 19)  SpO2: 96% (14 Dec 2022 12:00) (92% - 98%)    Parameters below as of 14 Dec 2022 12:00  Patient On (Oxygen Delivery Method): room air     BMI (kg/m2): 24.4 (12-11-22 @ 21:01)      PHYSICAL EXAM  GENERAL: NAD  HEAD:  Atraumatic, Normocephalic  EYES: EOMI, PERRLA, conjunctiva and sclera clear  ENMT: No tonsillar erythema, exudates, or enlargement; Moist mucous membranes, Good dentition, No lesions  NECK: Supple, posterior cervical dressing in place.   NERVOUS SYSTEM:  Alert & Oriented X3, Good concentration; Motor Strength 5/5 B/L upper and lower extremities; DTRs 2+ intact and symmetric  CHEST/LUNG: Clear BS bilaterally;   HEART: Regular rate and rhythm; No murmurs, rubs, or gallops  ABDOMEN: Soft, Nontender, Nondistended; Bowel sounds present  EXTREMITIES:  2+ Peripheral Pulses, No edema, non tender with palp of calf bilat  Wound dressing post cervical region - drain x 1 45 cc /24 hours   muscular skeletal: post traps tension bilat    LABS:                          11.9   11.82 )-----------( 269      ( 14 Dec 2022 06:45 )             36.7     12-14    134<L>  |  96  |  16.1  ----------------------------<  173<H>  4.7   |  26.0  |  0.58    Ca    9.0      14 Dec 2022 06:45  Phos  3.8     12-14  Mg     1.8     12-14          I&O's Detail    13 Dec 2022 07:01  -  14 Dec 2022 07:00  --------------------------------------------------------  IN:    Oral Fluid: 720 mL    sodium chloride 0.9%: 300 mL  Total IN: 1020 mL    OUT:    Drain (mL): 45 mL    Intermittent Catheterization - Urethral (mL): 750 mL    Voided (mL): 800 mL  Total OUT: 1595 mL    Total NET: -575 mL      RADIOLOGY & ADDITIONAL TESTS:  < from: CT Cervical Spine No Cont (06.02.22 @ 23:56) >  ACC: [id number] EXAM:  CT CERVICAL SPINE                        PROCEDURE DATE:  06/02/2022    IMPRESSION:  Multilevel degenerative changes of the cervical spine without evidence of   fracture.  --- End of Report ---

## 2022-12-14 NOTE — PROGRESS NOTE ADULT - ASSESSMENT
81ym s/p cervical floyd for decompression of stenosis    Plan  -pain control  -muscular spasm of the cervical region -pt is on Robaxin  -drain discontinued.   -Physical therapy following  -discharge planning.

## 2022-12-15 LAB
ANION GAP SERPL CALC-SCNC: 10 MMOL/L — SIGNIFICANT CHANGE UP (ref 5–17)
BUN SERPL-MCNC: 24.1 MG/DL — HIGH (ref 8–20)
CALCIUM SERPL-MCNC: 8.7 MG/DL — SIGNIFICANT CHANGE UP (ref 8.4–10.5)
CHLORIDE SERPL-SCNC: 99 MMOL/L — SIGNIFICANT CHANGE UP (ref 96–108)
CO2 SERPL-SCNC: 26 MMOL/L — SIGNIFICANT CHANGE UP (ref 22–29)
CREAT SERPL-MCNC: 0.67 MG/DL — SIGNIFICANT CHANGE UP (ref 0.5–1.3)
EGFR: 94 ML/MIN/1.73M2 — SIGNIFICANT CHANGE UP
GLUCOSE BLDC GLUCOMTR-MCNC: 194 MG/DL — HIGH (ref 70–99)
GLUCOSE BLDC GLUCOMTR-MCNC: 207 MG/DL — HIGH (ref 70–99)
GLUCOSE BLDC GLUCOMTR-MCNC: 238 MG/DL — HIGH (ref 70–99)
GLUCOSE SERPL-MCNC: 202 MG/DL — HIGH (ref 70–99)
HCT VFR BLD CALC: 35.2 % — LOW (ref 39–50)
HGB BLD-MCNC: 11.4 G/DL — LOW (ref 13–17)
MAGNESIUM SERPL-MCNC: 1.9 MG/DL — SIGNIFICANT CHANGE UP (ref 1.6–2.6)
MCHC RBC-ENTMCNC: 29.2 PG — SIGNIFICANT CHANGE UP (ref 27–34)
MCHC RBC-ENTMCNC: 32.4 GM/DL — SIGNIFICANT CHANGE UP (ref 32–36)
MCV RBC AUTO: 90 FL — SIGNIFICANT CHANGE UP (ref 80–100)
PHOSPHATE SERPL-MCNC: 2.7 MG/DL — SIGNIFICANT CHANGE UP (ref 2.4–4.7)
PLATELET # BLD AUTO: 283 K/UL — SIGNIFICANT CHANGE UP (ref 150–400)
POTASSIUM SERPL-MCNC: 4.8 MMOL/L — SIGNIFICANT CHANGE UP (ref 3.5–5.3)
POTASSIUM SERPL-SCNC: 4.8 MMOL/L — SIGNIFICANT CHANGE UP (ref 3.5–5.3)
RBC # BLD: 3.91 M/UL — LOW (ref 4.2–5.8)
RBC # FLD: 15.1 % — HIGH (ref 10.3–14.5)
SODIUM SERPL-SCNC: 135 MMOL/L — SIGNIFICANT CHANGE UP (ref 135–145)
WBC # BLD: 10.95 K/UL — HIGH (ref 3.8–10.5)
WBC # FLD AUTO: 10.95 K/UL — HIGH (ref 3.8–10.5)

## 2022-12-15 RX ORDER — ENOXAPARIN SODIUM 100 MG/ML
40 INJECTION SUBCUTANEOUS
Refills: 0 | Status: DISCONTINUED | OUTPATIENT
Start: 2022-12-15 | End: 2022-12-16

## 2022-12-15 RX ORDER — DEXAMETHASONE 0.5 MG/5ML
4 ELIXIR ORAL EVERY 12 HOURS
Refills: 0 | Status: COMPLETED | OUTPATIENT
Start: 2022-12-15 | End: 2022-12-16

## 2022-12-15 RX ORDER — DEXAMETHASONE 0.5 MG/5ML
2 ELIXIR ORAL DAILY
Refills: 0 | Status: DISCONTINUED | OUTPATIENT
Start: 2022-12-17 | End: 2022-12-16

## 2022-12-15 RX ORDER — ACETAMINOPHEN 500 MG
975 TABLET ORAL ONCE
Refills: 0 | Status: COMPLETED | OUTPATIENT
Start: 2022-12-15 | End: 2022-12-15

## 2022-12-15 RX ORDER — DEXAMETHASONE 0.5 MG/5ML
ELIXIR ORAL
Refills: 0 | Status: DISCONTINUED | OUTPATIENT
Start: 2022-12-16 | End: 2022-12-16

## 2022-12-15 RX ORDER — DEXAMETHASONE 0.5 MG/5ML
2 ELIXIR ORAL EVERY 12 HOURS
Refills: 0 | Status: DISCONTINUED | OUTPATIENT
Start: 2022-12-16 | End: 2022-12-16

## 2022-12-15 RX ADMIN — SENNA PLUS 2 TABLET(S): 8.6 TABLET ORAL at 21:37

## 2022-12-15 RX ADMIN — METHOCARBAMOL 750 MILLIGRAM(S): 500 TABLET, FILM COATED ORAL at 00:34

## 2022-12-15 RX ADMIN — OXYCODONE HYDROCHLORIDE 10 MILLIGRAM(S): 5 TABLET ORAL at 06:10

## 2022-12-15 RX ADMIN — Medication 4 MILLIGRAM(S): at 23:32

## 2022-12-15 RX ADMIN — LISINOPRIL 20 MILLIGRAM(S): 2.5 TABLET ORAL at 05:09

## 2022-12-15 RX ADMIN — SODIUM CHLORIDE 3 MILLILITER(S): 9 INJECTION INTRAMUSCULAR; INTRAVENOUS; SUBCUTANEOUS at 22:51

## 2022-12-15 RX ADMIN — GABAPENTIN 300 MILLIGRAM(S): 400 CAPSULE ORAL at 10:53

## 2022-12-15 RX ADMIN — Medication 4 MILLIGRAM(S): at 05:10

## 2022-12-15 RX ADMIN — Medication 4 MILLIGRAM(S): at 12:40

## 2022-12-15 RX ADMIN — METHOCARBAMOL 750 MILLIGRAM(S): 500 TABLET, FILM COATED ORAL at 10:53

## 2022-12-15 RX ADMIN — METHOCARBAMOL 750 MILLIGRAM(S): 500 TABLET, FILM COATED ORAL at 05:10

## 2022-12-15 RX ADMIN — Medication 1 DROP(S): at 05:09

## 2022-12-15 RX ADMIN — METHOCARBAMOL 750 MILLIGRAM(S): 500 TABLET, FILM COATED ORAL at 23:31

## 2022-12-15 RX ADMIN — OXYCODONE HYDROCHLORIDE 5 MILLIGRAM(S): 5 TABLET ORAL at 11:55

## 2022-12-15 RX ADMIN — OXYCODONE HYDROCHLORIDE 10 MILLIGRAM(S): 5 TABLET ORAL at 16:10

## 2022-12-15 RX ADMIN — PANTOPRAZOLE SODIUM 40 MILLIGRAM(S): 20 TABLET, DELAYED RELEASE ORAL at 05:09

## 2022-12-15 RX ADMIN — Medication 4: at 11:15

## 2022-12-15 RX ADMIN — Medication 2: at 07:47

## 2022-12-15 RX ADMIN — POLYETHYLENE GLYCOL 3350 17 GRAM(S): 17 POWDER, FOR SOLUTION ORAL at 10:54

## 2022-12-15 RX ADMIN — ATORVASTATIN CALCIUM 80 MILLIGRAM(S): 80 TABLET, FILM COATED ORAL at 21:37

## 2022-12-15 RX ADMIN — GABAPENTIN 300 MILLIGRAM(S): 400 CAPSULE ORAL at 05:10

## 2022-12-15 RX ADMIN — AMLODIPINE BESYLATE 5 MILLIGRAM(S): 2.5 TABLET ORAL at 05:09

## 2022-12-15 RX ADMIN — Medication 4: at 16:10

## 2022-12-15 RX ADMIN — ENOXAPARIN SODIUM 40 MILLIGRAM(S): 100 INJECTION SUBCUTANEOUS at 16:09

## 2022-12-15 RX ADMIN — DULOXETINE HYDROCHLORIDE 20 MILLIGRAM(S): 30 CAPSULE, DELAYED RELEASE ORAL at 10:54

## 2022-12-15 RX ADMIN — GABAPENTIN 300 MILLIGRAM(S): 400 CAPSULE ORAL at 21:37

## 2022-12-15 RX ADMIN — METHOCARBAMOL 750 MILLIGRAM(S): 500 TABLET, FILM COATED ORAL at 16:10

## 2022-12-15 RX ADMIN — ATENOLOL 50 MILLIGRAM(S): 25 TABLET ORAL at 05:10

## 2022-12-15 RX ADMIN — OXYCODONE HYDROCHLORIDE 10 MILLIGRAM(S): 5 TABLET ORAL at 05:10

## 2022-12-15 RX ADMIN — OXYCODONE HYDROCHLORIDE 5 MILLIGRAM(S): 5 TABLET ORAL at 10:53

## 2022-12-15 RX ADMIN — Medication 975 MILLIGRAM(S): at 21:36

## 2022-12-15 RX ADMIN — Medication 975 MILLIGRAM(S): at 22:36

## 2022-12-15 RX ADMIN — Medication 4 MILLIGRAM(S): at 10:53

## 2022-12-15 RX ADMIN — Medication 1 DROP(S): at 10:54

## 2022-12-15 NOTE — PROGRESS NOTE ADULT - ASSESSMENT
81 y/o male with pmhx of HTN, HLD, DM, CAD s/p CABG now POD#3 s/p C3-C5 laminectomy.     Plan:  -D/w Dr. Talley   -Q4h neuro checks  -Pain control PRN  -Dispo planning; PT rec home PT with 24 hr assist, care coordinators contacted to initiate  83 y/o male with pmhx of HTN, HLD, DM, CAD s/p CABG now POD#3 s/p C3-C5 laminectomy.     Plan:  -D/w Dr. Talley   -Q4h neuro checks  -Pain control PRN; Dilaudid D/C'd, continue Oxycodone, Percocet, Robaxin   -Dex taper ordered  -SCDs & Lovenox for DVT PPX  -PT/OT/OOB encouraged  -Dispo planning; PT rec home PT with 24 hr assist, care coordinators contacted to initiate

## 2022-12-15 NOTE — PROGRESS NOTE ADULT - SUBJECTIVE AND OBJECTIVE BOX
HPI:  81 y/o male with pmhx of HTN, HLD, DM, CAD s/p CABG scheduled for planned surgery on 12/5/22. Pt states he has an unsteady gait secondary to cervical stenosis, multiple falls, currently residing at [facility].  (04 Dec 2022 14:49).     s/p C3-C5 laminectomy on 12/12.    INTERVAL HPI/OVERNIGHT EVENTS:  LOVELY drain D/C'd yesterday.     Vital Signs Last 24 Hrs  T(C): 36.6 (15 Dec 2022 07:31), Max: 36.7 (14 Dec 2022 16:30)  T(F): 97.9 (15 Dec 2022 07:31), Max: 98.1 (15 Dec 2022 00:00)  HR: 52 (15 Dec 2022 07:31) (52 - 72)  BP: 142/62 (15 Dec 2022 07:31) (133/69 - 159/75)  BP(mean): 92 (15 Dec 2022 05:00) (74 - 103)  RR: 19 (15 Dec 2022 07:31) (18 - 19)  SpO2: 96% (15 Dec 2022 07:31) (93% - 96%)    Parameters below as of 15 Dec 2022 05:00  Patient On (Oxygen Delivery Method): room air    PHYSICAL EXAM:  GENERAL: NAD, well-groomed  HEAD:  Atraumatic, normocephalic  WOUND: Dressing clean dry intact  MENTAL STATUS: AAO x3; Appropriately conversant without aphasia; following simple commands  CRANIAL NERVES: PERRL.   MOTOR: strength 5/5 b/l upper and lower extremities  SENSATION: grossly intact to light touch all extremities  SKIN: Warm, dry; no rashes or lesions    LABS:                        11.4   10.95 )-----------( 283      ( 15 Dec 2022 05:07 )             35.2     12-15    135  |  99  |  24.1<H>  ----------------------------<  202<H>  4.8   |  26.0  |  0.67    Ca    8.7      15 Dec 2022 05:07  Phos  2.7     12-15  Mg     1.9     12-15            12-14 @ 07:01  -  12-15 @ 07:00  --------------------------------------------------------  IN: 0 mL / OUT: 1301 mL / NET: -1301 mL        RADIOLOGY & ADDITIONAL TESTS:  No recent imaging.  HPI:  81 y/o male with pmhx of HTN, HLD, DM, CAD s/p CABG scheduled for planned surgery on 12/5/22. Pt states he has an unsteady gait secondary to cervical stenosis, multiple falls, currently residing at [facility].  (04 Dec 2022 14:49).     s/p C3-C5 laminectomy on 12/12.    INTERVAL HPI/OVERNIGHT EVENTS:  LOVELY drain D/C'd yesterday. Pt. states that his pain is much improved today with the addition of muscle relaxers.    Vital Signs Last 24 Hrs  T(C): 36.6 (15 Dec 2022 07:31), Max: 36.7 (14 Dec 2022 16:30)  T(F): 97.9 (15 Dec 2022 07:31), Max: 98.1 (15 Dec 2022 00:00)  HR: 52 (15 Dec 2022 07:31) (52 - 72)  BP: 142/62 (15 Dec 2022 07:31) (133/69 - 159/75)  BP(mean): 92 (15 Dec 2022 05:00) (74 - 103)  RR: 19 (15 Dec 2022 07:31) (18 - 19)  SpO2: 96% (15 Dec 2022 07:31) (93% - 96%)    Parameters below as of 15 Dec 2022 05:00  Patient On (Oxygen Delivery Method): room air    PHYSICAL EXAM:  GENERAL: NAD, well-groomed  HEAD:  Atraumatic, normocephalic  WOUND: Dressing clean dry intact  MENTAL STATUS: AAO x3; Appropriately conversant without aphasia; following simple commands  CRANIAL NERVES: PERRL.   MOTOR: strength 5/5 b/l upper and lower extremities  SENSATION: grossly intact to light touch all extremities  SKIN: Warm, dry; no rashes or lesions    LABS:                        11.4   10.95 )-----------( 283      ( 15 Dec 2022 05:07 )             35.2     12-15    135  |  99  |  24.1<H>  ----------------------------<  202<H>  4.8   |  26.0  |  0.67    Ca    8.7      15 Dec 2022 05:07  Phos  2.7     12-15  Mg     1.9     12-15            12-14 @ 07:01  -  12-15 @ 07:00  --------------------------------------------------------  IN: 0 mL / OUT: 1301 mL / NET: -1301 mL        RADIOLOGY & ADDITIONAL TESTS:  No recent imaging.

## 2022-12-15 NOTE — PROGRESS NOTE ADULT - REASON FOR ADMISSION
Cervical Stenosis

## 2022-12-16 ENCOUNTER — TRANSCRIPTION ENCOUNTER (OUTPATIENT)
Age: 82
End: 2022-12-16

## 2022-12-16 VITALS
RESPIRATION RATE: 18 BRPM | TEMPERATURE: 98 F | OXYGEN SATURATION: 95 % | DIASTOLIC BLOOD PRESSURE: 77 MMHG | SYSTOLIC BLOOD PRESSURE: 149 MMHG | HEART RATE: 49 BPM

## 2022-12-16 LAB
GLUCOSE BLDC GLUCOMTR-MCNC: 158 MG/DL — HIGH (ref 70–99)
GLUCOSE BLDC GLUCOMTR-MCNC: 167 MG/DL — HIGH (ref 70–99)
GLUCOSE BLDC GLUCOMTR-MCNC: 171 MG/DL — HIGH (ref 70–99)

## 2022-12-16 PROCEDURE — 76000 FLUOROSCOPY <1 HR PHYS/QHP: CPT

## 2022-12-16 PROCEDURE — 86900 BLOOD TYPING SEROLOGIC ABO: CPT

## 2022-12-16 PROCEDURE — 86901 BLOOD TYPING SEROLOGIC RH(D): CPT

## 2022-12-16 PROCEDURE — 85610 PROTHROMBIN TIME: CPT

## 2022-12-16 PROCEDURE — 83036 HEMOGLOBIN GLYCOSYLATED A1C: CPT

## 2022-12-16 PROCEDURE — 80053 COMPREHEN METABOLIC PANEL: CPT

## 2022-12-16 PROCEDURE — 87640 STAPH A DNA AMP PROBE: CPT

## 2022-12-16 PROCEDURE — 36415 COLL VENOUS BLD VENIPUNCTURE: CPT

## 2022-12-16 PROCEDURE — 82962 GLUCOSE BLOOD TEST: CPT

## 2022-12-16 PROCEDURE — 85025 COMPLETE CBC W/AUTO DIFF WBC: CPT

## 2022-12-16 PROCEDURE — U0003: CPT

## 2022-12-16 PROCEDURE — 87641 MR-STAPH DNA AMP PROBE: CPT

## 2022-12-16 PROCEDURE — 84100 ASSAY OF PHOSPHORUS: CPT

## 2022-12-16 PROCEDURE — 80048 BASIC METABOLIC PNL TOTAL CA: CPT

## 2022-12-16 PROCEDURE — 93005 ELECTROCARDIOGRAM TRACING: CPT

## 2022-12-16 PROCEDURE — 84134 ASSAY OF PREALBUMIN: CPT

## 2022-12-16 PROCEDURE — 97163 PT EVAL HIGH COMPLEX 45 MIN: CPT

## 2022-12-16 PROCEDURE — 71046 X-RAY EXAM CHEST 2 VIEWS: CPT

## 2022-12-16 PROCEDURE — 86850 RBC ANTIBODY SCREEN: CPT

## 2022-12-16 PROCEDURE — 81003 URINALYSIS AUTO W/O SCOPE: CPT

## 2022-12-16 PROCEDURE — C1889: CPT

## 2022-12-16 PROCEDURE — 85730 THROMBOPLASTIN TIME PARTIAL: CPT

## 2022-12-16 PROCEDURE — 83735 ASSAY OF MAGNESIUM: CPT

## 2022-12-16 PROCEDURE — U0005: CPT

## 2022-12-16 PROCEDURE — 85027 COMPLETE CBC AUTOMATED: CPT

## 2022-12-16 PROCEDURE — C1763: CPT

## 2022-12-16 RX ORDER — METHOCARBAMOL 500 MG/1
1 TABLET, FILM COATED ORAL
Qty: 42 | Refills: 0
Start: 2022-12-16 | End: 2022-12-29

## 2022-12-16 RX ORDER — GABAPENTIN 400 MG/1
1 CAPSULE ORAL
Qty: 90 | Refills: 0
Start: 2022-12-16 | End: 2023-01-14

## 2022-12-16 RX ORDER — POLYETHYLENE GLYCOL 3350 17 G/17G
17 POWDER, FOR SOLUTION ORAL
Qty: 0 | Refills: 0 | DISCHARGE
Start: 2022-12-16

## 2022-12-16 RX ORDER — PANTOPRAZOLE SODIUM 20 MG/1
1 TABLET, DELAYED RELEASE ORAL
Qty: 0 | Refills: 0 | DISCHARGE
Start: 2022-12-16

## 2022-12-16 RX ORDER — DEXAMETHASONE 0.5 MG/5ML
1 ELIXIR ORAL
Qty: 5 | Refills: 0
Start: 2022-12-16 | End: 2022-12-20

## 2022-12-16 RX ORDER — OXYCODONE HYDROCHLORIDE 5 MG/1
1 TABLET ORAL
Qty: 21 | Refills: 0
Start: 2022-12-16 | End: 2022-12-22

## 2022-12-16 RX ORDER — DULOXETINE HYDROCHLORIDE 30 MG/1
1 CAPSULE, DELAYED RELEASE ORAL
Qty: 0 | Refills: 0 | DISCHARGE
Start: 2022-12-16

## 2022-12-16 RX ORDER — LISINOPRIL 2.5 MG/1
1 TABLET ORAL
Qty: 0 | Refills: 0 | DISCHARGE
Start: 2022-12-16

## 2022-12-16 RX ORDER — ATENOLOL 25 MG/1
1 TABLET ORAL
Qty: 0 | Refills: 0 | DISCHARGE
Start: 2022-12-16

## 2022-12-16 RX ORDER — ATORVASTATIN CALCIUM 80 MG/1
1 TABLET, FILM COATED ORAL
Qty: 0 | Refills: 0 | DISCHARGE
Start: 2022-12-16

## 2022-12-16 RX ORDER — OXYCODONE AND ACETAMINOPHEN 5; 325 MG/1; MG/1
2 TABLET ORAL
Qty: 0 | Refills: 0 | DISCHARGE
Start: 2022-12-16

## 2022-12-16 RX ADMIN — METHOCARBAMOL 750 MILLIGRAM(S): 500 TABLET, FILM COATED ORAL at 10:28

## 2022-12-16 RX ADMIN — DULOXETINE HYDROCHLORIDE 20 MILLIGRAM(S): 30 CAPSULE, DELAYED RELEASE ORAL at 13:43

## 2022-12-16 RX ADMIN — SODIUM CHLORIDE 3 MILLILITER(S): 9 INJECTION INTRAMUSCULAR; INTRAVENOUS; SUBCUTANEOUS at 07:01

## 2022-12-16 RX ADMIN — ATENOLOL 50 MILLIGRAM(S): 25 TABLET ORAL at 05:15

## 2022-12-16 RX ADMIN — Medication 2: at 13:54

## 2022-12-16 RX ADMIN — OXYCODONE HYDROCHLORIDE 10 MILLIGRAM(S): 5 TABLET ORAL at 05:16

## 2022-12-16 RX ADMIN — AMLODIPINE BESYLATE 5 MILLIGRAM(S): 2.5 TABLET ORAL at 05:15

## 2022-12-16 RX ADMIN — GABAPENTIN 300 MILLIGRAM(S): 400 CAPSULE ORAL at 05:16

## 2022-12-16 RX ADMIN — PANTOPRAZOLE SODIUM 40 MILLIGRAM(S): 20 TABLET, DELAYED RELEASE ORAL at 05:16

## 2022-12-16 RX ADMIN — SODIUM CHLORIDE 3 MILLILITER(S): 9 INJECTION INTRAMUSCULAR; INTRAVENOUS; SUBCUTANEOUS at 13:42

## 2022-12-16 RX ADMIN — Medication 2: at 07:53

## 2022-12-16 RX ADMIN — METHOCARBAMOL 750 MILLIGRAM(S): 500 TABLET, FILM COATED ORAL at 05:15

## 2022-12-16 RX ADMIN — GABAPENTIN 300 MILLIGRAM(S): 400 CAPSULE ORAL at 13:46

## 2022-12-16 RX ADMIN — OXYCODONE HYDROCHLORIDE 10 MILLIGRAM(S): 5 TABLET ORAL at 06:16

## 2022-12-16 RX ADMIN — LISINOPRIL 20 MILLIGRAM(S): 2.5 TABLET ORAL at 05:15

## 2022-12-16 RX ADMIN — POLYETHYLENE GLYCOL 3350 17 GRAM(S): 17 POWDER, FOR SOLUTION ORAL at 13:41

## 2022-12-16 RX ADMIN — Medication 4 MILLIGRAM(S): at 05:15

## 2022-12-16 NOTE — DISCHARGE NOTE NURSING/CASE MANAGEMENT/SOCIAL WORK - NSDCPEFALRISK_GEN_ALL_CORE
For information on Fall & Injury Prevention, visit: [email or web address] OR  [email or web address] OR  https://www.cdc.gov/steadi/patient.html

## 2022-12-16 NOTE — DISCHARGE NOTE NURSING/CASE MANAGEMENT/SOCIAL WORK - NSDCFUADDAPPT_GEN_ALL_CORE_FT
Please follow up with Dr. Sanches by 12/26 to have your staples removed.    Follow up with your PCP as soon as you are able.

## 2022-12-16 NOTE — PHYSICAL THERAPY INITIAL EVALUATION ADULT - PLANNED THERAPY INTERVENTIONS, PT EVAL
balance training/bed mobility training/gait training/neuromuscular re-education/ROM/strengthening/transfer training
No
balance training/bed mobility training/gait training/strengthening/transfer training

## 2022-12-16 NOTE — DISCHARGE NOTE NURSING/CASE MANAGEMENT/SOCIAL WORK - PATIENT PORTAL LINK FT
You can access the FollowMyHealth Patient Portal offered by [facility] by registering at the following website: [email or web address]. By joining [employer]’s FollowMyHealth portal, you will also be able to view your health information using other applications (apps) compatible with our system.

## 2022-12-26 ENCOUNTER — EMERGENCY (EMERGENCY)
Facility: HOSPITAL | Age: 82
LOS: 1 days | Discharge: HOME | End: 2022-12-26
Attending: STUDENT IN AN ORGANIZED HEALTH CARE EDUCATION/TRAINING PROGRAM
Payer: MEDICARE

## 2022-12-26 VITALS
HEART RATE: 68 BPM | WEIGHT: 169.98 LBS | OXYGEN SATURATION: 97 % | RESPIRATION RATE: 18 BRPM | HEIGHT: 69 IN | DIASTOLIC BLOOD PRESSURE: 89 MMHG | TEMPERATURE: 98 F | SYSTOLIC BLOOD PRESSURE: 157 MMHG

## 2022-12-26 VITALS
OXYGEN SATURATION: 98 % | TEMPERATURE: 98 F | HEART RATE: 55 BPM | SYSTOLIC BLOOD PRESSURE: 114 MMHG | RESPIRATION RATE: 18 BRPM | DIASTOLIC BLOOD PRESSURE: 63 MMHG

## 2022-12-26 DIAGNOSIS — Z95.1 PRESENCE OF AORTOCORONARY BYPASS GRAFT: Chronic | ICD-10-CM

## 2022-12-26 LAB
ALBUMIN SERPL ELPH-MCNC: 3.2 G/DL — LOW (ref 3.3–5.2)
ALP SERPL-CCNC: 77 U/L — SIGNIFICANT CHANGE UP (ref 40–120)
ALT FLD-CCNC: 14 U/L — SIGNIFICANT CHANGE UP
ANION GAP SERPL CALC-SCNC: 10 MMOL/L — SIGNIFICANT CHANGE UP (ref 5–17)
APPEARANCE UR: CLEAR — SIGNIFICANT CHANGE UP
AST SERPL-CCNC: 20 U/L — SIGNIFICANT CHANGE UP
BASOPHILS # BLD AUTO: 0.03 K/UL — SIGNIFICANT CHANGE UP (ref 0–0.2)
BASOPHILS NFR BLD AUTO: 0.4 % — SIGNIFICANT CHANGE UP (ref 0–2)
BILIRUB SERPL-MCNC: 0.2 MG/DL — LOW (ref 0.4–2)
BILIRUB UR-MCNC: NEGATIVE — SIGNIFICANT CHANGE UP
BUN SERPL-MCNC: 15.4 MG/DL — SIGNIFICANT CHANGE UP (ref 8–20)
CALCIUM SERPL-MCNC: 8.2 MG/DL — LOW (ref 8.4–10.5)
CHLORIDE SERPL-SCNC: 100 MMOL/L — SIGNIFICANT CHANGE UP (ref 96–108)
CO2 SERPL-SCNC: 25 MMOL/L — SIGNIFICANT CHANGE UP (ref 22–29)
COLOR SPEC: YELLOW — SIGNIFICANT CHANGE UP
CREAT SERPL-MCNC: 0.4 MG/DL — LOW (ref 0.5–1.3)
DIFF PNL FLD: NEGATIVE — SIGNIFICANT CHANGE UP
EGFR: 110 ML/MIN/1.73M2 — SIGNIFICANT CHANGE UP
EOSINOPHIL # BLD AUTO: 0.1 K/UL — SIGNIFICANT CHANGE UP (ref 0–0.5)
EOSINOPHIL NFR BLD AUTO: 1.3 % — SIGNIFICANT CHANGE UP (ref 0–6)
GLUCOSE SERPL-MCNC: 151 MG/DL — HIGH (ref 70–99)
GLUCOSE UR QL: 250 MG/DL
HCT VFR BLD CALC: 36.4 % — LOW (ref 39–50)
HGB BLD-MCNC: 12 G/DL — LOW (ref 13–17)
IMM GRANULOCYTES NFR BLD AUTO: 0.4 % — SIGNIFICANT CHANGE UP (ref 0–0.9)
KETONES UR-MCNC: ABNORMAL
LEUKOCYTE ESTERASE UR-ACNC: NEGATIVE — SIGNIFICANT CHANGE UP
LYMPHOCYTES # BLD AUTO: 1.17 K/UL — SIGNIFICANT CHANGE UP (ref 1–3.3)
LYMPHOCYTES # BLD AUTO: 14.6 % — SIGNIFICANT CHANGE UP (ref 13–44)
MCHC RBC-ENTMCNC: 29 PG — SIGNIFICANT CHANGE UP (ref 27–34)
MCHC RBC-ENTMCNC: 33 GM/DL — SIGNIFICANT CHANGE UP (ref 32–36)
MCV RBC AUTO: 87.9 FL — SIGNIFICANT CHANGE UP (ref 80–100)
MONOCYTES # BLD AUTO: 0.72 K/UL — SIGNIFICANT CHANGE UP (ref 0–0.9)
MONOCYTES NFR BLD AUTO: 9 % — SIGNIFICANT CHANGE UP (ref 2–14)
NEUTROPHILS # BLD AUTO: 5.95 K/UL — SIGNIFICANT CHANGE UP (ref 1.8–7.4)
NEUTROPHILS NFR BLD AUTO: 74.3 % — SIGNIFICANT CHANGE UP (ref 43–77)
NITRITE UR-MCNC: NEGATIVE — SIGNIFICANT CHANGE UP
PH UR: 6 — SIGNIFICANT CHANGE UP (ref 5–8)
PLATELET # BLD AUTO: 340 K/UL — SIGNIFICANT CHANGE UP (ref 150–400)
POTASSIUM SERPL-MCNC: 4.3 MMOL/L — SIGNIFICANT CHANGE UP (ref 3.5–5.3)
POTASSIUM SERPL-SCNC: 4.3 MMOL/L — SIGNIFICANT CHANGE UP (ref 3.5–5.3)
PROT SERPL-MCNC: 6.1 G/DL — LOW (ref 6.6–8.7)
PROT UR-MCNC: NEGATIVE — SIGNIFICANT CHANGE UP
RBC # BLD: 4.14 M/UL — LOW (ref 4.2–5.8)
RBC # FLD: 14.5 % — SIGNIFICANT CHANGE UP (ref 10.3–14.5)
SODIUM SERPL-SCNC: 135 MMOL/L — SIGNIFICANT CHANGE UP (ref 135–145)
SP GR SPEC: 1.02 — SIGNIFICANT CHANGE UP (ref 1.01–1.02)
UROBILINOGEN FLD QL: 1 MG/DL
WBC # BLD: 8 K/UL — SIGNIFICANT CHANGE UP (ref 3.8–10.5)
WBC # FLD AUTO: 8 K/UL — SIGNIFICANT CHANGE UP (ref 3.8–10.5)

## 2022-12-26 PROCEDURE — 72131 CT LUMBAR SPINE W/O DYE: CPT | Mod: 26,ME

## 2022-12-26 PROCEDURE — G1004: CPT

## 2022-12-26 PROCEDURE — 99284 EMERGENCY DEPT VISIT MOD MDM: CPT

## 2022-12-26 PROCEDURE — 72125 CT NECK SPINE W/O DYE: CPT | Mod: 26,ME

## 2022-12-26 NOTE — ED PROVIDER NOTE - PROGRESS NOTE DETAILS
Nam: Pt received in signout from Dr. Casas. Cleared by neurosurgery. Will discharge with leg bag and referral to urology. Will arrange for ambulance home.
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
## 2022-12-26 NOTE — ED PROVIDER NOTE - CARE PROVIDERS DIRECT ADDRESSES
[email or web address]

## 2022-12-26 NOTE — ED ADULT NURSE NOTE - OBJECTIVE STATEMENT
81 yo M came to the ED c/o urinary retention.  Pt was cath in the ED 16 fr.  Pt denies any pain at this time

## 2022-12-26 NOTE — CONSULT NOTE ADULT - ASSESSMENT
83 yo male s/p a C3-5 Cervical laminectomy by  on 12/12 who represents with urinary retention that started POD#11    Plan:  Case discussed with Dr. Talley attending on call, given the patients reported improvement in motor function and stable clinical exam, urinary retention unlikely to be secondary to an acute spinal pathology. CT Scans reassuring for any acute fractures or dislocations.     Recommend medical management for urinary retention and urology consult.    Patient scheduled for post operative follow up in clinic with Dr. Sanches and should keep appointment.,

## 2022-12-26 NOTE — ED PROVIDER NOTE - OBJECTIVE STATEMENT
82 year old male with PMH HTN, CAD, neuropathy, multiple TIA, spinal stenosis and post-op cervical laminectomy 12/12/22 who presents with urinary retention. {t states that he has been having a difficult time with urination for 4 days. He states that he feels the urge to urinate, and has to strain to produce a small amount of urine. He denies fevers, chills, hematuria, vomiting. He reports baseline weakness in his b/l legs for which he is non-ambulatory, but denies and new or worsening weakness or numbness.

## 2022-12-26 NOTE — ED PROVIDER NOTE - NSFOLLOWUPINSTRUCTIONS_ED_ALL_ED_FT
- You will need to keep May catheter in place until you follow up with urologist.  - Return to the emergency room for any new or worsening issues.    ===================================    Acute Urinary Retention, Male       Acute urinary retention is a condition in which a person is unable to pass urine or can only pass a little urine. This condition can happen suddenly and last for a short time. If left untreated, it can become long-term (chronic) and result in kidney damage or other serious complications.    What are the causes?    This condition may be caused by:  •Obstruction or narrowing of the tube that drains the bladder (urethra). This may be caused by surgery, problems with nearby organs, or injury to the bladder or urethra.      •Problems with the nerves in the bladder.      •Tumors in the area of the pelvis, bladder, or urethra.      •Certain medicines.      •Bladder or urinary tract infection.      •Constipation.    What increases the risk?    This condition is more likely to develop in older men. As men age, their prostate may become larger and may start to press or squeeze on the bladder or the urethra. Other chronic health conditions can increase the risk of acute urinary retention. These include:  •Diseases such as multiple sclerosis.      •Spinal cord injuries.      •Diabetes.      •Degenerative cognitive conditions, such as delirium or dementia.      •Psychological conditions. A man may hold his urine due to trauma or because he does not want to use the bathroom.        What are the signs or symptoms?    Symptoms of this condition include:  •Trouble urinating.      •Pain in the lower abdomen.        How is this diagnosed?    This condition is diagnosed based on a physical exam and your medical history. You may also have other tests, including:  •An ultrasound of the bladder or kidneys or both.      •Blood tests.      •A urine analysis.      •Additional tests may be needed, such as a CT scan, MRI, and kidney or bladder function tests.        How is this treated?    Treatment for this condition may include:  •Medicines.      •Placing a thin, sterile tube (catheter) into the bladder to drain urine out of the body. This is called an indwelling urinary catheter. After it is inserted, the catheter is held in place with a small balloon that is filled with sterile water. Urine drains from the catheter into a collection bag outside of the body.      •Behavioral therapy.      •Treatment for other conditions.      If needed, you may be treated in the hospital for kidney function problems or to manage other complications.      Follow these instructions at home:    Medicines     •Take over-the-counter and prescription medicines only as told by your health care provider. Avoid certain medicines, such as decongestants, antihistamines, and some prescription medicines. Do not take any medicine unless your health care provider approves.      •If you were prescribed an antibiotic medicine, take it as told by your health care provider. Do not stop using the antibiotic even if you start to feel better.      General instructions     • Do not use any products that contain nicotine or tobacco. These products include cigarettes, chewing tobacco, and vaping devices, such as e-cigarettes. If you need help quitting, ask your health care provider.      •Drink enough fluid to keep your urine pale yellow.      •If you have an indwelling urinary catheter, follow the instructions from your health care provider.      •Monitor any changes in your symptoms. Tell your health care provider about any changes.      •If instructed, monitor your blood pressure at home. Report changes as told by your health care provider.      •Keep all follow-up visits. This is important.        Contact a health care provider if:    •You have uncomfortable bladder contractions that you cannot control (spasms).      •You leak urine with the spasms.        Get help right away if:    •You have chills or a fever.      •You have blood in your urine.    •You have a catheter and the following happens:  •Your catheter stops draining urine.      •Your catheter falls out.          Summary    •Acute urinary retention is a condition in which a person is unable to pass urine or can only pass a little urine. If left untreated, this condition can result in kidney damage or other serious complications.      •An enlarged prostate may cause this condition. As men age, their prostate gland may become larger and may press or squeeze on the bladder or the urethra.      •Treatment for this condition may include medicines and placement of an indwelling urinary catheter.      •Monitor any changes in your symptoms. Tell your health care provider about any changes.      This information is not intended to replace advice given to you by your health care provider. Make sure you discuss any questions you have with your health care provider.

## 2022-12-26 NOTE — ED ADULT NURSE REASSESSMENT NOTE - NS ED NURSE REASSESS COMMENT FT1
assumed care from JOSELINE Retana. pt is an 81 y/o/m w/pmhx HTN, CAD, neuropathy, multiple TIA, spinal stenosis and post-op cervical laminectomy 12/12/22 who presents with difficulty urinating x4 days and an episode of bowel incontinence last night states that he feels the urge to urinate but cant. urinary catheter was placed. pt denies fevers, chills, nausea, pain, vomiting. c/o weakness in b/l legs x 1.5 yrs for which he has been unable to walk. denies and new or worsening weakness, tingling or numbness in b/l LE.

## 2022-12-26 NOTE — ED ADULT TRIAGE NOTE - CHIEF COMPLAINT QUOTE
patient c/o urinary retention since last friday when he had Laminectomy surgery. denies BPH problems.

## 2022-12-26 NOTE — CONSULT NOTE ADULT - NSCONSULTADDITIONALINFOA_GEN_ALL_CORE
Neurosurgery Attending Attestation:    Patient seen and examined at bedside by PA team. Agree with plan and note as documented above.

## 2022-12-26 NOTE — ED PROVIDER NOTE - CLINICAL SUMMARY MEDICAL DECISION MAKING FREE TEXT BOX
Pt urinary retention I suspect to be due to urologic issue, most liekly BPH. he has no sensory or motor deficits to the LE and the clinical exama nd imaging findings not consistent with cauda equina, stable for dc

## 2022-12-26 NOTE — ED PROVIDER NOTE - CARE PROVIDER_API CALL
Sancho Lopez)  Urology  [address] [address] [place] [address]  Phone: [phone]  Fax: [phone]  Follow Up Time:

## 2022-12-26 NOTE — ED PROVIDER NOTE - PATIENT PORTAL LINK FT
You can access the FollowMyHealth Patient Portal offered by [facility] by registering at the following website: [email or web address]. By joining [email or web address]’s FollowMyHealth portal, you will also be able to view your health information using other applications (apps) compatible with our system.

## 2022-12-27 ENCOUNTER — APPOINTMENT (OUTPATIENT)
Dept: NEUROSURGERY | Facility: CLINIC | Age: 82
End: 2022-12-27
Payer: MEDICARE

## 2022-12-27 VITALS
HEIGHT: 71 IN | HEART RATE: 65 BPM | WEIGHT: 175 LBS | OXYGEN SATURATION: 96 % | TEMPERATURE: 98.1 F | BODY MASS INDEX: 24.5 KG/M2 | DIASTOLIC BLOOD PRESSURE: 83 MMHG | SYSTOLIC BLOOD PRESSURE: 146 MMHG

## 2022-12-27 DIAGNOSIS — M48.02 SPINAL STENOSIS, CERVICAL REGION: ICD-10-CM

## 2022-12-27 PROCEDURE — 99284 EMERGENCY DEPT VISIT MOD MDM: CPT | Mod: 25

## 2022-12-27 PROCEDURE — 81003 URINALYSIS AUTO W/O SCOPE: CPT

## 2022-12-27 PROCEDURE — 80053 COMPREHEN METABOLIC PANEL: CPT

## 2022-12-27 PROCEDURE — 72131 CT LUMBAR SPINE W/O DYE: CPT | Mod: ME

## 2022-12-27 PROCEDURE — G1004: CPT

## 2022-12-27 PROCEDURE — 36415 COLL VENOUS BLD VENIPUNCTURE: CPT

## 2022-12-27 PROCEDURE — 85025 COMPLETE CBC W/AUTO DIFF WBC: CPT

## 2022-12-27 PROCEDURE — 72125 CT NECK SPINE W/O DYE: CPT | Mod: ME

## 2022-12-27 PROCEDURE — 99213 OFFICE O/P EST LOW 20 MIN: CPT | Mod: 24

## 2022-12-27 NOTE — REASON FOR VISIT
[de-identified] : Bilateral decompressive laminectomy at the level of C3, C4 and C5. [de-identified] : 12/12/22

## 2022-12-27 NOTE — ED ADULT NURSE REASSESSMENT NOTE - NS ED NURSE REASSESS COMMENT FT1
leg bag placed and pt verbalized understanding of dc instructions. iv removed. dc to home with ambulanz

## 2022-12-27 NOTE — PHYSICAL EXAM
[General Appearance - Alert] : alert [General Appearance - In No Acute Distress] : in no acute distress [Clean] : clean [Dry] : dry [Healing Well] : healing well [Intact] : intact [Sutures Intact] : closed with intact sutures [No Drainage] : without drainage [Normal Skin] : normal [Oriented To Time, Place, And Person] : oriented to person, place, and time [Impaired Insight] : insight and judgment were intact [Motor Strength] : muscle strength was normal in all four extremities [Sensation Tactile Decrease] : light touch was intact [Sensation Pain / Temperature Decrease] : pain and temperature was intact [Limited Balance] : the patient's balance was impaired [No Visual Abnormalities] : no visible abnormailities [No Tenderness to Palpation] : no spine tenderness on palpation [Sclera] : the sclera and conjunctiva were normal [] : no respiratory distress [Involuntary Movements] : no involuntary movements were seen [Motor Tone] : muscle strength and tone were normal [FreeTextEntry8] : presents in wheelchair

## 2022-12-27 NOTE — DATA REVIEWED
[de-identified] : EXAM: CT CERVICAL SPINE\par \par PROCEDURE DATE: 12/26/2022\par \par \par \par INTERPRETATION: PROCEDURE INFORMATION:\par Exam: CT Cervical Spine Without Contrast\par Exam date and time: 12/26/2022 7:43 PM\par Age: 82 years old\par Clinical indication: Cervical radiculopathy, prior c-spine surgery\par \par TECHNIQUE:\par Imaging protocol: Computed tomography of the cervical spine without contrast.\par 3D rendering (Not supervised by radiologist): MIP and/or 3D reconstructed\par images were created by the technologist.\par Total images: 574\par \par COMPARISON:\par MR CERVICAL SPINE 6/3/2022 6:40 PM and CT scan dated 06/02/2022\par \par FINDINGS:\par Bones/joints: Cervical vertebral body height is preserved. Trace grade 1\par anterolisthesis at C2-C3. Alignment is otherwise significant for straightening on a degenerative basis. Posterior decompression from C3 through C5 with interval laminectomies. Multilevel degenerative disc disease and facet arthrosis. No evidence of acute fracture. Multilevel severe multilevel foraminal stenosis.\par \par Lungs: Lung apices are unremarkable.\par \par Soft tissues: Postsurgical changes in the operative bed at site\par of prior surgical decompression.\par \par IMPRESSION:\par 1. Interval posterior decompression from C3 through C5.\par 2. Postsurgical changes in the operative bed at site of surgical\par decompression.\par 3. No evidence of acute cervical spine fracture.\par

## 2022-12-27 NOTE — ASSESSMENT
[FreeTextEntry1] : Patient with above history and imaging.  Decompression from C3 through C5, no neurological deficits. Suture removed, incision clean dry and intact. He will need to begin PT to help with strengthening and walking.  \par \par \par Plan:\par f/u in 4 weeks to assess incision and progress\par Patient knows to call the office if there are any new or worsening symptoms. \par All questions and concerns answered and addressed in detail to patient's complete satisfaction. Patient verbalized understanding and agreed to plan.\par \par

## 2022-12-27 NOTE — HISTORY OF PRESENT ILLNESS
[FreeTextEntry1] : GREGORY BONILLA is a 82 year old male who presents for post op neurosurgical evaluation of Bilateral decompressive laminectomy at the level of C3, C4 and C5 done on 12/12/22.\par Hospital Course: \par Discharge Date	16-Dec-2022 \par Admission Date	04-Dec-2022\par 82ym PMH HTN, HLD, DM, CAD admitted on 12/4 from [facility] with hx of cervical stenosis and hx of lumbar fusion. Pt is admitted with unsteady gait which was attributed to cervical stenosis. Pt was initially scheduled for cervical decompression on 12/5/22 which was delayed as Pt has been taking asa 81 mg and had to be stopped prior to surgery. Pt. underwent C3-5 laminectomy on 12/12. Pt. tolerated procedure well. \par Currently he presents in a wheelchair. He states he feels a better and has not started trying to walk as of yet. PT is supposed to come to his house next week. He denies numbness/tingling of arms, fever, chills or drainage from incison. \par

## 2023-01-04 NOTE — DISCHARGE NOTE NURSING/CASE MANAGEMENT/SOCIAL WORK - NURSING SECTION COMPLETE
-- DO NOT REPLY / DO NOT REPLY ALL --  -- Message is from Engagement Center Operations (ECO) --    ONLY TO BE USED WITHIN A REFILL MEDICATION ENCOUNTER    Med Refill  Is the patient currently having any symptoms?: No/Non-Emergent symptoms    Name of medication requested: See pended med    Has patient contacted the pharmacy? No, direct patient to contact pharmacy first as they will be able to process the refill request directly    Is this the first request for the medication in the last 48 hours?: Yes      Patient is requesting a medication refill - medication is on active list      Full name of the provider who ordered the medication: rashid rosenberg [facility] site name / Account # for provider: [id number]    Preferred Pharmacy: Pharmacy  [id number] [place] [address] [facility] [place]    Patient confirmed the above pharmacy as correct?  Yes      Caller Information       Type Contact Phone/Fax    01/04/2023 02:57 PM CST Phone (Incoming) Bradley Romero (Self) [phone] (M)          Alternative phone number: [phone]    Can a detailed message be left?: Yes    Patient is completely out of medication: Verify if patient is currently experiencing symptoms. If patient is symptomatic, proceed with front end triage instead of medication refill. If patient is not symptomatic but is completely out of medication, dasha as High priority when routing. Inform patient: “Please call back with any questions or concerns and if your condition becomes life threatening, you should seek immediate medical assistance by calling 911 or going to the Emergency Department for evaluation.”    Inform all patients: \"If the clinical team needs to contact you regarding this refill, please be aware the return phone call may come from an unidentified or out of state phone number and your refill request will be addressed as soon as the clinical team reviews your message.\"   Patient/Caregiver provided printed discharge information.

## 2023-03-17 NOTE — DISCHARGE NOTE NURSING/CASE MANAGEMENT/SOCIAL WORK - NURSING SECTION COMPLETE
Previously Declined (within the last year)
Patient/Caregiver provided printed discharge information.

## 2023-05-28 NOTE — PROGRESS NOTE ADULT - PROVIDER SPECIALTY LIST ADULT
Discharge instructions reviewed with patient. Patient received 1 time dose of Potassium. Patient informed to  Rx meds at local pharmacy. All personal belongings accounted for.   
Neurosurgery
Hospitalist
Neurosurgery

## 2023-06-06 NOTE — SWALLOW BEDSIDE ASSESSMENT ADULT - CONSISTENCIES ADMINISTERED
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 Broussard    Cardiology Karel Broussard MD         Current Assessment & Plan     Continue ASA and statin. Follow up with cardiology.             Renal/    Benign prostatic hyperplasia without lower urinary tract symptoms    Overview     Chronic. Intermittent control. On Flomax and Finasteride. Following with urology. He would like a referral to another urologist for a second opinion. External referral placed per request.     PSA Total (ng/mL)   Date Value   12/02/2020 0.39     PSA, Free (ng/mL)   Date Value   12/02/2020 0.24     PSA, Free % (%)   Date Value   12/02/2020 61.54            Renal cyst, right    Overview     Noted on recent CT- 1.5 cm spherical fluid density area in the upper pole of the right kidney compatible with a benign cyst. Continue to monitor. Following with urology.             Orthopedic    S/P ORIF (open reduction internal fixation) fracture    Overview     Oct 2021            Other    Encounter to discuss test results - Primary    Overview     Here to discuss recent CT results. Reviewed results below in detail. Patient plans to follow up with GI next week. No acute complaints. Denies any other questions, problems, or concerns.    CT Abdomen Pelvis W Contrast  Narrative  REASON FOR EXAM: [R10.84]-Generalized abdominal pain / R10.84     TECHNICAL FACTORS: Multiple contiguous axial CT images were obtained of the abdomen and pelvis after administration of intravenous contrast. 2D reformatted images were performed. Automated exposure control was utilized for radiation dose reduction.     COMPARISON: 01/30/2023     CT ABDOMEN:   Lower chest: Coronary artery bypass. Lung  bases appear clear.   Soft tissues/Musculature: Unremarkable   Osseous Structures: Unremarkable   Abdominal Aorta/Vasculature: There is moderate calcified atherosclerotic plaque formation of the abdominal aorta. Celiac superior and inferior mesenteric arteries are patent. There is plaque formation at the origin of the right renal artery possible   flow-limiting stenosis.   Lymph nodes/Mesentery: Unremarkable   Liver: Unremarkable   Gallbladder/Biliary System: There is questionable punctate high density in the dependent area of the gallbladder (2-25). No biliary duct system dilatation.   Pancreas: Unremarkable   Spleen: Unremarkable   Adrenal glands: Unremarkable   Kidneys/Ureters: There is a 1.5 cm spherical fluid density area in the upper pole of the right kidney compatible with a benign cyst.   Bowel/Stomach: There is a moderate left diaphragmatic hernia which contains a segment of the stomach.   Appendix: Not definitively visualized. No secondary signs of acute appendicitis.   Free fluid/gas: None     CT PELVIS:   Soft tissues/Musculature: Unremarkable   Osseous Structures: Post left hip fracture ORIF.   Vasculature: Unremarkable   Lymph Nodes: Unremarkable   Distal ureters/Urinary bladder: The bladder displays generalized reticulation, solid formation and multiple small diverticula.   Reproductive organs: Unremarkable   Bowel: Unremarkable   Free fluid/gas: None     IMPRESSION:    1.  No acute finding.    2.  Possible cholelithiasis.   3. Other incidental findings as above.      Electronically signed by Sergey Lara MD on 5/24/2023 3:32 PM          Follow up if symptoms worsen or fail to improve.  ER precautions for severe or worsening symptoms.     Roxanne Merino NP  _____________________________________________________________________________________________________________________________________________________    CC: discuss results     HPI: Patient is a 89-year-old male who presents in clinic today as  an established patient here to discuss recent CT results. Reviewed results below in detail. Patient plans to follow up with GI next week. Chronic condition has been reviewed and remains stable. Further details as stated above.     Past Medical History:  Past Medical History:   Diagnosis Date    Anticoagulant long-term use     Arthritis     Basal cell carcinoma     Cancer     Coronary artery disease     CABG X 2 APPROX 6 YEAR    Heart disease     Hyperlipidemia     Squamous cell carcinoma of skin      Past Surgical History:   Procedure Laterality Date    ABDOMINAL SURGERY      APPENDECTOMY      CARDIAC SURGERY      cathx3 with stent placed    CARDIAC VALVE REPLACEMENT      CORONARY ARTERY BYPASS GRAFT      EPIDURAL STEROID INJECTION INTO LUMBAR SPINE N/A 06/04/2020    Procedure: Injection-steroid-epidural-lumbar L5/S1;  Surgeon: Davie Holder MD;  Location: [facility] OR;  Service: Pain Management;  Laterality: N/A;    EYE SURGERY      FOOT SURGERY      FRACTURE SURGERY      HERNIA REPAIR      HIP SURGERY Left     [facility]     JOINT REPLACEMENT      KNEE SURGERY      PROSTATE SURGERY      SKIN CANCER EXCISION      TONSILLECTOMY      TRANSFORAMINAL EPIDURAL INJECTION OF STEROID Left 02/05/2020    Procedure: Injection,steroid,epidural,transforaminal approach L4/5 and L5/S1;  Surgeon: Davie Holder MD;  Location: [facility] OR;  Service: Pain Management;  Laterality: Left;    TRANSFORAMINAL EPIDURAL INJECTION OF STEROID Left 05/12/2020    Procedure: Injection,steroid,epidural,transforaminal approach L4/5 and L5/S1;  Surgeon: Davie Holder MD;  Location: [facility] OR;  Service: Pain Management;  Laterality: Left;     Review of patient's allergies indicates:   Allergen Reactions    Bactrim [sulfamethoxazole-trimethoprim]     Dulera [mometasone-formoterol]     Gabapentin Edema     Causes body to retain fluids    Imiquimod     Lyrica [pregabalin]     Minocycline     Oxybutynin Hives     Headache and stomach problems     Sulfamethoxazole     Cephalexin Rash    Clindamycin Rash    Doxycycline Rash     Social History     Tobacco Use    Smoking status: Never    Smokeless tobacco: Never   Substance Use Topics    Alcohol use: Never    Drug use: Never     Family History   Problem Relation Age of Onset    Stroke Maternal Grandmother     Cancer Maternal Grandfather     Cancer Paternal Grandmother     Diabetes Brother     Stroke Mother      Current Outpatient Medications on File Prior to Visit   Medication Sig Dispense Refill    aspirin (ECOTRIN) 81 MG EC tablet Take 1 tablet (81 mg total) by mouth once daily. 30 tablet 11    atorvastatin (LIPITOR) 10 MG tablet TAKE 1 TABLET BY MOUTH IN  THE EVENING 90 tablet 3    azelastine (ASTELIN) 137 mcg (0.1 %) nasal spray 1 spray (137 mcg total) by Nasal route 2 (two) times daily. 90 mL 1    calcium carbonate/vitamin D3 (CALCIUM WITH VITAMIN D3 ORAL) Take by mouth 2 (two) times daily.      docusate sodium (COLACE) 100 MG capsule Take 1 capsule (100 mg total) by mouth 2 (two) times daily. 90 capsule 1    docusate sodium (COLACE) 100 MG capsule Take 1 capsule (100 mg total) by mouth 2 (two) times daily. 180 capsule 4    famotidine (PEPCID) 40 MG tablet Take 40 mg by mouth every evening.      ferrous gluconate (FERGON) 324 MG tablet Take 1 tablet (324 mg total) by mouth every other day.      finasteride (PROSCAR) 5 mg tablet Take 5 mg by mouth.      fluorouraciL (EFUDEX) 5 % cream Apply 1 application topically 2 (two) times daily.      HYDROcodone-acetaminophen (NORCO) 5-325 mg per tablet Take 1 tablet by mouth every 6 (six) hours as needed.      levothyroxine (SYNTHROID) 75 MCG tablet TAKE 1 TABLET BY MOUTH ONCE DAILY 90 tablet 3    loratadine (CLARITIN) 10 mg tablet Take 1 tablet (10 mg total) by mouth once daily. 30 tablet 11    losartan (COZAAR) 25 MG tablet TAKE 1 TABLET BY MOUTH ONCE DAILY 90 tablet 3    mupirocin (BACTROBAN) 2 % ointment Apply topically 3 (three) times daily. 22 g 0     "nitrofurantoin (MACRODANTIN) 100 MG capsule Take 100 mg by mouth 2 (two) times daily.      nitroGLYCERIN (NITROSTAT) 0.4 MG SL tablet Place 1 tablet (0.4 mg total) under the tongue as needed (Chest pain). Can repeat every 5 minutes to a total of 3 tablets. Go to ER for persistent chest pain. 25 tablet 11    pantoprazole (PROTONIX) 40 MG tablet TAKE 1 TABLET BY MOUTH ONCE DAILY 90 tablet 3    peg 400-propylene glycol (SYSTANE ULTRA) 0.4-0.3 % Drop Apply to eye.      tamsulosin (FLOMAX) 0.4 mg Cap TAKE 1 CAPSULE BY MOUTH  ONCE DAILY 90 capsule 3    vitamin B comp and C no.3 (B COMPLEX PLUS VITAMIN C) 15-10-50-5-300 mg Cap Take 1 tablet by mouth once daily.       No current facility-administered medications on file prior to visit.     Review of Systems   Constitutional:  Negative for chills, fatigue, fever and unexpected weight change.   HENT:  Negative for ear pain and sore throat.    Eyes:  Negative for redness and visual disturbance.   Respiratory:  Negative for cough and shortness of breath.    Cardiovascular:  Negative for chest pain and palpitations.   Gastrointestinal:  Positive for abdominal pain (cramping). Negative for constipation, diarrhea, nausea and vomiting.   Endocrine: Negative for cold intolerance and heat intolerance.   Genitourinary:  Negative for difficulty urinating and hematuria.   Musculoskeletal:  Positive for arthralgias. Negative for myalgias.   Skin:  Negative for rash and wound.   Allergic/Immunologic: Negative for environmental allergies and food allergies.   Neurological:  Negative for weakness and headaches.   Hematological:  Negative for adenopathy. Does not bruise/bleed easily.   Psychiatric/Behavioral:  Negative for sleep disturbance. The patient is not nervous/anxious.      Vitals:    06/06/23 1311   BP: 124/78   BP Location: Right arm   Pulse: 86   SpO2: 98%   Weight: 75.3 kg (166 lb)   Height: 5' 10" (1.778 m)     Wt Readings from Last 3 Encounters:   06/06/23 75.3 kg (166 lb) "   04/26/23 76.6 kg (168 lb 14.4 oz)   04/11/23 76.7 kg (169 lb)     Physical Exam  Constitutional:       General: He is not in acute distress.     Appearance: Normal appearance. He is well-developed. He is not diaphoretic.   HENT:      Head: Normocephalic and atraumatic.      Right Ear: There is no impacted cerumen.      Left Ear: There is no impacted cerumen.   Eyes:      Extraocular Movements: Extraocular movements intact.      Conjunctiva/sclera: Conjunctivae normal.      Pupils: Pupils are equal, round, and reactive to light.   Cardiovascular:      Rate and Rhythm: Normal rate and regular rhythm.      Pulses: Normal pulses.      Heart sounds: Normal heart sounds. No murmur heard.  Pulmonary:      Effort: Pulmonary effort is normal. No respiratory distress.      Breath sounds: Normal breath sounds.   Abdominal:      General: Bowel sounds are normal. There is no distension.      Palpations: Abdomen is soft.      Tenderness: There is no abdominal tenderness.   Musculoskeletal:         General: Normal range of motion.      Cervical back: Normal range of motion and neck supple.   Skin:     General: Skin is warm and dry.      Findings: No rash.   Neurological:      General: No focal deficit present.      Mental Status: He is alert and oriented to person, place, and time.      Cranial Nerves: No cranial nerve deficit.      Motor: Weakness (generalized) present.      Gait: Gait abnormal (using walker).   Psychiatric:         Attention and Perception: He is attentive.         Mood and Affect: Mood is not anxious or depressed. Affect is not labile, blunt, angry or inappropriate.         Speech: He is communicative. Speech is not rapid and pressured, delayed, slurred or tangential.         Behavior: Behavior is not agitated, slowed, aggressive, withdrawn, hyperactive or combative.         Thought Content: Thought content does not include homicidal or suicidal ideation.     Health Maintenance   Topic Date Due    Lipid Panel   08/09/2027    TETANUS VACCINE  08/30/2029        puree thin liquid soft solid

## 2023-09-07 NOTE — PATIENT PROFILE ADULT - NS PRO AD ANY ON CHART
No Cartilage Graft Text: Because of the laxity of the alar rim resulting from loss of fibrofatty support, and to preserve form and function of the nose, a cartilage graft was planned.  An incision was made into the conchal bowl and a cutaneous flap was elevated. The conchal bowl cartilage was excised and after hemostasis was obtained, the cutaneous flap was replaced and sutured down.  The cartilage graft was then carved to fit the defect.  Two pockets were made medially and laterally in the alar rim, and the cartilage strut was sutured into place with Vicryl suture.

## 2023-09-11 ENCOUNTER — EMERGENCY (EMERGENCY)
Facility: HOSPITAL | Age: 83
LOS: 1 days | Discharge: HOME | End: 2023-09-11
Attending: EMERGENCY MEDICINE
Payer: MEDICARE

## 2023-09-11 VITALS
WEIGHT: 169.98 LBS | OXYGEN SATURATION: 99 % | DIASTOLIC BLOOD PRESSURE: 93 MMHG | HEART RATE: 88 BPM | RESPIRATION RATE: 16 BRPM | HEIGHT: 71 IN | SYSTOLIC BLOOD PRESSURE: 142 MMHG | TEMPERATURE: 97 F

## 2023-09-11 DIAGNOSIS — Z95.1 PRESENCE OF AORTOCORONARY BYPASS GRAFT: Chronic | ICD-10-CM

## 2023-09-11 LAB
ALBUMIN SERPL ELPH-MCNC: 4 G/DL — SIGNIFICANT CHANGE UP (ref 3.3–5.2)
ALP SERPL-CCNC: 63 U/L — SIGNIFICANT CHANGE UP (ref 40–120)
ALT FLD-CCNC: 14 U/L — SIGNIFICANT CHANGE UP
ANION GAP SERPL CALC-SCNC: 15 MMOL/L — SIGNIFICANT CHANGE UP (ref 5–17)
AST SERPL-CCNC: 20 U/L — SIGNIFICANT CHANGE UP
BASOPHILS # BLD AUTO: 0.01 K/UL — SIGNIFICANT CHANGE UP (ref 0–0.2)
BASOPHILS NFR BLD AUTO: 0.2 % — SIGNIFICANT CHANGE UP (ref 0–2)
BILIRUB SERPL-MCNC: 0.4 MG/DL — SIGNIFICANT CHANGE UP (ref 0.4–2)
BUN SERPL-MCNC: 12.6 MG/DL — SIGNIFICANT CHANGE UP (ref 8–20)
CALCIUM SERPL-MCNC: 8.7 MG/DL — SIGNIFICANT CHANGE UP (ref 8.4–10.5)
CHLORIDE SERPL-SCNC: 93 MMOL/L — LOW (ref 96–108)
CO2 SERPL-SCNC: 24 MMOL/L — SIGNIFICANT CHANGE UP (ref 22–29)
CREAT SERPL-MCNC: 0.62 MG/DL — SIGNIFICANT CHANGE UP (ref 0.5–1.3)
EGFR: 95 ML/MIN/1.73M2 — SIGNIFICANT CHANGE UP
EOSINOPHIL # BLD AUTO: 0 K/UL — SIGNIFICANT CHANGE UP (ref 0–0.5)
EOSINOPHIL NFR BLD AUTO: 0 % — SIGNIFICANT CHANGE UP (ref 0–6)
FLUAV AG NPH QL: SIGNIFICANT CHANGE UP
FLUBV AG NPH QL: SIGNIFICANT CHANGE UP
GLUCOSE SERPL-MCNC: 109 MG/DL — HIGH (ref 70–99)
HCT VFR BLD CALC: 38.1 % — LOW (ref 39–50)
HGB BLD-MCNC: 12.8 G/DL — LOW (ref 13–17)
IMM GRANULOCYTES NFR BLD AUTO: 0.7 % — SIGNIFICANT CHANGE UP (ref 0–0.9)
LYMPHOCYTES # BLD AUTO: 0.65 K/UL — LOW (ref 1–3.3)
LYMPHOCYTES # BLD AUTO: 11.8 % — LOW (ref 13–44)
MCHC RBC-ENTMCNC: 29.6 PG — SIGNIFICANT CHANGE UP (ref 27–34)
MCHC RBC-ENTMCNC: 33.6 GM/DL — SIGNIFICANT CHANGE UP (ref 32–36)
MCV RBC AUTO: 88.2 FL — SIGNIFICANT CHANGE UP (ref 80–100)
MONOCYTES # BLD AUTO: 0.33 K/UL — SIGNIFICANT CHANGE UP (ref 0–0.9)
MONOCYTES NFR BLD AUTO: 6 % — SIGNIFICANT CHANGE UP (ref 2–14)
NEUTROPHILS # BLD AUTO: 4.49 K/UL — SIGNIFICANT CHANGE UP (ref 1.8–7.4)
NEUTROPHILS NFR BLD AUTO: 81.3 % — HIGH (ref 43–77)
PLATELET # BLD AUTO: 230 K/UL — SIGNIFICANT CHANGE UP (ref 150–400)
POTASSIUM SERPL-MCNC: 4.1 MMOL/L — SIGNIFICANT CHANGE UP (ref 3.5–5.3)
POTASSIUM SERPL-SCNC: 4.1 MMOL/L — SIGNIFICANT CHANGE UP (ref 3.5–5.3)
PROT SERPL-MCNC: 6.9 G/DL — SIGNIFICANT CHANGE UP (ref 6.6–8.7)
RBC # BLD: 4.32 M/UL — SIGNIFICANT CHANGE UP (ref 4.2–5.8)
RBC # FLD: 15.8 % — HIGH (ref 10.3–14.5)
RSV RNA NPH QL NAA+NON-PROBE: SIGNIFICANT CHANGE UP
S PYO DNA THROAT QL NAA+PROBE: SIGNIFICANT CHANGE UP
SARS-COV-2 RNA SPEC QL NAA+PROBE: SIGNIFICANT CHANGE UP
SODIUM SERPL-SCNC: 132 MMOL/L — LOW (ref 135–145)
TROPONIN T SERPL-MCNC: <0.01 NG/ML — SIGNIFICANT CHANGE UP (ref 0–0.06)
TROPONIN T SERPL-MCNC: <0.01 NG/ML — SIGNIFICANT CHANGE UP (ref 0–0.06)
WBC # BLD: 5.52 K/UL — SIGNIFICANT CHANGE UP (ref 3.8–10.5)
WBC # FLD AUTO: 5.52 K/UL — SIGNIFICANT CHANGE UP (ref 3.8–10.5)

## 2023-09-11 PROCEDURE — 70360 X-RAY EXAM OF NECK: CPT | Mod: 26

## 2023-09-11 PROCEDURE — 93010 ELECTROCARDIOGRAM REPORT: CPT

## 2023-09-11 PROCEDURE — 71046 X-RAY EXAM CHEST 2 VIEWS: CPT | Mod: 26

## 2023-09-11 PROCEDURE — 99223 1ST HOSP IP/OBS HIGH 75: CPT | Mod: FS

## 2023-09-11 RX ORDER — ATORVASTATIN CALCIUM 80 MG/1
80 TABLET, FILM COATED ORAL AT BEDTIME
Refills: 0 | Status: DISCONTINUED | OUTPATIENT
Start: 2023-09-11 | End: 2023-09-19

## 2023-09-11 RX ORDER — ACETAMINOPHEN 500 MG
975 TABLET ORAL ONCE
Refills: 0 | Status: COMPLETED | OUTPATIENT
Start: 2023-09-11 | End: 2023-09-11

## 2023-09-11 RX ORDER — GABAPENTIN 400 MG/1
300 CAPSULE ORAL THREE TIMES A DAY
Refills: 0 | Status: DISCONTINUED | OUTPATIENT
Start: 2023-09-11 | End: 2023-09-19

## 2023-09-11 RX ORDER — PANTOPRAZOLE SODIUM 20 MG/1
40 TABLET, DELAYED RELEASE ORAL
Refills: 0 | Status: DISCONTINUED | OUTPATIENT
Start: 2023-09-11 | End: 2023-09-19

## 2023-09-11 RX ORDER — TAMSULOSIN HYDROCHLORIDE 0.4 MG/1
0.4 CAPSULE ORAL AT BEDTIME
Refills: 0 | Status: DISCONTINUED | OUTPATIENT
Start: 2023-09-11 | End: 2023-09-19

## 2023-09-11 RX ORDER — TAMSULOSIN HYDROCHLORIDE 0.4 MG/1
1 CAPSULE ORAL
Refills: 0 | DISCHARGE

## 2023-09-11 RX ORDER — LISINOPRIL 2.5 MG/1
20 TABLET ORAL DAILY
Refills: 0 | Status: DISCONTINUED | OUTPATIENT
Start: 2023-09-11 | End: 2023-09-19

## 2023-09-11 RX ORDER — LIDOCAINE 4 G/100G
5 CREAM TOPICAL ONCE
Refills: 0 | Status: COMPLETED | OUTPATIENT
Start: 2023-09-11 | End: 2023-09-11

## 2023-09-11 RX ORDER — DEXAMETHASONE 0.5 MG/5ML
10 ELIXIR ORAL ONCE
Refills: 0 | Status: COMPLETED | OUTPATIENT
Start: 2023-09-11 | End: 2023-09-11

## 2023-09-11 RX ORDER — METFORMIN HYDROCHLORIDE 850 MG/1
1000 TABLET ORAL
Refills: 0 | Status: DISCONTINUED | OUTPATIENT
Start: 2023-09-11 | End: 2023-09-19

## 2023-09-11 RX ORDER — ATENOLOL 25 MG/1
50 TABLET ORAL DAILY
Refills: 0 | Status: DISCONTINUED | OUTPATIENT
Start: 2023-09-11 | End: 2023-09-19

## 2023-09-11 RX ORDER — KETOROLAC TROMETHAMINE 30 MG/ML
15 SYRINGE (ML) INJECTION ONCE
Refills: 0 | Status: DISCONTINUED | OUTPATIENT
Start: 2023-09-11 | End: 2023-09-11

## 2023-09-11 RX ORDER — ASPIRIN/CALCIUM CARB/MAGNESIUM 324 MG
1 TABLET ORAL
Refills: 0 | DISCHARGE

## 2023-09-11 RX ORDER — AMLODIPINE BESYLATE 2.5 MG/1
5 TABLET ORAL DAILY
Refills: 0 | Status: DISCONTINUED | OUTPATIENT
Start: 2023-09-11 | End: 2023-09-19

## 2023-09-11 RX ORDER — DULOXETINE HYDROCHLORIDE 30 MG/1
20 CAPSULE, DELAYED RELEASE ORAL DAILY
Refills: 0 | Status: DISCONTINUED | OUTPATIENT
Start: 2023-09-11 | End: 2023-09-19

## 2023-09-11 RX ORDER — ASPIRIN/CALCIUM CARB/MAGNESIUM 324 MG
81 TABLET ORAL DAILY
Refills: 0 | Status: DISCONTINUED | OUTPATIENT
Start: 2023-09-11 | End: 2023-09-19

## 2023-09-11 RX ORDER — METFORMIN HYDROCHLORIDE 850 MG/1
1 TABLET ORAL
Refills: 0 | DISCHARGE

## 2023-09-11 RX ADMIN — Medication 102 MILLIGRAM(S): at 18:53

## 2023-09-11 RX ADMIN — Medication 975 MILLIGRAM(S): at 18:53

## 2023-09-11 RX ADMIN — LIDOCAINE 5 MILLILITER(S): 4 CREAM TOPICAL at 18:53

## 2023-09-11 RX ADMIN — Medication 15 MILLIGRAM(S): at 18:53

## 2023-09-11 NOTE — ED STATDOCS - PROGRESS NOTE DETAILS
Evangelina LATHAM for ED attending, Dr. Hopson. 82 y/o male with pmhx of CAD, CABG s/p 3 stents, cervical neuropathy s/p C3-C5 fusion and HTN, c/o intermitted chest pressure/heaviness, neck pain, painful swallowing, fever Tmax 100.9, cough and horse voice for 3 days. Pt states tolerating secretion. On exam has uvula swelling and erythema. no uvula deviation. Pt will be placed in the main ED for complete evaluation by another provider. Evangelina LATHAM for ED attending, Dr. Hopson. 82 y/o male with pmhx of CAD, CABG s/p 3 stents, cervical neuropathy s/p C3-C5 fusion and HTN, c/o intermitted chest pressure/heaviness, neck pain, painful swallowing, fever Tmax 100.9, cough and horse voice for 3 days. Pt states tolerating secretion. On exam has uvula swelling and erythema. no uvula deviation. COVID and strep ordered.  Retriaged to main ED for monitoring and further work-up of chest pressure.

## 2023-09-11 NOTE — ED ADULT NURSE NOTE - OBJECTIVE STATEMENT
82 y/o male with pmhx of CAD, CABG s/p 3 stents, cervical neuropathy s/p C3-C5 fusion and HTN, c/o intermitted chest pressure/heaviness, neck pain, painful swallowing, fever Tmax 100.9, cough and horse voice for 3 days. Pt states tolerating secretion.

## 2023-09-11 NOTE — ED CDU PROVIDER INITIAL DAY NOTE - OBJECTIVE STATEMENT
83 year old male with HTN, TIA, CAD, CABG presents with chest pain and sore throat. The pt reports that he has had 3 days of intermittent chest pressure. He denies radiation, no alleviating/exacerbating factors. No associated SOB, LE edema. In addition, the pt reports that he has had 2 days of worsening nasal congestion, cough, sore throat, and a fever at home. He states he was unable to eat lunch today due to the pain. No hoarse voice or SOB.

## 2023-09-11 NOTE — ED CDU PROVIDER INITIAL DAY NOTE - ATTENDING APP SHARED VISIT CONTRIBUTION OF CARE
83 year old male with HTN, TIA, CAD, CABG presents with chest pain and sore throat. The pt reports that he has had 3 days of intermittent chest pressure. He denies radiation, no alleviating/exacerbating factors. No associated SOB, LE edema. In addition, the pt reports that he has had 2 days of worsening nasal congestion, cough, sore throat, and a fever at home. He states he was unable to eat lunch today due to the pain. No hoarse voice or SOB.    workup in ED neg. pt feeling improved. will observe on tele overnight for cards eval in AM

## 2023-09-11 NOTE — ED CDU PROVIDER INITIAL DAY NOTE - WR ORDER DATE AND TIME 2
[facility]  [address] [place] [address] [phone]      June 19, 2017      Abdiaziz Kimball  [address] [facility] [address]        Dear Employer:    This patient was seen in clinic for workman's comp. Injury to the left pinkie finger.  Skin abrasion with area of shredded flap. Wound dressing was placed.   Needs to stay covered and dry. Work as tolerated.   Tetanus was up to date.    Recheck wound check as needed.    Report any fever or red streaking up arm.               Sincerely,    Lela Mejia MD, MEd             11-Sep-2023 18:22

## 2023-09-11 NOTE — ED ADULT TRIAGE NOTE - CHIEF COMPLAINT QUOTE
Pt c/o difficulty swallowing secondary to pain that started 2 days ago. Pt spouse said he had a fever at home this morning. Has a chronic postnasal drip. No Tylenol or Motrin taken, no fever here.

## 2023-09-11 NOTE — ED PROVIDER NOTE - CLINICAL SUMMARY MEDICAL DECISION MAKING FREE TEXT BOX
pt with mild erythema to posterior pharynx and mild uvula edema. Tolerating PO, no stridor or hoarse voice and the pt states his painful swallowing has resolved following meds here. However, pt also c/o chest pain, intermediate risk, placed on obs

## 2023-09-11 NOTE — ED CDU PROVIDER INITIAL DAY NOTE - CONSTITUTIONAL, MLM
Well appearing, awake, alert, oriented to person, place, time/situation and in no apparent distress. normal... Otezla Counseling: The side effects of Otezla were discussed with the patient, including but not limited to worsening or new depression, weight loss, diarrhea, nausea, upper respiratory tract infection, and headache. Patient instructed to call the office should any adverse effect occur.  The patient verbalized understanding of the proper use and possible adverse effects of Otezla.  All the patient's questions and concerns were addressed.

## 2023-09-11 NOTE — ED PROVIDER NOTE - OBJECTIVE STATEMENT
83 year old male with HTN, TIA, CAD presents with chest pain and sore throat. The pt reports that he has had 3 days of intermittent chest pressure. He denies radiation, no alleviating/exacerbating factors. No associated SOB, LE edema. In addition, the pt reports that he has had 2 days of worsening nasal congestion, cough, sore throat, and a fever at home. He states he was unable to eat lunch today due to the pain. No hoarse voice or SOB.

## 2023-09-11 NOTE — ED CDU PROVIDER INITIAL DAY NOTE - CLINICAL SUMMARY MEDICAL DECISION MAKING FREE TEXT BOX
83 year old male with HTN, TIA, CAD, CABG presents with intermittent chest pain x 3 days, also with sore throat cough congestion /URI symptoms. Covid/flu negative. Sx improving with meds. Given hx CABG/CAD with intermittent chest apin placed on observation for serial cardiac enzymes, telemetry monitoring, and cardiology consult in AM

## 2023-09-12 VITALS
OXYGEN SATURATION: 96 % | RESPIRATION RATE: 16 BRPM | HEART RATE: 59 BPM | DIASTOLIC BLOOD PRESSURE: 77 MMHG | TEMPERATURE: 98 F | SYSTOLIC BLOOD PRESSURE: 126 MMHG

## 2023-09-12 LAB — TROPONIN T SERPL-MCNC: <0.01 NG/ML — SIGNIFICANT CHANGE UP (ref 0–0.06)

## 2023-09-12 PROCEDURE — G0378: CPT

## 2023-09-12 PROCEDURE — 70491 CT SOFT TISSUE NECK W/DYE: CPT | Mod: MA

## 2023-09-12 PROCEDURE — 87798 DETECT AGENT NOS DNA AMP: CPT

## 2023-09-12 PROCEDURE — 70360 X-RAY EXAM OF NECK: CPT

## 2023-09-12 PROCEDURE — 87637 SARSCOV2&INF A&B&RSV AMP PRB: CPT

## 2023-09-12 PROCEDURE — 96375 TX/PRO/DX INJ NEW DRUG ADDON: CPT

## 2023-09-12 PROCEDURE — 36415 COLL VENOUS BLD VENIPUNCTURE: CPT

## 2023-09-12 PROCEDURE — 99239 HOSP IP/OBS DSCHRG MGMT >30: CPT

## 2023-09-12 PROCEDURE — 84484 ASSAY OF TROPONIN QUANT: CPT

## 2023-09-12 PROCEDURE — 93005 ELECTROCARDIOGRAM TRACING: CPT

## 2023-09-12 PROCEDURE — 85025 COMPLETE CBC W/AUTO DIFF WBC: CPT

## 2023-09-12 PROCEDURE — 87651 STREP A DNA AMP PROBE: CPT

## 2023-09-12 PROCEDURE — 99285 EMERGENCY DEPT VISIT HI MDM: CPT | Mod: 25

## 2023-09-12 PROCEDURE — 80053 COMPREHEN METABOLIC PANEL: CPT

## 2023-09-12 PROCEDURE — 71046 X-RAY EXAM CHEST 2 VIEWS: CPT

## 2023-09-12 PROCEDURE — 70491 CT SOFT TISSUE NECK W/DYE: CPT | Mod: 26,MA

## 2023-09-12 PROCEDURE — 96374 THER/PROPH/DIAG INJ IV PUSH: CPT

## 2023-09-12 RX ADMIN — GABAPENTIN 300 MILLIGRAM(S): 400 CAPSULE ORAL at 05:12

## 2023-09-12 RX ADMIN — ATENOLOL 50 MILLIGRAM(S): 25 TABLET ORAL at 12:04

## 2023-09-12 RX ADMIN — AMLODIPINE BESYLATE 5 MILLIGRAM(S): 2.5 TABLET ORAL at 05:11

## 2023-09-12 RX ADMIN — DULOXETINE HYDROCHLORIDE 20 MILLIGRAM(S): 30 CAPSULE, DELAYED RELEASE ORAL at 12:04

## 2023-09-12 RX ADMIN — Medication 81 MILLIGRAM(S): at 12:03

## 2023-09-12 RX ADMIN — LISINOPRIL 20 MILLIGRAM(S): 2.5 TABLET ORAL at 05:11

## 2023-09-12 RX ADMIN — PANTOPRAZOLE SODIUM 40 MILLIGRAM(S): 20 TABLET, DELAYED RELEASE ORAL at 05:12

## 2023-09-12 NOTE — ED ADULT NURSE REASSESSMENT NOTE - COMFORT CARE
plan of care explained/repositioned/treatment delay explained/wait time explained/warm blanket provided
side rails up

## 2023-09-12 NOTE — ED ADULT NURSE REASSESSMENT NOTE - NS ED NURSE REASSESS COMMENT FT1
pt stable pending bed assignment at this time denies chest pain sob fall and safety precautions in place
Pt received in the stretcher resting comfortably , no distress noted, no chest pain reported , breathing easy and unlabored - pt awaiting CT and US. VSS - will continue to monitor
C/p pt assumed from Rosalva RN @0015. no S&S of acute distress, pt resting comfortably on stretcher. pt denies CP/pressure/tightness, palpitations, SOB/dyspnea, dizziness/headache/lightheadedness/blurry vision, tingling/numbness, fevers/chills, N/V/D, urinary S&S. CM+ maintained. pt is able to make needs known. call bell in reach and encouraged to use when assistance is needed.  awaiting cardiology consult. plan of care reviewed with pt. pt in understanding of plan of care.
pt received from Go ECKERT pt stable a&o x4 pending xray results at this time. Fall and safety precautions in place

## 2023-09-12 NOTE — ED CDU PROVIDER DISPOSITION NOTE - ATTENDING APP SHARED VISIT CONTRIBUTION OF CARE
pt cleared for discharge  will follow up with Dr White tomorrow, any time after ten a.m in office  Will follow up with ENT  RX steroids for vocal cord thickening/inflammation  agree w dispo

## 2023-09-12 NOTE — ED CDU PROVIDER DISPOSITION NOTE - CARE PROVIDER_API CALL
Gerald Orantes  Otolaryngology  [address] [place] [address], [facility] [address]  [place] [address]  Phone: [phone]  Fax: [phone]  Follow Up Time:

## 2023-09-12 NOTE — ED CDU PROVIDER DISPOSITION NOTE - NSFOLLOWUPINSTRUCTIONS_ED_ALL_ED_FT
Chest Pain    Chest pain can be caused by many different conditions which may or may not be dangerous. Causes include heartburn, lung infections, heart attack, blood clot in lungs, skin infections, strain or damage to muscle, cartilage, or bones, etc. In addition to a history and physical examination, an electrocardiogram (ECG) or other lab tests may have been performed to determine the cause of your chest pain. Follow up with your primary care provider or with a cardiologist as instructed.     SEEK IMMEDIATE MEDICAL CARE IF YOU HAVE ANY OF THE FOLLOWING SYMPTOMS: worsening chest pain, coughing up blood, unexplained back/neck/jaw pain, severe abdominal pain, dizziness or lightheadedness, fainting, shortness of breath, sweaty or clammy skin, vomiting, or racing heart beat. These symptoms may represent a serious problem that is an emergency. Do not wait to see if the symptoms will go away. Get medical help right away. Call 911 and do not drive yourself to the hospital.    Please follow up with ENT and cardiology    Take medications as prescribed    Return if symptoms persist or worsen

## 2023-09-12 NOTE — ED CDU PROVIDER SUBSEQUENT DAY NOTE - CLINICAL SUMMARY MEDICAL DECISION MAKING FREE TEXT BOX
83 year old male with HTN, TIA, CAD, CABG presents with intermittent chest pain x 3 days, also with sore throat cough congestion /URI symptoms. Covid/flu negative. Sx improving with meds. Given hx CABG/CAD with intermittent chest pain placed on observation for serial cardiac enzymes, telemetry monitoring, and cardiology consult in AM

## 2023-09-12 NOTE — CHART NOTE - NSCHARTNOTEFT_GEN_A_CORE
Patient has an appointment scheduled with Dr. Orantes on 9/15/23 at 9:00Am  ENT  [address] [place] [address]  Phone: [phone]

## 2023-09-12 NOTE — ED CDU PROVIDER DISPOSITION NOTE - PATIENT PORTAL LINK FT
You can access the FollowMyHealth Patient Portal offered by [facility] by registering at the following website: [email or web address]. By joining [facility]’s FollowMyHealth portal, you will also be able to view your health information using other applications (apps) compatible with our system.
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
## 2023-09-12 NOTE — ED CDU PROVIDER DISPOSITION NOTE - NS ED ATTENDING STATEMENT MOD
This was a shared visit with the ROSCOE. I reviewed and verified the documentation and independently performed the documented: Attending with

## 2023-09-12 NOTE — ED CDU PROVIDER SUBSEQUENT DAY NOTE - ATTENDING APP SHARED VISIT CONTRIBUTION OF CARE
Pt in OBSERVATION for CP protocol  + risk factors  three negative trops. Also had some difficulty swallowing as per pt.  seen by cardio while in observation and cleared for dc  I spoke with Dr Delfin White his cardiologist [facility] in [place]    CT neck some vocal cord thickening.   Plan is fu Dr White tomorrowwwww  any time after ten a.m. in office, and rx steroids. ENT outpt follow up as well  Pt to dc if trops negative ( they are negative x 3) and he will see him in the office tomorrow

## 2023-09-12 NOTE — ED ADULT NURSE REASSESSMENT NOTE - NSFALLRISKINTERV_ED_ALL_ED

## 2023-09-12 NOTE — ED CDU PROVIDER DISPOSITION NOTE - CLINICAL COURSE
83 year old male with PMHx of CAD s/p 3v CABG and multiple PCIs, CVA s/p ILR, HTN who presents with dysphagia and sore throat now resolved.  Patient had CT soft tissue neck + vocal cord inflammation, given steroids with relief of symptoms.  Tolerating diet.  Airway intact.  Will refer to ENT.  RX prednisone.  Seen by cardiology, cleared, and contacted primary cardiologist MD White, advises if three serial trops negative, can follow up in office after 10am tomorrow.  Given copies of all results.  Understands and agrees to proceed.

## 2023-09-15 ENCOUNTER — APPOINTMENT (OUTPATIENT)
Dept: OTOLARYNGOLOGY | Facility: CLINIC | Age: 83
End: 2023-09-15

## 2024-01-04 NOTE — OCCUPATIONAL THERAPY INITIAL EVALUATION ADULT - FINE MOTOR COORDINATION, LEFT HAND, FINGER TO NOSE, OT EVAL
Quality 130: Documentation Of Current Medications In The Medical Record: Current Medications Documented Detail Level: Detailed Quality 110: Preventive Care And Screening: Influenza Immunization: Influenza immunization was not ordered or administered, reason not given Quality 226: Preventive Care And Screening: Tobacco Use: Screening And Cessation Intervention: Tobacco Screening not Performed normal performance

## 2024-02-21 NOTE — PROVIDER CONTACT NOTE (OTHER) - SITUATION
this PT attempted to perform a sit to stand transfer with patient from chair, patient's bilateral lower extremities gave out, this PT was unable to get safely back into chair, so this PT safely Price (Do Not Change): 0.00 Instructions: This plan will send the code FBSE to the PM system.  DO NOT or CHANGE the price. Detail Level: Simple

## 2024-04-07 NOTE — DISCHARGE NOTE PROVIDER - NSDCQMPCI_CARD_ALL_CORE
SS Note/ Discharge plan:  Consult noted for d/c planning, pt admitted with a hip fx from a fall at work, met with pt and pt's daughter Shiv Edwards explaining sw role and consult, reviewed rehab options and therapy recommendations for home with home therapy vs [facility] placement, pt currently a&o and pleasant, pt and dtr concerned about her going directly home and prefer arrangements be made for a temporary SNF placement at [facility], referral made to [facility] admission liaison and awaiting chart review and acceptance however facility will need a physician signed C-9 form for workman's comp prior to pt being admitted, cm and nursing informed, sw will follow to confirm d/c plan and transfer arrangements. Electronically signed by RAMON Burdick on 3/8/2018 at 2:10 PM SS Note/ Discharge plan:  Notified by [facility] admissions liaison for [facility] that their corporate office has denied pt for admission, notified pt and reviewed rehab options for home vs alternative SNF placement, pt does not feel she will be able to go directly home, pt says most of her family live in [place] but pt not receptive to going to [place], [place] list reviewed and pt prefers to go to either [facility] or to [facility] [address] referral made both admission liaisons and face sheet faxed, awaiting for chart review and acceptance, per cm C-9 form initiated and faxed physician's office and will need to be signed by physician and submitted to workman's comp for [facility] approval, sw will continue to follow to assist with transfer arrangements, nursing informed.  Electronically signed by RAMON Capellan on 3/9/2018 at 10:22 AM SS Note/ Discharge plan:  Notified by Kael Alves admissions for [facility] that they are not able to consider pt for acute rehab admission until a C-9 and claim number are issued and if pt's therapy notes are recommending [facility] placement pt probably will not qualify or acute rehab placement, notified by Carla Puri admissions liaison for [facility] that their facility has accepted pt medically and they have a private room available on their rehab unit to accept pt and are submitted for PRECERT with  today, HENS exemption completed, cm and nursing notified, pt informed and agreeable to plan. Electronically signed by RAMON Littlejohn on 3/9/2018 at 11:55 AM Signed C-9 form for Worker's Comp placed in soft chart.   Electronically signed by Vy Matthews RN on 3/13/2018 at 11:54 AM Spoke with Nicholas Benitez at [facility] for worker's comp requesting a claim number. She states that no number has been assigned as yet and anticipates that it will be assigned tomorrow. Spoke with Prema Lynn at Dr. Bennett Chandra office. She needs claim number in order to process the C-9. Will call her when it is available.   Electronically signed by Fransisca Clements RN on 3/12/2018 at 1:42 PM No 1 or 2

## 2024-05-16 NOTE — DISCHARGE NOTE PROVIDER - NSDCCPGOAL_GEN_ALL_CORE_FT
To get better and follow your care plan as instructed. [FreeTextEntry1] : follow up after laser for KAREN 2

## 2024-11-20 NOTE — DISCHARGE NOTE PROVIDER - PROVIDER RX CONTACT NUMBER
Due to progressive disease/stable  Now stable on MS Contin 30mg BID  Hydromorphone IR 4mg Q3H PRN  Narcan RX given and explained use to pt and family. Pt and family verbalized understanding.    Miralax 1pack/day or Senna 2 tabs at bedtime for opioid-induced constipation    Pain contract- 09/04/2024  UDS- 10/17/2024- Consistent w/ prescribed Hydromorphone and Morphine. Inconsistent with Marijuana. Pt aware she can obtain medical marijuana card.    [phone]

## 2024-12-10 NOTE — ED PROVIDER NOTE - WR ORDER NAME 2
Spoke to pt over the phone. She would like orders placed for both UPPER and LOWER GI scopes. Pt states she has received both of these procedures on 2/16/2017 for colon cancer screening and dysphagia.    Xray Chest 2 Views PA/Lat

## 2025-01-08 NOTE — PROGRESS NOTE ADULT - SUBJECTIVE AND OBJECTIVE BOX
House Supervisor Aware      INTERVAL HPI/OVERNIGHT EVENTS:  81y Male PMH HTN, HLD, DM2, CAD s/p CABG, chronic back pain with history of laminectomy and fusion, presented to ED due to inability to ambulate, found with T10-T11 disc herniation with cord compression, now s/p T10-T11 laminectomy and discectomy POD#3. Patient seen earlier this AM, feels his lower extremity strength is greatly improved.    Vital Signs Last 24 Hrs  T(C): 36.6 (01 Dec 2021 08:00), Max: 36.9 (01 Dec 2021 00:14)  T(F): 97.8 (01 Dec 2021 08:00), Max: 98.4 (01 Dec 2021 00:14)  HR: 54 (01 Dec 2021 08:00) (49 - 66)  BP: 164/77 (01 Dec 2021 08:00) (139/66 - 164/77)  BP(mean): --  RR: 18 (01 Dec 2021 08:00) (17 - 18)  SpO2: 97% (01 Dec 2021 08:00) (96% - 98%)    PHYSICAL EXAM:  NOTE INCOMPLETE    LABS:                        10.0   9.30  )-----------( 251      ( 30 Nov 2021 05:47 )             29.8     11-30    138  |  102  |  18.4  ----------------------------<  168<H>  4.3   |  24.0  |  0.54    Ca    7.9<L>      30 Nov 2021 05:47  Phos  2.7     11-30  Mg     1.9     11-30 11-30 @ 07:01  -  12-01 @ 07:00  --------------------------------------------------------  IN: 2660 mL / OUT: 2550 mL / NET: 110 mL INTERVAL HPI/OVERNIGHT EVENTS:  81y Male PMH HTN, HLD, DM2, CAD s/p CABG, chronic back pain with history of laminectomy and fusion, presented to ED due to inability to ambulate, found with T10-T11 disc herniation with cord compression, now s/p T10-T11 laminectomy and discectomy POD#3. Patient seen earlier this AM, feels his lower extremity strength is greatly improved.    Vital Signs Last 24 Hrs  T(C): 36.6 (01 Dec 2021 08:00), Max: 36.9 (01 Dec 2021 00:14)  T(F): 97.8 (01 Dec 2021 08:00), Max: 98.4 (01 Dec 2021 00:14)  HR: 54 (01 Dec 2021 08:00) (49 - 66)  BP: 164/77 (01 Dec 2021 08:00) (139/66 - 164/77)  BP(mean): --  RR: 18 (01 Dec 2021 08:00) (17 - 18)  SpO2: 97% (01 Dec 2021 08:00) (96% - 98%)    PHYSICAL EXAM:  GEN: NAD  HEAD: Atraumatic normocephalic  MENTAL STATUS: Awake, alert, oriented x 3. Appropriately conversant. Following commands  CRANIAL NERVES: PERRL. EOMI. Face symmetric. Hearing intact. Speech clear  MOTOR: B/l HF 5/5, LLE 4-/5 otherwise 5/5; RLE 5/5  SENSATION: Grossly intact to light touch  ABD: Soft, nontender  PULM: Nonlabored breathing, no respiratory distress  EXTREMITIES: NOntender to palpation    LABS:                        10.0   9.30  )-----------( 251      ( 30 Nov 2021 05:47 )             29.8     11-30    138  |  102  |  18.4  ----------------------------<  168<H>  4.3   |  24.0  |  0.54    Ca    7.9<L>      30 Nov 2021 05:47  Phos  2.7     11-30  Mg     1.9     11-30 11-30 @ 07:01  -  12-01 @ 07:00  --------------------------------------------------------  IN: 2660 mL / OUT: 2550 mL / NET: 110 mL

## 2025-02-03 NOTE — ED ADULT NURSE REASSESSMENT NOTE - ED CARDIAC RHYTHM
PATIENT INFORMATION      Follow-Up    If symptoms are not improving in next 2-3 days or if new symptoms arise.    Additional Educational Resources:  For additional resources regarding your symptoms, diagnosis, or further health information, please visit the Health Resources section on [email or web address] or the Online Health Resources section in [facility].      
regular
regular

## 2025-02-09 NOTE — ED ADULT TRIAGE NOTE - NSWEIGHTCALCTOOLDRUG_GEN_A_CORE
02/09/25 1206   Vital Signs   Temp 97.9 °F (36.6 °C)   Temp Source Oral   Pulse 65   Heart Rate Source Monitor   Respirations 18   /66   MAP (Calculated) 79   BP Location Right upper arm   BP Method Automatic   Patient Position Semi fowlers   Oxygen Therapy   SpO2 94 %   O2 Device None (Room air)     Vitals and reassessment completed. No significant changes. No further needs at this time.     Cindy Valencia RN      used

## 2025-02-24 ENCOUNTER — APPOINTMENT (OUTPATIENT)
Dept: RHEUMATOLOGY | Facility: CLINIC | Age: 85
End: 2025-02-24

## 2025-04-30 NOTE — PATIENT PROFILE ADULT - NSPROPTRIGHTNOTIFY_GEN_A_NUR
Patient: Johnie Buenrostro    Procedure: Procedure(s):  RIGHT MINI-THORACOTOMY, ON CARDIOPULMONARY BYPASS, MINIMALLY INVASIVE MITRAL VALVE REPAIR (ANNULOPLASTY RING AMI CAT PHYSIO II 32MM), TRANESOPHAGEAL ECHOCARDIOGRAM (ARMANDO) BY ANESTHESIA.       Diagnosis: Mitral regurgitation [I34.0]  Diagnosis Additional Information: No value filed.    Anesthesia Type:   General     Note:    Oropharynx: ventilatory support and endotracheal tube in place  Level of Consciousness: iatrogenic sedation      Independent Airway: airway patency not satisfactory and stable  Dentition: dentition unchanged  Vital Signs Stable: post-procedure vital signs reviewed and stable  Report to RN Given: handoff report given  Patient transferred to: ICU    ICU Handoff: Call for PAUSE to initiate/utilize ICU HANDOFF, Identified Patient, Identified Responsible Provider, Reviewed the Pertinent Medical History, Discussed Surgical Course, Reviewed Intra-OP Anesthesia Management and Issues during Anesthesia, Set Expectations for Post Procedure Period and Allowed Opportunity for Questions and Acknowledgement of Understanding      Vitals:  Vitals Value Taken Time   BP     Temp     Pulse 89 04/30/25 1254   Resp 18 04/30/25 1254   SpO2 98 % 04/30/25 1258   Vitals shown include unfiled device data.    Electronically Signed By: Johnie Kelley MD  April 30, 2025  12:59 PM   declines

## 2025-04-30 NOTE — ED PROVIDER NOTE - GASTROINTESTINAL, MLM
----- Message from ALMA Boykin sent at 4/28/2025 11:03 AM CDT -----  Patient was in the ER this AM:   Report sent to GI:  The patient is a 37-year-old male presenting for chest pain and headache:   He reports experiencing chest pain, the cause of which remains unknown to him. He also describes a sensation of rib pain as if they are being compressed. He has been monitoring his blood pressure at home, which was recorded as 138/98. On Monday, he noted an elevated reading of 176/101, which caused him significant distress. An EGD performed a few months prior did not reveal any abnormalities. He recalls that the medication prescribed during his last visit provided some relief.     He has been experiencing vertigo, characterized by a sensation of room spinning and a feeling of bobbing even when stationary, for approximately one week. This symptom tends to worsen as he approaches bedtime. He reports no tinnitus but mentions a history of tinnitus from his previous occupation as a , which he left three years ago. The severity of his dizziness appears to have increased recently, coinciding with the onset of his chest pain.  He also reports experiencing panic attacks and high levels of stress. He continues to drive despite these symptoms. It is reported that his symptoms tend to exacerbate following particularly stressful events. He has been under significant job-related stress for the past month, which has led to recurrent panic attacks, vertigo, and a sensation of his house being tilted.  Additionally, he has been experiencing severe stomach pain. He is not currently taking any antacid medications but recalls that a previously prescribed medication was beneficial. He was scheduled for an ultrasound in six months but did not attend due to an improvement in his condition.    EGD on 10/27/2023 with Dr. More:  Esophagus:   The Z-line was measured at 35 cm, GEJ at 35 cm and hiatus at 35 cm from the incisors.    Regular Z line, no signs of esophagitis.      Stomach:  Normal     Duodenum/small bowel:   Normal     Estimated Blood Loss:   None     Interventions:   None     Complications: no     Impression:   1. Normal upper endoscopy. No signs of esophagitis as clinically questioned.       Recommendations:   · Change timing to pantoprazole to pre-prandial.   · If symptoms of acid reflux not controlled increase to BID    Patient reported to the ER that the Protonix did help relieve his GI symptoms in the past. He was on Protonix 40 mg daily.    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 The family made a decision to treatment Holistically and he got better. He has not had follow up on the hep B since he was 17. He does not drink alcohol regularly.     Hep B and liver evaluation labs noted:   We spoke to Dr. Johnson yesterday about results of acute hepatitis panel results:   Component  Ref Range & Units   Hepatitis A Antibody, IgM  Negative Negative   Hepatitis A Interpretation    Comment: No evidence of acute Hepatitis A infection  Hepatitis B Surface Antigen  Negative Positive Abnormal    Hepatitis B Core Antibody, IgM  Negative Negative   Hepatitis B Interpretation    Comment: Hepatitis B, chronic infection or carrier state.  Patient infectious.  Hepatitis C Antibody  Negative Negative   Hepatitis B Core Antibody, IgG And IgM  Negative Positive Abnormal         Hep B viral DNA quantitative noted:   Component  Ref Range & Units    Hepatitis B (HBV), Quantitative  Not Detected Not Detected   Hepatitis B (HBV), Log  Not Detected Not Detected     6/25/2024:[facility] lab: Hep B virus DNA: IU/ml/log/IU/ml Not detected!    Recent LFTS noted:  Component  4/28/2025:Ref Range & Units (pia) 02:51  (4/28/25)   10 mo ago  (6/25/24) 1 yr ago  (10/23/23) 1 yr  ago  (8/20/23) 4 yr ago  (12/21/20)  Albumin 3.8 4.4 R 4.0 R 3.7 R 4.1 R  Bilirubin, Total 0.3 0.5 R 0.8 0.4 0.4  Bilirubin, Direct 0.1 0.1 R 0.2 0.1   Alkaline Phosphatase 85 69 R 95 85 81  GPT/ALT 44 36 R 55 41 38  GOT/AST 23 21 R 24 24 21  Protein, Total 7.5 7.2 R 7.7 7.8 7.9    Lipase elevated at:   Component  Ref Range & Units (hover) 02:51 1 yr ago  Lipase 82 High  59 CM    Last CT done in 2023 noted:  IMPRESSION:   No acute findings in the abdomen and pelvis.     From his previous GI visit he did share with us he was having a lot of stress:  He told us: He tells us again he has a lot of Stress issues related to his Job. He is a  and he is under a lot stress driving. He is out on the road a lot and that plays a role with his diet and bowels.   Appears he is still having a lot of stress that could be attributing to his GI issues.     Can we please call him to see how he is doing and follow up with him in GI as necessary?  I also included Dr. More on this conversation as he did the EGD for any recommendations.     Thank you Germán ADRIENNE.   Abdomen soft, non-tender, no guarding.

## 2025-06-11 ENCOUNTER — INPATIENT (INPATIENT)
Facility: HOSPITAL | Age: 85
LOS: 0 days | Discharge: HOME | DRG: 310 | End: 2025-06-12
Attending: STUDENT IN AN ORGANIZED HEALTH CARE EDUCATION/TRAINING PROGRAM | Admitting: INTERNAL MEDICINE
Payer: MEDICARE

## 2025-06-11 ENCOUNTER — RESULT REVIEW (OUTPATIENT)
Age: 85
End: 2025-06-11

## 2025-06-11 VITALS
SYSTOLIC BLOOD PRESSURE: 157 MMHG | OXYGEN SATURATION: 93 % | RESPIRATION RATE: 18 BRPM | HEIGHT: 71 IN | HEART RATE: 50 BPM | DIASTOLIC BLOOD PRESSURE: 77 MMHG | TEMPERATURE: 98 F | WEIGHT: 169.98 LBS

## 2025-06-11 DIAGNOSIS — Z95.1 PRESENCE OF AORTOCORONARY BYPASS GRAFT: Chronic | ICD-10-CM

## 2025-06-11 DIAGNOSIS — R00.1 BRADYCARDIA, UNSPECIFIED: ICD-10-CM

## 2025-06-11 LAB
ALBUMIN SERPL ELPH-MCNC: 3.6 G/DL — SIGNIFICANT CHANGE UP (ref 3.3–5.2)
ALP SERPL-CCNC: 55 U/L — SIGNIFICANT CHANGE UP (ref 40–120)
ALT FLD-CCNC: 20 U/L — SIGNIFICANT CHANGE UP
ANION GAP SERPL CALC-SCNC: 15 MMOL/L — SIGNIFICANT CHANGE UP (ref 5–17)
APTT BLD: 33.7 SEC — SIGNIFICANT CHANGE UP (ref 26.1–36.8)
AST SERPL-CCNC: 30 U/L — SIGNIFICANT CHANGE UP
BASOPHILS # BLD AUTO: 0.02 K/UL — SIGNIFICANT CHANGE UP (ref 0–0.2)
BASOPHILS NFR BLD AUTO: 0.3 % — SIGNIFICANT CHANGE UP (ref 0–2)
BILIRUB SERPL-MCNC: 0.3 MG/DL — LOW (ref 0.4–2)
BUN SERPL-MCNC: 19.5 MG/DL — SIGNIFICANT CHANGE UP (ref 8–20)
CALCIUM SERPL-MCNC: 8.3 MG/DL — LOW (ref 8.4–10.5)
CHLORIDE SERPL-SCNC: 99 MMOL/L — SIGNIFICANT CHANGE UP (ref 96–108)
CK SERPL-CCNC: 65 U/L — SIGNIFICANT CHANGE UP (ref 30–200)
CO2 SERPL-SCNC: 24 MMOL/L — SIGNIFICANT CHANGE UP (ref 22–29)
CREAT SERPL-MCNC: 0.62 MG/DL — SIGNIFICANT CHANGE UP (ref 0.5–1.3)
EGFR: 95 ML/MIN/1.73M2 — SIGNIFICANT CHANGE UP
EGFR: 95 ML/MIN/1.73M2 — SIGNIFICANT CHANGE UP
EOSINOPHIL # BLD AUTO: 0.06 K/UL — SIGNIFICANT CHANGE UP (ref 0–0.5)
EOSINOPHIL NFR BLD AUTO: 1 % — SIGNIFICANT CHANGE UP (ref 0–6)
GLUCOSE BLDC GLUCOMTR-MCNC: 109 MG/DL — HIGH (ref 70–99)
GLUCOSE SERPL-MCNC: 119 MG/DL — HIGH (ref 70–99)
HCT VFR BLD CALC: 35.7 % — LOW (ref 39–50)
HGB BLD-MCNC: 11.6 G/DL — LOW (ref 13–17)
IMM GRANULOCYTES # BLD AUTO: 0.02 K/UL — SIGNIFICANT CHANGE UP (ref 0–0.07)
IMM GRANULOCYTES NFR BLD AUTO: 0.3 % — SIGNIFICANT CHANGE UP (ref 0–0.9)
INR BLD: 1.06 RATIO — SIGNIFICANT CHANGE UP (ref 0.85–1.16)
LYMPHOCYTES # BLD AUTO: 1.06 K/UL — SIGNIFICANT CHANGE UP (ref 1–3.3)
LYMPHOCYTES NFR BLD AUTO: 18.5 % — SIGNIFICANT CHANGE UP (ref 13–44)
MAGNESIUM SERPL-MCNC: 1.5 MG/DL — LOW (ref 1.6–2.6)
MCHC RBC-ENTMCNC: 29.1 PG — SIGNIFICANT CHANGE UP (ref 27–34)
MCHC RBC-ENTMCNC: 32.5 G/DL — SIGNIFICANT CHANGE UP (ref 32–36)
MCV RBC AUTO: 89.7 FL — SIGNIFICANT CHANGE UP (ref 80–100)
MONOCYTES # BLD AUTO: 0.55 K/UL — SIGNIFICANT CHANGE UP (ref 0–0.9)
MONOCYTES NFR BLD AUTO: 9.6 % — SIGNIFICANT CHANGE UP (ref 2–14)
NEUTROPHILS # BLD AUTO: 4.01 K/UL — SIGNIFICANT CHANGE UP (ref 1.8–7.4)
NEUTROPHILS NFR BLD AUTO: 70.3 % — SIGNIFICANT CHANGE UP (ref 43–77)
NRBC # BLD AUTO: 0 K/UL — SIGNIFICANT CHANGE UP (ref 0–0)
NRBC # FLD: 0 K/UL — SIGNIFICANT CHANGE UP (ref 0–0)
NRBC BLD AUTO-RTO: 0 /100 WBCS — SIGNIFICANT CHANGE UP (ref 0–0)
NT-PROBNP SERPL-SCNC: 1235 PG/ML — HIGH (ref 0–300)
PLATELET # BLD AUTO: 120 K/UL — LOW (ref 150–400)
PMV BLD: 10.4 FL — SIGNIFICANT CHANGE UP (ref 7–13)
POTASSIUM SERPL-MCNC: 4.9 MMOL/L — SIGNIFICANT CHANGE UP (ref 3.5–5.3)
POTASSIUM SERPL-SCNC: 4.9 MMOL/L — SIGNIFICANT CHANGE UP (ref 3.5–5.3)
PROT SERPL-MCNC: 6.4 G/DL — LOW (ref 6.6–8.7)
PROTHROM AB SERPL-ACNC: 12.3 SEC — SIGNIFICANT CHANGE UP (ref 9.9–13.4)
RBC # BLD: 3.98 M/UL — LOW (ref 4.2–5.8)
RBC # FLD: 16.6 % — HIGH (ref 10.3–14.5)
SODIUM SERPL-SCNC: 138 MMOL/L — SIGNIFICANT CHANGE UP (ref 135–145)
TROPONIN T, HIGH SENSITIVITY RESULT: 16 NG/L — SIGNIFICANT CHANGE UP (ref 0–51)
TROPONIN T, HIGH SENSITIVITY RESULT: 18 NG/L — SIGNIFICANT CHANGE UP (ref 0–51)
TSH SERPL-MCNC: 5.15 UIU/ML — HIGH (ref 0.27–4.2)
WBC # BLD: 5.72 K/UL — SIGNIFICANT CHANGE UP (ref 3.8–10.5)
WBC # FLD AUTO: 5.72 K/UL — SIGNIFICANT CHANGE UP (ref 3.8–10.5)

## 2025-06-11 PROCEDURE — 99222 1ST HOSP IP/OBS MODERATE 55: CPT

## 2025-06-11 PROCEDURE — 93306 TTE W/DOPPLER COMPLETE: CPT | Mod: 26

## 2025-06-11 PROCEDURE — 99285 EMERGENCY DEPT VISIT HI MDM: CPT

## 2025-06-11 PROCEDURE — 71045 X-RAY EXAM CHEST 1 VIEW: CPT | Mod: 26

## 2025-06-11 PROCEDURE — 93010 ELECTROCARDIOGRAM REPORT: CPT

## 2025-06-11 RX ORDER — SODIUM CHLORIDE 9 G/1000ML
1000 INJECTION, SOLUTION INTRAVENOUS
Refills: 0 | Status: DISCONTINUED | OUTPATIENT
Start: 2025-06-11 | End: 2025-06-12

## 2025-06-11 RX ORDER — AMLODIPINE BESYLATE 10 MG/1
5 TABLET ORAL DAILY
Refills: 0 | Status: DISCONTINUED | OUTPATIENT
Start: 2025-06-11 | End: 2025-06-12

## 2025-06-11 RX ORDER — LISINOPRIL 30 MG/1
25 TABLET ORAL DAILY
Refills: 0 | Status: DISCONTINUED | OUTPATIENT
Start: 2025-06-11 | End: 2025-06-12

## 2025-06-11 RX ORDER — ASPIRIN 325 MG
81 TABLET ORAL DAILY
Refills: 0 | Status: DISCONTINUED | OUTPATIENT
Start: 2025-06-11 | End: 2025-06-12

## 2025-06-11 RX ORDER — DEXTROSE 50 % IN WATER 50 %
25 SYRINGE (ML) INTRAVENOUS ONCE
Refills: 0 | Status: DISCONTINUED | OUTPATIENT
Start: 2025-06-11 | End: 2025-06-12

## 2025-06-11 RX ORDER — ATORVASTATIN CALCIUM 80 MG/1
80 TABLET, FILM COATED ORAL AT BEDTIME
Refills: 0 | Status: DISCONTINUED | OUTPATIENT
Start: 2025-06-11 | End: 2025-06-11

## 2025-06-11 RX ORDER — INSULIN LISPRO 100 U/ML
INJECTION, SOLUTION INTRAVENOUS; SUBCUTANEOUS
Refills: 0 | Status: DISCONTINUED | OUTPATIENT
Start: 2025-06-11 | End: 2025-06-12

## 2025-06-11 RX ORDER — GLUCAGON 3 MG/1
1 POWDER NASAL ONCE
Refills: 0 | Status: DISCONTINUED | OUTPATIENT
Start: 2025-06-11 | End: 2025-06-12

## 2025-06-11 RX ORDER — MAGNESIUM SULFATE 500 MG/ML
2 SYRINGE (ML) INJECTION ONCE
Refills: 0 | Status: COMPLETED | OUTPATIENT
Start: 2025-06-11 | End: 2025-06-11

## 2025-06-11 RX ORDER — LISINOPRIL 5 MG/1
20 TABLET ORAL DAILY
Refills: 0 | Status: DISCONTINUED | OUTPATIENT
Start: 2025-06-11 | End: 2025-06-12

## 2025-06-11 RX ORDER — DULOXETINE 20 MG/1
20 CAPSULE, DELAYED RELEASE ORAL DAILY
Refills: 0 | Status: DISCONTINUED | OUTPATIENT
Start: 2025-06-11 | End: 2025-06-12

## 2025-06-11 RX ORDER — DEXTROSE 50 % IN WATER 50 %
15 SYRINGE (ML) INTRAVENOUS ONCE
Refills: 0 | Status: DISCONTINUED | OUTPATIENT
Start: 2025-06-11 | End: 2025-06-12

## 2025-06-11 RX ORDER — GABAPENTIN 400 MG/1
300 CAPSULE ORAL THREE TIMES A DAY
Refills: 0 | Status: DISCONTINUED | OUTPATIENT
Start: 2025-06-11 | End: 2025-06-12

## 2025-06-11 RX ORDER — DEXTROSE 50 % IN WATER 50 %
12.5 SYRINGE (ML) INTRAVENOUS ONCE
Refills: 0 | Status: DISCONTINUED | OUTPATIENT
Start: 2025-06-11 | End: 2025-06-12

## 2025-06-11 RX ORDER — TAMSULOSIN HYDROCHLORIDE 0.4 MG/1
0.4 CAPSULE ORAL AT BEDTIME
Refills: 0 | Status: DISCONTINUED | OUTPATIENT
Start: 2025-06-11 | End: 2025-06-12

## 2025-06-11 RX ORDER — ATORVASTATIN CALCIUM 80 MG/1
40 TABLET, FILM COATED ORAL AT BEDTIME
Refills: 0 | Status: DISCONTINUED | OUTPATIENT
Start: 2025-06-11 | End: 2025-06-12

## 2025-06-11 RX ORDER — ASPIRIN 325 MG
324 TABLET ORAL ONCE
Refills: 0 | Status: COMPLETED | OUTPATIENT
Start: 2025-06-11 | End: 2025-06-11

## 2025-06-11 RX ADMIN — TAMSULOSIN HYDROCHLORIDE 0.4 MILLIGRAM(S): 0.4 CAPSULE ORAL at 21:01

## 2025-06-11 RX ADMIN — AMLODIPINE BESYLATE 5 MILLIGRAM(S): 10 TABLET ORAL at 21:01

## 2025-06-11 RX ADMIN — GABAPENTIN 300 MILLIGRAM(S): 400 CAPSULE ORAL at 21:01

## 2025-06-11 RX ADMIN — Medication 25 GRAM(S): at 12:09

## 2025-06-11 RX ADMIN — ATORVASTATIN CALCIUM 40 MILLIGRAM(S): 80 TABLET, FILM COATED ORAL at 21:01

## 2025-06-11 NOTE — ED PROVIDER NOTE - OBJECTIVE STATEMENT
84-year-old male with history of hypertension hyperlipidemia diabetes CAD status post CABG in 2007 and stents before and after the CABG A-fib on blood thinners and had a prior loop recorder presents with chest pain for 1 day and feels weak and tired and at baseline walks with a walker due to chronic arthritis.  Patient follows up with cardiology and he feels like that the palpitations and the chest pain Coming back so he decided to come in.  No shortness of breath fever chills.

## 2025-06-11 NOTE — CONSULT NOTE ADULT - ASSESSMENT
Assessment  Chest pain ? anginal no acute ECG changes, trop neg X 2  CAD s/p remote PCI/CABG in setting of preserved LV function  Mild MR Mild AI normal EF  PAF not AC candidate, not Watchman candidate due to SNEHAL thrombus - on low dose asa  ILR in place  Remote CVA/fall risk  DM      Rec  Monitor in observation overnight  Will interrogate loop in am to assess Af rate control  cont outpt norvasc/ ACEI  lower atenolol 25 mg day  cont asa/statin  add imdur 30 mg day  TTE in am to reassess EF  IF echo unremarkable likely DC in am for outpt PET   Assessment  Chest pain ? anginal no acute ECG changes, trop neg X 2  CAD s/p remote PCI/CABG in setting of preserved LV function  Mild MR Mild AI normal EF  BNP elevated w/o evidence of CHF on CXR or exam   PAF not AC candidate, not Watchman candidate due to SNEHAL thrombus - on low dose asa  ILR in place  Remote CVA/fall risk  DM      Rec  Monitor in observation overnight  Will interrogate loop in am to assess Af rate control  cont outpt norvasc/ ACEI  lower atenolol 25 mg day  cont asa/statin  add imdur 30 mg day  TTE in am to reassess EF  IF echo unremarkable likely DC in am for outpt PET

## 2025-06-11 NOTE — PATIENT PROFILE ADULT - FALL HARM RISK - HARM RISK INTERVENTIONS

## 2025-06-11 NOTE — ED PROVIDER NOTE - PROGRESS NOTE DETAILS
Patient admitted to medicine for echo and cardiac workup.  There is no evidence of loop recorder on the chest x-ray however EP consult was called as cardiology has recommended a loop recorder interrogation or placement of a new loop recorder.

## 2025-06-11 NOTE — H&P ADULT - NSHPPHYSICALEXAM_GEN_ALL_CORE
Vital Signs Last 24 Hrs  T(C): 36.3 (06-11-25 @ 15:45), Max: 36.6 (06-11-25 @ 09:29)  T(F): 97.4 (06-11-25 @ 15:45), Max: 97.8 (06-11-25 @ 09:29)  HR: 49 (06-11-25 @ 15:45) (48 - 50)  BP: 153/83 (06-11-25 @ 15:45) (153/83 - 160/66)  BP(mean): --  RR: 18 (06-11-25 @ 15:45) (18 - 18)  SpO2: 98% (06-11-25 @ 15:45) (93% - 98%)    General: Appears alert and cooperative.  HEENT: Head; normocephalic, atraumatic.  Eyes: Pupils reactive, cornea wnl.  Neck: Supple, no nodes adenopathy, no NVD or carotid bruit or thyromegaly.  CARDIOVASCULAR: Normal S1 and S2, No murmur, rub, gallop or lift.   LUNGS: No rales, rhonchi or wheeze. Normal breath sounds bilaterally.  ABDOMEN: Soft, nontender without mass or organomegaly. bowel sounds normoactive.  EXTREMITIES: No clubbing, cyanosis or edema. Distal pulses wnl.   SKIN: warm and dry with normal turgor.  NEURO: Alert/oriented x 3/normal motor exam. No pathologic reflexes.    PSYCH: normal affect.

## 2025-06-11 NOTE — ED ADULT NURSE NOTE - NSFALLRISKINTERV_ED_ALL_ED

## 2025-06-11 NOTE — ED PROVIDER NOTE - MUSCULOSKELETAL, MLM
Spine appears normal, range of motion is not limited, no muscle or joint tenderness, chronic arthropathy

## 2025-06-11 NOTE — H&P ADULT - ASSESSMENT
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  Pt stated he had expereinced CP at rest intermittently all day yest then recurred at rest this am. No radiation, and no particular aggravating, inciting or relieving factors, cannot say what brings it on. It is precordial and intermittent. Denies palpitations, n/v, diaphoresis, light headedness.    In ED- he received Mg. Seen by cardiology. Plan ILR interrogation and TTE. and to monitor in OBS overnight. per ED- he is high risk with cardiac history and cannot be monitored in OBS and given as admission.    # Chest pain ? anginal   # BNP elevated w/o evidence of CHF on CXR or exam   no acute ECG changes, ECG: reviewed AF with slow VR RBBB no acute changes  trop neg X 2  asymptomatic  per cards- TTE in am to reassess EF  IF echo unremarkable likely DC in am for outpt PET    # CAD s/p remote PCI/CABG in setting of preserved LV function  # HTN  cont outpt norvasc/ ACEI  lower atenolol 25 mg day as HR on Tele ranging from 30s to 50s variably.   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    ICDs  TTE  EP eval and interrogation  D/C home in AM    Total time spent in this encounter assessing, diagnosing, analyzing and medical decision making is>55 mins.

## 2025-06-11 NOTE — ED ADULT NURSE NOTE - OBJECTIVE STATEMENT
Arrives to ED c/o chest pain since yesterday. "I have hx of x2 CABG". PT slow afib with pauses. Respirations even and unlabored on room air. A&Ox4. Pt states he is no longer on blood thinners, states he took x4 asprin prior to arrival.

## 2025-06-11 NOTE — ED ADULT TRIAGE NOTE - RESPIRATORY RATE (BREATHS/MIN)
Pt has c/o left shoulder pain after CT face d/t laying in an uncomfortable position. Writer provided warm blankets to area. Writer notified attending MD and requested a lidocaine patch. Order received, NOC RN aware with ongoing monitoring.    18

## 2025-06-11 NOTE — CONSULT NOTE ADULT - SUBJECTIVE AND OBJECTIVE BOX
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 visualization was enhanced with intravenous echo contrast. The left ventricular internal cavity size is normal. Left ventricular wall thickness is normal.  Global LV systolic function was normal. Left ventricular ejection fraction, by visual estimation, is 60 to 65%.  Left Atrium: Severely enlarged left atrium.  Right Atrium: The right atrium is normal in size.  Pericardium: There is no evidence of pericardial effusion. There is a significant pericardial fat pad present.  Mitral Valve: Mild thickening and calcification of the anterior and posterior mitral valve leaflets. Mitral leaflet mobility is normal. There is mild to moderate mitral annular calcification. Mild mitral valve regurgitation is seen.  Tricuspid Valve: The tricuspid valve is normal in structure. Trivial tricuspid regurgitation is visualized.  Aortic Valve: The aortic valve is trileaflet. Peak transaortic gradient equals 12.5 mmHg, mean transaortic gradient equals 5.0 mmHg, the calculated aortic valve area equals 1.16 cm² by the continuity equation consistent with moderate aortic stenosis. Mild aortic valve regurgitation is seen.  Pulmonic Valve: The pulmonic valve was not well visualized. The pulmonic valve is normal. Trace pulmonic valve regurgitation.  Aorta: The aortic root is normal in size and structure.  Pulmonary Artery: The main pulmonary artery is normal in size.  Venous: The inferior vena cava was dilated, with respiratory size variation greater than 50%.  In comparison to the previous echocardiogram(s): Prior examinations are available and were reviewed for comparison purposes.      Summary:   1. Technically difficult study.   2. Endocardial visualization was enhanced with intravenous echo contrast.   3. Normal left ventricular internal cavity size.   4. Normal global left ventricular systolic function.   5. Left ventricular ejection fraction, by visual estimation, is 60 to 65%.   6. Severely enlarged left atrium.   7. The right atrium is normal in size.   8. 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   9. Mild aortic regurgitation.  10. Mild thickening and calcification of the anterior and posterior mitral valve leaflets.  11. Mild to moderate mitral annular calcification.  12. Mild mitral valve regurgitation.    MD Lori Electronically signed on 12/3/2021 at 2:48:57 PM    < end of copied text >    TROP neg X 2

## 2025-06-11 NOTE — H&P ADULT - HISTORY OF PRESENT ILLNESS
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  Pt stated he had expereinced CP at rest intermittently all day yest then recurred at rest this am. No radiation, and no particular aggravating, inciting or relieving factors, cannot say what brings it on. It is precordial and intermittent. Denies palpitations, n/v, diaphoresis, light headedness.    In ED- he received Mg. Seen by cardiology. Plan ILR interrogation and TTE.     Social- uses walker at baseline, denies habits  Family- no relevant med history he recalls

## 2025-06-11 NOTE — ED PROVIDER NOTE - CLINICAL SUMMARY MEDICAL DECISION MAKING FREE TEXT BOX
Patient has extensive CAD with CABG and multiple stents and now has bradycardia but never had a pacemaker had a loop recorder which was removed.  Patient is presenting with A-fib at the rate of 56 and on telemetry monitoring he fluctuates from rate of 39-56.  Plan to check labs rule out electrolyte imbalance ACS and will call cardiology consult

## 2025-06-11 NOTE — GOALS OF CARE CONVERSATION - ADVANCED CARE PLANNING - CONVERSATION DETAILS
pt states he has a living will that states DNR/ DNI.  He will ask his family to get a copy for our records

## 2025-06-12 ENCOUNTER — TRANSCRIPTION ENCOUNTER (OUTPATIENT)
Age: 85
End: 2025-06-12

## 2025-06-12 VITALS
HEART RATE: 50 BPM | DIASTOLIC BLOOD PRESSURE: 64 MMHG | TEMPERATURE: 98 F | OXYGEN SATURATION: 95 % | RESPIRATION RATE: 18 BRPM | SYSTOLIC BLOOD PRESSURE: 161 MMHG

## 2025-06-12 LAB
A1C WITH ESTIMATED AVERAGE GLUCOSE RESULT: 7.3 % — HIGH (ref 4–5.6)
ESTIMATED AVERAGE GLUCOSE: 163 MG/DL — HIGH (ref 68–114)
GLUCOSE BLDC GLUCOMTR-MCNC: 162 MG/DL — HIGH (ref 70–99)
MAGNESIUM SERPL-MCNC: 1.9 MG/DL — SIGNIFICANT CHANGE UP (ref 1.6–2.6)

## 2025-06-12 PROCEDURE — 83880 ASSAY OF NATRIURETIC PEPTIDE: CPT

## 2025-06-12 PROCEDURE — 99285 EMERGENCY DEPT VISIT HI MDM: CPT

## 2025-06-12 PROCEDURE — 96374 THER/PROPH/DIAG INJ IV PUSH: CPT

## 2025-06-12 PROCEDURE — 93005 ELECTROCARDIOGRAM TRACING: CPT

## 2025-06-12 PROCEDURE — 85730 THROMBOPLASTIN TIME PARTIAL: CPT

## 2025-06-12 PROCEDURE — 84484 ASSAY OF TROPONIN QUANT: CPT

## 2025-06-12 PROCEDURE — 36415 COLL VENOUS BLD VENIPUNCTURE: CPT

## 2025-06-12 PROCEDURE — 82962 GLUCOSE BLOOD TEST: CPT

## 2025-06-12 PROCEDURE — 99239 HOSP IP/OBS DSCHRG MGMT >30: CPT

## 2025-06-12 PROCEDURE — 84443 ASSAY THYROID STIM HORMONE: CPT

## 2025-06-12 PROCEDURE — 85025 COMPLETE CBC W/AUTO DIFF WBC: CPT

## 2025-06-12 PROCEDURE — 85610 PROTHROMBIN TIME: CPT

## 2025-06-12 PROCEDURE — 71045 X-RAY EXAM CHEST 1 VIEW: CPT

## 2025-06-12 PROCEDURE — 83036 HEMOGLOBIN GLYCOSYLATED A1C: CPT

## 2025-06-12 PROCEDURE — C8929: CPT

## 2025-06-12 PROCEDURE — 80053 COMPREHEN METABOLIC PANEL: CPT

## 2025-06-12 PROCEDURE — 83735 ASSAY OF MAGNESIUM: CPT

## 2025-06-12 PROCEDURE — 82550 ASSAY OF CK (CPK): CPT

## 2025-06-12 RX ADMIN — Medication 40 MILLIGRAM(S): at 05:31

## 2025-06-12 RX ADMIN — GABAPENTIN 300 MILLIGRAM(S): 400 CAPSULE ORAL at 05:31

## 2025-06-12 RX ADMIN — Medication 81 MILLIGRAM(S): at 08:46

## 2025-06-12 RX ADMIN — GABAPENTIN 300 MILLIGRAM(S): 400 CAPSULE ORAL at 13:00

## 2025-06-12 RX ADMIN — AMLODIPINE BESYLATE 5 MILLIGRAM(S): 10 TABLET ORAL at 05:31

## 2025-06-12 RX ADMIN — INSULIN LISPRO 1: 100 INJECTION, SOLUTION INTRAVENOUS; SUBCUTANEOUS at 11:34

## 2025-06-12 RX ADMIN — DULOXETINE 20 MILLIGRAM(S): 20 CAPSULE, DELAYED RELEASE ORAL at 08:45

## 2025-06-12 RX ADMIN — LISINOPRIL 25 MILLIGRAM(S): 30 TABLET ORAL at 05:31

## 2025-06-12 RX ADMIN — LISINOPRIL 20 MILLIGRAM(S): 5 TABLET ORAL at 05:31

## 2025-06-12 NOTE — DISCHARGE NOTE NURSING/CASE MANAGEMENT/SOCIAL WORK - PATIENT PORTAL LINK FT
You can access the FollowMyHealth Patient Portal offered by [facility] by registering at the following website: [email or web address]. By joining [facility] FollowMyHealth portal, you will also be able to view your health information using other applications (apps) compatible with our system.

## 2025-06-12 NOTE — DISCHARGE NOTE PROVIDER - NSDCCPCAREPLAN_GEN_ALL_CORE_FT
PRINCIPAL DISCHARGE DIAGNOSIS  Diagnosis: Symptomatic bradycardia  Assessment and Plan of Treatment: Please take all meds as directed, follow up with cardiology outpatient within 2 weeks of discharge      SECONDARY DISCHARGE DIAGNOSES  Diagnosis: Resting chest pain  Assessment and Plan of Treatment: plan as above

## 2025-06-12 NOTE — DISCHARGE NOTE PROVIDER - CARE PROVIDER_API CALL
Afua Singh  Cardiovascular Disease  [address] [place] [id number]  Phone: [phone]  Fax: [phone]  Follow Up Time: 1 week

## 2025-06-12 NOTE — PROGRESS NOTE ADULT - SUBJECTIVE AND OBJECTIVE BOX
[facility] - [facility], THE [facility]ART [facility]                                   [address] [place] [address]                                                      PHONE: [phone]                                                         FAX: [phone]  [email or web address]  ---------------------------------------------------------------------------------------------------------------------------------    Overnight events/patient complaints: Patient without any cardiac symptoms this AM.      No Known Allergies    MEDICATIONS  (STANDING):  amLODIPine   Tablet 5 milliGRAM(s) Oral daily  aspirin enteric coated 81 milliGRAM(s) Oral daily  atorvastatin 40 milliGRAM(s) Oral at bedtime  dextrose 5%. 1000 milliLiter(s) (50 mL/Hr) IV Continuous <Continuous>  dextrose 5%. 1000 milliLiter(s) (100 mL/Hr) IV Continuous <Continuous>  dextrose 50% Injectable 25 Gram(s) IV Push once  dextrose 50% Injectable 12.5 Gram(s) IV Push once  dextrose 50% Injectable 25 Gram(s) IV Push once  DULoxetine 20 milliGRAM(s) Oral daily  gabapentin 300 milliGRAM(s) Oral three times a day  glucagon  Injectable 1 milliGRAM(s) IntraMuscular once  insulin lispro (ADMELOG) corrective regimen sliding scale   SubCutaneous three times a day before meals  lisinopril 20 milliGRAM(s) Oral daily  pantoprazole    Tablet 40 milliGRAM(s) Oral before breakfast  tamsulosin 0.4 milliGRAM(s) Oral at bedtime    MEDICATIONS  (PRN):  dextrose Oral Gel 15 Gram(s) Oral once PRN Blood Glucose LESS THAN 70 milliGRAM(s)/deciliter      Vital Signs Last 24 Hrs  T(C): 36.3 (12 Jun 2025 07:50), Max: 36.4 (11 Jun 2025 20:56)  T(F): 97.3 (12 Jun 2025 07:50), Max: 97.6 (11 Jun 2025 20:56)  HR: 51 (12 Jun 2025 07:50) (48 - 57)  BP: 157/69 (12 Jun 2025 07:50) (153/83 - 178/78)  BP(mean): --  RR: 18 (12 Jun 2025 07:50) (18 - 19)  SpO2: 97% (12 Jun 2025 07:50) (94% - 98%)    Parameters below as of 12 Jun 2025 07:50  Patient On (Oxygen Delivery Method): room air      ICU Vital Signs Last 24 Hrs  GREGORY BONILLA  I&O's Detail    11 Jun 2025 07:01  -  12 Jun 2025 07:00  --------------------------------------------------------  IN:  Total IN: 0 mL    OUT:    Voided (mL): 1500 mL  Total OUT: 1500 mL    Total NET: -1500 mL      12 Jun 2025 07:01  -  12 Jun 2025 11:24  --------------------------------------------------------  IN:  Total IN: 0 mL    OUT:    Voided (mL): 450 mL  Total OUT: 450 mL    Total NET: -450 mL        Drug Dosing Weight  GREGORY BONILLA      PHYSICAL EXAM:  General: NAD  HEENT: Head; normocephalic, atraumatic.  Eyes: EOMI, conjunctiva normal  Neck: Supple, no JVD noted  CARDIOVASCULAR: Irregularly irregular rhythm, mildly bradycardic, S1 S2, No murmurs or gallops  LUNGS: Clear to auscultation b/l, No rales, rhonchi or wheeze, normal inspiratory effort  ABDOMEN: Soft, nontender, non-distended, +bowel sounds  EXTREMITIES: No edema b/l, no cyanosis   SKIN: warm and dry  NEURO: Alert/oriented x 3  PSYCH: normal affect.        LABS:                        11.6   5.72  )-----------( 120      ( 11 Jun 2025 10:27 )             35.7     06-11    138  |  99  |  19.5  ----------------------------<  119[H]  4.9   |  24.0  |  0.62    Ca    8.3[L]      11 Jun 2025 10:27  Mg     1.9     06-12    TPro  6.4[L]  /  Alb  3.6  /  TBili  0.3[L]  /  DBili  x   /  AST  30  /  ALT  20  /  AlkPhos  55  06-11    GREGORY BONILLA      PT/INR - ( 11 Jun 2025 10:27 )   PT: 12.3 sec;   INR: 1.06 ratio         PTT - ( 11 Jun 2025 10:27 )  PTT:33.7 sec  Urinalysis Basic - ( 11 Jun 2025 10:27 )    Color: x / Appearance: x / SG: x / pH: x  Gluc: 119 mg/dL / Ketone: x  / Bili: x / Urobili: x   Blood: x / Protein: x / Nitrite: x   Leuk Esterase: x / RBC: x / WBC x   Sq Epi: x / Non Sq Epi: x / Bacteria: x        ASSESSMENT AND PLAN:  84 year old male with a PMHx of HTN, HLD, CAD s/p remote PCI subsequent CABG X 3 in setting of normal LV function, PAF on chronic AC, [facility] canceled 2024 due to presence of SNEHAL on CTA heart (followed by Dr Clint Freitas as outpt) came to ER c/o chest pain.    Chest Pain  -now resolved  -troponin unremarkable    Afib with slow ventricular response  -HR now in the 50's  -atenolol discontinued    Thank you for letting [facility] to assist in the management of this patient. Please call with any questions.

## 2025-06-12 NOTE — DISCHARGE NOTE NURSING/CASE MANAGEMENT/SOCIAL WORK - FINANCIAL ASSISTANCE
[facility] provides services at a reduced cost to those who are determined to be eligible through [facility]’s financial assistance program. Information regarding [facility]’s financial assistance program can be found by going to [email or web address] or by calling [phone].

## 2025-06-12 NOTE — DISCHARGE NOTE PROVIDER - HOSPITAL COURSE
85 y/o M here for chest pain and bradycardia, BB DC'd, cardiology assessment, underwent TTE, ILR interrogated, patient deemed fit for DC home w/ oupatient follow up in 2 weeks.

## 2025-06-12 NOTE — DISCHARGE NOTE PROVIDER - ATTENDING DISCHARGE PHYSICAL EXAMINATION:
General: Appears alert and cooperative.  HEENT: Head; normocephalic, atraumatic.  Eyes: Pupils reactive, cornea wnl.  Neck: Supple, no nodes adenopathy, no NVD or carotid bruit or thyromegaly.  CARDIOVASCULAR: Normal S1 and S2, No murmur, rub, gallop or lift.   LUNGS: No rales, rhonchi or wheeze. Normal breath sounds bilaterally.  ABDOMEN: Soft, nontender without mass or organomegaly. bowel sounds normoactive.  EXTREMITIES: No clubbing, cyanosis or edema. Distal pulses wnl.   SKIN: warm and dry with normal turgor.  NEURO: Alert/oriented x 3/normal motor exam. No pathologic reflexes.    PSYCH: normal affect.

## 2025-06-12 NOTE — DISCHARGE NOTE PROVIDER - NSDCMRMEDTOKEN_GEN_ALL_CORE_FT
amLODIPine 5 mg oral tablet: 1 tab(s) orally once a day  aspirin 81 mg oral tablet: 1 tab(s) orally once a day  atorvastatin 80 mg oral tablet: 1 tab(s) orally once a day (at bedtime)  DULoxetine 20 mg oral delayed release capsule: 1 cap(s) orally once a day  ergocalciferol 1.25 mg (50,000 intl units) oral tablet: orally every 7 days  lisinopril 20 mg oral tablet: 1 tab(s) orally once a day  metFORMIN 1000 mg oral tablet: 0.5 tab(s) orally 2 times a day  Neurontin 300 mg oral capsule: 1 cap(s) orally 3 times a day  pantoprazole 40 mg oral delayed release tablet: 1 tab(s) orally once a day (before a meal)  tamsulosin 0.4 mg oral capsule: 1 cap(s) orally once a day with dinner

## 2025-07-12 NOTE — PATIENT PROFILE ADULT - HAS THE PATIENT RECEIVED THE INFLUENZA VACCINE THIS SEASON?
Patient states around 9am yesterday morning, he started to notice right sided flank pain. He states the pain has not gotten worse, but is not going away. He currently rates his pain a 5/10. He denies any difficulty urinating, pain with urination, radiation of the pain, and states nothing he does makes the pain better or worse. Patient has a history of a kidney and liver transplant in 2004.    yes...

## 2025-07-16 NOTE — DISCHARGE NOTE PROVIDER - NSDCQMERRANDS_GEN_ALL_CORE
Problem: Adult Inpatient Plan of Care  Goal: Plan of Care Review  Outcome: Progressing  Goal: Patient-Specific Goal (Individualized)  Outcome: Progressing  Goal: Absence of Hospital-Acquired Illness or Injury  Outcome: Progressing  Goal: Optimal Comfort and Wellbeing  Outcome: Progressing  Goal: Readiness for Transition of Care  Outcome: Progressing      Yes No

## (undated) DEVICE — SUT ETHILON 3-0 18" PS-1

## (undated) DEVICE — TUBING JET BIPOLAR

## (undated) DEVICE — DRAPE SPLIT SHEET 77" X 108"

## (undated) DEVICE — MIDAS REX LEGEND BALL FLUTED SM BORE 2.0MM X 10CM

## (undated) DEVICE — SUT VICRYL 2-0 18" CP-2 UNDYED (POP-OFF)

## (undated) DEVICE — SOL IRR POUR H2O 1000ML

## (undated) DEVICE — DRAPE 1/2 SHEET 40X57"

## (undated) DEVICE — GLV 7 PROTEXIS (WHITE)

## (undated) DEVICE — SUT VICRYL 0 18" CT-1 UNDYED (POP-OFF)

## (undated) DEVICE — MIDAS REX LEGEND BALL FLUTED SM BORE 5.0MM X 10CM

## (undated) DEVICE — VENODYNE/SCD SLEEVE CALF MEDIUM

## (undated) DEVICE — SPONGE PEANUT AUTO COUNT

## (undated) DEVICE — NDL SPINAL 18G X 3.5" (PINK)

## (undated) DEVICE — DRAIN JACKSON PRATT 7MM FLAT FULL W 15 FR TROCAR

## (undated) DEVICE — PACK NEURO

## (undated) DEVICE — ELCTR BOVIE TIP BLADE INSULATED 2.75" EDGE